# Patient Record
Sex: MALE | Race: WHITE | Employment: FULL TIME | URBAN - METROPOLITAN AREA
[De-identification: names, ages, dates, MRNs, and addresses within clinical notes are randomized per-mention and may not be internally consistent; named-entity substitution may affect disease eponyms.]

---

## 2017-01-01 ENCOUNTER — OFFICE VISIT (OUTPATIENT)
Dept: HEMATOLOGY/ONCOLOGY | Facility: HOSPITAL | Age: 67
End: 2017-01-01
Attending: NURSE PRACTITIONER
Payer: MEDICARE

## 2017-01-01 ENCOUNTER — TELEPHONE (OUTPATIENT)
Dept: HEMATOLOGY/ONCOLOGY | Facility: HOSPITAL | Age: 67
End: 2017-01-01

## 2017-01-01 ENCOUNTER — APPOINTMENT (OUTPATIENT)
Dept: HEMATOLOGY/ONCOLOGY | Facility: HOSPITAL | Age: 67
End: 2017-01-01
Attending: INTERNAL MEDICINE
Payer: COMMERCIAL

## 2017-01-01 ENCOUNTER — HOSPITAL ENCOUNTER (OUTPATIENT)
Dept: RADIATION ONCOLOGY | Facility: HOSPITAL | Age: 67
Discharge: HOME | End: 2017-01-01
Attending: RADIOLOGY
Payer: MEDICARE

## 2017-01-01 ENCOUNTER — HOSPITAL ENCOUNTER (EMERGENCY)
Facility: HOSPITAL | Age: 67
Discharge: HOME | End: 2017-01-01
Attending: EMERGENCY MEDICINE
Payer: MEDICARE

## 2017-01-01 ENCOUNTER — APPOINTMENT (OUTPATIENT)
Dept: GENERAL RADIOLOGY | Facility: HOSPITAL | Age: 67
DRG: 028 | End: 2017-01-01
Attending: Other
Payer: MEDICARE

## 2017-01-01 ENCOUNTER — HOSPITAL ENCOUNTER (OUTPATIENT)
Dept: NUCLEAR MEDICINE | Facility: HOSPITAL | Age: 67
Discharge: HOME | End: 2017-01-01
Attending: INTERNAL MEDICINE
Payer: COMMERCIAL

## 2017-01-01 ENCOUNTER — HOSPITAL ENCOUNTER (INPATIENT)
Facility: HOSPITAL | Age: 67
LOS: 2 days | Discharge: HOME | DRG: 542 | End: 2017-01-01
Attending: EMERGENCY MEDICINE | Admitting: HOSPITALIST
Payer: MEDICARE

## 2017-01-01 ENCOUNTER — ANESTHESIA (OUTPATIENT)
Dept: ENDOSCOPY | Facility: HOSPITAL | Age: 67
End: 2017-01-01

## 2017-01-01 ENCOUNTER — APPOINTMENT (OUTPATIENT)
Dept: CT IMAGING | Facility: HOSPITAL | Age: 67
DRG: 871 | End: 2017-01-01
Attending: Other
Payer: MEDICARE

## 2017-01-01 ENCOUNTER — HOSPITAL ENCOUNTER (OUTPATIENT)
Facility: HOSPITAL | Age: 67
Setting detail: OBSERVATION
Discharge: HOME | End: 2017-01-01
Attending: EMERGENCY MEDICINE | Admitting: INTERNAL MEDICINE
Payer: MEDICARE

## 2017-01-01 ENCOUNTER — APPOINTMENT (OUTPATIENT)
Dept: GENERAL RADIOLOGY | Facility: HOSPITAL | Age: 67
DRG: 028 | End: 2017-01-01
Attending: NEUROLOGICAL SURGERY
Payer: MEDICARE

## 2017-01-01 ENCOUNTER — TELEPHONE (OUTPATIENT)
Dept: FAMILY MEDICINE CLINIC | Facility: CLINIC | Age: 67
End: 2017-01-01

## 2017-01-01 ENCOUNTER — APPOINTMENT (OUTPATIENT)
Dept: CT IMAGING | Facility: HOSPITAL | Age: 67
End: 2017-01-01
Attending: EMERGENCY MEDICINE
Payer: MEDICARE

## 2017-01-01 ENCOUNTER — ANESTHESIA EVENT (OUTPATIENT)
Dept: SURGERY | Facility: HOSPITAL | Age: 67
End: 2017-01-01

## 2017-01-01 ENCOUNTER — HOSPITAL ENCOUNTER (INPATIENT)
Dept: GENERAL RADIOLOGY | Facility: HOSPITAL | Age: 67
LOS: 8 days | Discharge: HOME HEALTH | DRG: 871 | End: 2017-01-01
Attending: HOSPITALIST | Admitting: HOSPITALIST
Payer: MEDICARE

## 2017-01-01 ENCOUNTER — OFFICE VISIT (OUTPATIENT)
Dept: HEMATOLOGY/ONCOLOGY | Facility: HOSPITAL | Age: 67
End: 2017-01-01
Attending: INTERNAL MEDICINE
Payer: MEDICARE

## 2017-01-01 ENCOUNTER — HOSPITAL ENCOUNTER (OUTPATIENT)
Dept: RADIATION ONCOLOGY | Facility: HOSPITAL | Age: 67
End: 2017-01-01
Attending: RADIOLOGY
Payer: MEDICARE

## 2017-01-01 ENCOUNTER — DOCUMENTATION ONLY (OUTPATIENT)
Dept: RADIATION ONCOLOGY | Facility: HOSPITAL | Age: 67
End: 2017-01-01

## 2017-01-01 ENCOUNTER — APPOINTMENT (OUTPATIENT)
Dept: GENERAL RADIOLOGY | Facility: HOSPITAL | Age: 67
DRG: 028 | End: 2017-01-01
Attending: SPECIALIST
Payer: MEDICARE

## 2017-01-01 ENCOUNTER — APPOINTMENT (OUTPATIENT)
Dept: ULTRASOUND IMAGING | Facility: HOSPITAL | Age: 67
DRG: 871 | End: 2017-01-01
Attending: INTERNAL MEDICINE
Payer: MEDICARE

## 2017-01-01 ENCOUNTER — APPOINTMENT (OUTPATIENT)
Dept: MRI IMAGING | Facility: HOSPITAL | Age: 67
DRG: 028 | End: 2017-01-01
Attending: Other
Payer: MEDICARE

## 2017-01-01 ENCOUNTER — APPOINTMENT (OUTPATIENT)
Dept: CT IMAGING | Facility: HOSPITAL | Age: 67
DRG: 028 | End: 2017-01-01
Attending: NURSE PRACTITIONER
Payer: MEDICARE

## 2017-01-01 ENCOUNTER — SOCIAL WORK SERVICES (OUTPATIENT)
Dept: HEMATOLOGY/ONCOLOGY | Facility: HOSPITAL | Age: 67
End: 2017-01-01

## 2017-01-01 ENCOUNTER — HOSPITAL ENCOUNTER (INPATIENT)
Dept: RADIATION ONCOLOGY | Facility: HOSPITAL | Age: 67
Discharge: HOME | DRG: 542 | End: 2017-01-01
Attending: RADIOLOGY
Payer: MEDICARE

## 2017-01-01 ENCOUNTER — SURGERY (OUTPATIENT)
Age: 67
End: 2017-01-01

## 2017-01-01 ENCOUNTER — HOSPITAL ENCOUNTER (OUTPATIENT)
Dept: INTERVENTIONAL RADIOLOGY/VASCULAR | Facility: HOSPITAL | Age: 67
Discharge: HOME | End: 2017-01-01
Attending: INTERNAL MEDICINE | Admitting: INTERNAL MEDICINE
Payer: COMMERCIAL

## 2017-01-01 ENCOUNTER — APPOINTMENT (OUTPATIENT)
Dept: ULTRASOUND IMAGING | Facility: HOSPITAL | Age: 67
End: 2017-01-01
Attending: EMERGENCY MEDICINE
Payer: MEDICARE

## 2017-01-01 ENCOUNTER — HOSPITAL ENCOUNTER (OUTPATIENT)
Dept: MRI IMAGING | Facility: HOSPITAL | Age: 67
Discharge: HOME | End: 2017-01-01
Attending: INTERNAL MEDICINE
Payer: COMMERCIAL

## 2017-01-01 ENCOUNTER — HOSPITAL ENCOUNTER (INPATIENT)
Facility: HOSPITAL | Age: 67
LOS: 8 days | Discharge: HOSPICE | DRG: 028 | End: 2017-01-01
Attending: EMERGENCY MEDICINE | Admitting: HOSPITALIST
Payer: MEDICARE

## 2017-01-01 ENCOUNTER — APPOINTMENT (OUTPATIENT)
Dept: LAB | Facility: HOSPITAL | Age: 67
End: 2017-01-01
Attending: INTERNAL MEDICINE
Payer: COMMERCIAL

## 2017-01-01 ENCOUNTER — APPOINTMENT (OUTPATIENT)
Dept: GENERAL RADIOLOGY | Facility: HOSPITAL | Age: 67
DRG: 871 | End: 2017-01-01
Attending: HOSPITALIST
Payer: MEDICARE

## 2017-01-01 ENCOUNTER — TELEPHONE (OUTPATIENT)
Dept: SURGERY | Facility: CLINIC | Age: 67
End: 2017-01-01

## 2017-01-01 ENCOUNTER — LAB REQUISITION (OUTPATIENT)
Dept: LAB | Facility: HOSPITAL | Age: 67
DRG: 682 | End: 2017-01-01
Attending: FAMILY MEDICINE
Payer: MEDICARE

## 2017-01-01 ENCOUNTER — APPOINTMENT (OUTPATIENT)
Dept: CT IMAGING | Facility: HOSPITAL | Age: 67
DRG: 392 | End: 2017-01-01
Attending: EMERGENCY MEDICINE
Payer: MEDICARE

## 2017-01-01 ENCOUNTER — HOSPITAL ENCOUNTER (INPATIENT)
Facility: HOSPITAL | Age: 67
LOS: 1 days | Discharge: HOME | DRG: 683 | End: 2017-01-01
Attending: EMERGENCY MEDICINE | Admitting: INTERNAL MEDICINE
Payer: MEDICARE

## 2017-01-01 ENCOUNTER — APPOINTMENT (OUTPATIENT)
Dept: GENERAL RADIOLOGY | Facility: HOSPITAL | Age: 67
DRG: 542 | End: 2017-01-01
Attending: EMERGENCY MEDICINE
Payer: MEDICARE

## 2017-01-01 ENCOUNTER — OFFICE VISIT (OUTPATIENT)
Dept: FAMILY MEDICINE CLINIC | Facility: CLINIC | Age: 67
End: 2017-01-01

## 2017-01-01 ENCOUNTER — ANESTHESIA EVENT (OUTPATIENT)
Dept: MRI IMAGING | Facility: HOSPITAL | Age: 67
End: 2017-01-01

## 2017-01-01 ENCOUNTER — HOSPITAL ENCOUNTER (OUTPATIENT)
Dept: NUCLEAR MEDICINE | Facility: HOSPITAL | Age: 67
Discharge: HOME | End: 2017-01-01
Attending: INTERNAL MEDICINE
Payer: MEDICARE

## 2017-01-01 ENCOUNTER — HOSPITAL ENCOUNTER (OUTPATIENT)
Dept: NUCLEAR MEDICINE | Facility: HOSPITAL | Age: 67
Discharge: HOME | DRG: 543 | End: 2017-01-01
Attending: INTERNAL MEDICINE
Payer: MEDICARE

## 2017-01-01 ENCOUNTER — APPOINTMENT (OUTPATIENT)
Dept: GENERAL RADIOLOGY | Facility: HOSPITAL | Age: 67
DRG: 543 | End: 2017-01-01
Attending: EMERGENCY MEDICINE
Payer: MEDICARE

## 2017-01-01 ENCOUNTER — APPOINTMENT (OUTPATIENT)
Dept: CT IMAGING | Facility: HOSPITAL | Age: 67
DRG: 543 | End: 2017-01-01
Attending: EMERGENCY MEDICINE
Payer: MEDICARE

## 2017-01-01 ENCOUNTER — APPOINTMENT (OUTPATIENT)
Dept: GENERAL RADIOLOGY | Facility: HOSPITAL | Age: 67
DRG: 028 | End: 2017-01-01
Attending: EMERGENCY MEDICINE
Payer: MEDICARE

## 2017-01-01 ENCOUNTER — APPOINTMENT (OUTPATIENT)
Dept: GENERAL RADIOLOGY | Facility: HOSPITAL | Age: 67
End: 2017-01-01
Attending: EMERGENCY MEDICINE
Payer: MEDICARE

## 2017-01-01 ENCOUNTER — MEDICAL CORRESPONDENCE (OUTPATIENT)
Dept: RADIATION ONCOLOGY | Facility: HOSPITAL | Age: 67
End: 2017-01-01

## 2017-01-01 ENCOUNTER — HOSPITAL ENCOUNTER (OUTPATIENT)
Dept: CV DIAGNOSTICS | Facility: HOSPITAL | Age: 67
Discharge: HOME | DRG: 682 | End: 2017-01-01
Attending: INTERNAL MEDICINE
Payer: MEDICARE

## 2017-01-01 ENCOUNTER — APPOINTMENT (OUTPATIENT)
Dept: MRI IMAGING | Facility: HOSPITAL | Age: 67
DRG: 543 | End: 2017-01-01
Attending: PHYSICIAN ASSISTANT
Payer: MEDICARE

## 2017-01-01 ENCOUNTER — APPOINTMENT (OUTPATIENT)
Dept: CV DIAGNOSTICS | Facility: HOSPITAL | Age: 67
DRG: 543 | End: 2017-01-01
Attending: INTERNAL MEDICINE
Payer: MEDICARE

## 2017-01-01 ENCOUNTER — HOSPITAL ENCOUNTER (OUTPATIENT)
Dept: CT IMAGING | Facility: HOSPITAL | Age: 67
Discharge: HOME | End: 2017-01-01
Payer: MEDICARE

## 2017-01-01 ENCOUNTER — TELEPHONE (OUTPATIENT)
Dept: RADIATION ONCOLOGY | Facility: HOSPITAL | Age: 67
End: 2017-01-01

## 2017-01-01 ENCOUNTER — EKG ENCOUNTER (OUTPATIENT)
Dept: LAB | Facility: HOSPITAL | Age: 67
End: 2017-01-01
Attending: INTERNAL MEDICINE
Payer: MEDICARE

## 2017-01-01 ENCOUNTER — HOSPITAL ENCOUNTER (OUTPATIENT)
Dept: GENERAL RADIOLOGY | Facility: HOSPITAL | Age: 67
Discharge: HOME | End: 2017-01-01
Attending: INTERNAL MEDICINE
Payer: MEDICARE

## 2017-01-01 ENCOUNTER — HOSPITAL ENCOUNTER (OUTPATIENT)
Facility: HOSPITAL | Age: 67
Setting detail: OBSERVATION
Discharge: HOME | End: 2017-01-01
Attending: EMERGENCY MEDICINE | Admitting: HOSPITALIST
Payer: MEDICARE

## 2017-01-01 ENCOUNTER — ANESTHESIA EVENT (OUTPATIENT)
Dept: ENDOSCOPY | Facility: HOSPITAL | Age: 67
End: 2017-01-01

## 2017-01-01 ENCOUNTER — APPOINTMENT (OUTPATIENT)
Dept: GENERAL RADIOLOGY | Facility: HOSPITAL | Age: 67
DRG: 028 | End: 2017-01-01
Attending: INTERNAL MEDICINE
Payer: MEDICARE

## 2017-01-01 ENCOUNTER — HOSPITAL ENCOUNTER (INPATIENT)
Facility: HOSPITAL | Age: 67
LOS: 3 days | Discharge: HOME | DRG: 439 | End: 2017-01-01
Attending: EMERGENCY MEDICINE | Admitting: INTERNAL MEDICINE
Payer: COMMERCIAL

## 2017-01-01 ENCOUNTER — APPOINTMENT (OUTPATIENT)
Dept: GENERAL RADIOLOGY | Facility: HOSPITAL | Age: 67
DRG: 028 | End: 2017-01-01
Attending: NURSE PRACTITIONER
Payer: MEDICARE

## 2017-01-01 ENCOUNTER — ANESTHESIA (OUTPATIENT)
Dept: MRI IMAGING | Facility: HOSPITAL | Age: 67
End: 2017-01-01

## 2017-01-01 ENCOUNTER — HOSPITAL ENCOUNTER (INPATIENT)
Facility: HOSPITAL | Age: 67
LOS: 1 days | Discharge: HOME | DRG: 543 | End: 2017-01-01
Attending: EMERGENCY MEDICINE | Admitting: HOSPITALIST
Payer: MEDICARE

## 2017-01-01 ENCOUNTER — ANESTHESIA (OUTPATIENT)
Dept: SURGERY | Facility: HOSPITAL | Age: 67
End: 2017-01-01

## 2017-01-01 ENCOUNTER — HOSPITAL ENCOUNTER (OUTPATIENT)
Dept: NUCLEAR MEDICINE | Facility: HOSPITAL | Age: 67
End: 2017-01-01
Attending: INTERNAL MEDICINE
Payer: COMMERCIAL

## 2017-01-01 ENCOUNTER — HOSPITAL ENCOUNTER (INPATIENT)
Facility: HOSPITAL | Age: 67
LOS: 1 days | Discharge: HOME | DRG: 392 | End: 2017-01-01
Attending: EMERGENCY MEDICINE | Admitting: HOSPITALIST
Payer: MEDICARE

## 2017-01-01 ENCOUNTER — HOSPITAL ENCOUNTER (OUTPATIENT)
Dept: GENERAL RADIOLOGY | Facility: HOSPITAL | Age: 67
Discharge: HOME | End: 2017-01-01
Attending: RADIOLOGY
Payer: MEDICARE

## 2017-01-01 ENCOUNTER — APPOINTMENT (OUTPATIENT)
Dept: CT IMAGING | Facility: HOSPITAL | Age: 67
DRG: 028 | End: 2017-01-01
Attending: Other
Payer: MEDICARE

## 2017-01-01 ENCOUNTER — APPOINTMENT (OUTPATIENT)
Dept: CT IMAGING | Facility: HOSPITAL | Age: 67
DRG: 028 | End: 2017-01-01
Attending: EMERGENCY MEDICINE
Payer: MEDICARE

## 2017-01-01 VITALS
RESPIRATION RATE: 20 BRPM | DIASTOLIC BLOOD PRESSURE: 78 MMHG | HEIGHT: 67.48 IN | BODY MASS INDEX: 23.7 KG/M2 | HEART RATE: 71 BPM | WEIGHT: 152.81 LBS | TEMPERATURE: 98 F | SYSTOLIC BLOOD PRESSURE: 139 MMHG | OXYGEN SATURATION: 98 %

## 2017-01-01 VITALS
HEART RATE: 63 BPM | SYSTOLIC BLOOD PRESSURE: 116 MMHG | RESPIRATION RATE: 20 BRPM | TEMPERATURE: 98 F | OXYGEN SATURATION: 99 % | BODY MASS INDEX: 24 KG/M2 | DIASTOLIC BLOOD PRESSURE: 57 MMHG | WEIGHT: 156.5 LBS

## 2017-01-01 VITALS
BODY MASS INDEX: 22.5 KG/M2 | OXYGEN SATURATION: 98 % | SYSTOLIC BLOOD PRESSURE: 132 MMHG | DIASTOLIC BLOOD PRESSURE: 67 MMHG | HEART RATE: 70 BPM | HEIGHT: 66.14 IN | WEIGHT: 140 LBS | RESPIRATION RATE: 18 BRPM | TEMPERATURE: 98 F

## 2017-01-01 VITALS
RESPIRATION RATE: 18 BRPM | HEART RATE: 90 BPM | DIASTOLIC BLOOD PRESSURE: 61 MMHG | TEMPERATURE: 97 F | SYSTOLIC BLOOD PRESSURE: 106 MMHG

## 2017-01-01 VITALS
DIASTOLIC BLOOD PRESSURE: 81 MMHG | HEIGHT: 66.14 IN | HEIGHT: 66.14 IN | OXYGEN SATURATION: 93 % | BODY MASS INDEX: 22.02 KG/M2 | WEIGHT: 137 LBS | WEIGHT: 151.81 LBS | HEART RATE: 76 BPM | SYSTOLIC BLOOD PRESSURE: 116 MMHG | TEMPERATURE: 97 F | HEART RATE: 92 BPM | DIASTOLIC BLOOD PRESSURE: 52 MMHG | RESPIRATION RATE: 20 BRPM | BODY MASS INDEX: 24.4 KG/M2 | SYSTOLIC BLOOD PRESSURE: 111 MMHG | TEMPERATURE: 97 F

## 2017-01-01 VITALS
RESPIRATION RATE: 18 BRPM | DIASTOLIC BLOOD PRESSURE: 61 MMHG | HEIGHT: 66.14 IN | SYSTOLIC BLOOD PRESSURE: 120 MMHG | BODY MASS INDEX: 22.31 KG/M2 | HEART RATE: 84 BPM | TEMPERATURE: 97 F | WEIGHT: 138.81 LBS

## 2017-01-01 VITALS
WEIGHT: 146 LBS | BODY MASS INDEX: 24 KG/M2 | RESPIRATION RATE: 18 BRPM | SYSTOLIC BLOOD PRESSURE: 140 MMHG | TEMPERATURE: 97 F | HEART RATE: 71 BPM | DIASTOLIC BLOOD PRESSURE: 80 MMHG

## 2017-01-01 VITALS
SYSTOLIC BLOOD PRESSURE: 138 MMHG | HEIGHT: 66.14 IN | RESPIRATION RATE: 18 BRPM | BODY MASS INDEX: 24.46 KG/M2 | TEMPERATURE: 97 F | HEART RATE: 80 BPM | DIASTOLIC BLOOD PRESSURE: 72 MMHG | OXYGEN SATURATION: 99 % | WEIGHT: 152.19 LBS

## 2017-01-01 VITALS
WEIGHT: 121.25 LBS | DIASTOLIC BLOOD PRESSURE: 74 MMHG | RESPIRATION RATE: 17 BRPM | TEMPERATURE: 99 F | HEIGHT: 66 IN | SYSTOLIC BLOOD PRESSURE: 146 MMHG | OXYGEN SATURATION: 98 % | HEART RATE: 97 BPM | BODY MASS INDEX: 19.49 KG/M2

## 2017-01-01 VITALS
DIASTOLIC BLOOD PRESSURE: 62 MMHG | TEMPERATURE: 98 F | HEIGHT: 66 IN | BODY MASS INDEX: 20.25 KG/M2 | WEIGHT: 126 LBS | SYSTOLIC BLOOD PRESSURE: 110 MMHG | RESPIRATION RATE: 16 BRPM | HEART RATE: 72 BPM

## 2017-01-01 VITALS
SYSTOLIC BLOOD PRESSURE: 151 MMHG | BODY MASS INDEX: 23.48 KG/M2 | HEIGHT: 67.48 IN | WEIGHT: 151.38 LBS | TEMPERATURE: 97 F | OXYGEN SATURATION: 98 % | HEART RATE: 83 BPM | DIASTOLIC BLOOD PRESSURE: 83 MMHG | RESPIRATION RATE: 18 BRPM

## 2017-01-01 VITALS
HEIGHT: 66 IN | SYSTOLIC BLOOD PRESSURE: 130 MMHG | BODY MASS INDEX: 19.93 KG/M2 | RESPIRATION RATE: 16 BRPM | WEIGHT: 124 LBS | HEART RATE: 74 BPM | OXYGEN SATURATION: 98 % | DIASTOLIC BLOOD PRESSURE: 78 MMHG | TEMPERATURE: 98 F

## 2017-01-01 VITALS
RESPIRATION RATE: 20 BRPM | TEMPERATURE: 98 F | TEMPERATURE: 96 F | HEIGHT: 66.14 IN | WEIGHT: 152 LBS | BODY MASS INDEX: 24.56 KG/M2 | WEIGHT: 152.81 LBS | RESPIRATION RATE: 18 BRPM | DIASTOLIC BLOOD PRESSURE: 78 MMHG | SYSTOLIC BLOOD PRESSURE: 147 MMHG | DIASTOLIC BLOOD PRESSURE: 77 MMHG | BODY MASS INDEX: 24 KG/M2 | OXYGEN SATURATION: 98 % | HEART RATE: 72 BPM | HEART RATE: 80 BPM | SYSTOLIC BLOOD PRESSURE: 143 MMHG

## 2017-01-01 VITALS
HEIGHT: 66 IN | BODY MASS INDEX: 22.76 KG/M2 | RESPIRATION RATE: 18 BRPM | HEART RATE: 68 BPM | TEMPERATURE: 98 F | OXYGEN SATURATION: 96 % | DIASTOLIC BLOOD PRESSURE: 57 MMHG | SYSTOLIC BLOOD PRESSURE: 124 MMHG | WEIGHT: 141.63 LBS

## 2017-01-01 VITALS
BODY MASS INDEX: 24.28 KG/M2 | DIASTOLIC BLOOD PRESSURE: 61 MMHG | OXYGEN SATURATION: 99 % | SYSTOLIC BLOOD PRESSURE: 169 MMHG | TEMPERATURE: 97 F | RESPIRATION RATE: 20 BRPM | WEIGHT: 156.5 LBS | HEIGHT: 67.48 IN

## 2017-01-01 VITALS
HEIGHT: 66.14 IN | BODY MASS INDEX: 22.5 KG/M2 | WEIGHT: 140 LBS | OXYGEN SATURATION: 99 % | TEMPERATURE: 99 F | RESPIRATION RATE: 18 BRPM | DIASTOLIC BLOOD PRESSURE: 74 MMHG | SYSTOLIC BLOOD PRESSURE: 127 MMHG | HEART RATE: 88 BPM

## 2017-01-01 VITALS
RESPIRATION RATE: 18 BRPM | OXYGEN SATURATION: 97 % | TEMPERATURE: 98 F | SYSTOLIC BLOOD PRESSURE: 130 MMHG | DIASTOLIC BLOOD PRESSURE: 66 MMHG | HEIGHT: 67.48 IN | HEART RATE: 88 BPM | BODY MASS INDEX: 23.14 KG/M2 | WEIGHT: 149.19 LBS

## 2017-01-01 VITALS
OXYGEN SATURATION: 95 % | HEART RATE: 80 BPM | TEMPERATURE: 97 F | RESPIRATION RATE: 18 BRPM | DIASTOLIC BLOOD PRESSURE: 77 MMHG | HEIGHT: 66.14 IN | SYSTOLIC BLOOD PRESSURE: 151 MMHG | WEIGHT: 137.38 LBS | BODY MASS INDEX: 22.08 KG/M2

## 2017-01-01 VITALS
DIASTOLIC BLOOD PRESSURE: 80 MMHG | TEMPERATURE: 98 F | WEIGHT: 137.19 LBS | SYSTOLIC BLOOD PRESSURE: 125 MMHG | BODY MASS INDEX: 22.05 KG/M2 | HEART RATE: 107 BPM | RESPIRATION RATE: 18 BRPM | HEIGHT: 66.14 IN | OXYGEN SATURATION: 96 %

## 2017-01-01 VITALS
DIASTOLIC BLOOD PRESSURE: 78 MMHG | SYSTOLIC BLOOD PRESSURE: 131 MMHG | RESPIRATION RATE: 16 BRPM | HEART RATE: 79 BPM | TEMPERATURE: 98 F

## 2017-01-01 VITALS
WEIGHT: 150.81 LBS | SYSTOLIC BLOOD PRESSURE: 137 MMHG | TEMPERATURE: 97 F | RESPIRATION RATE: 20 BRPM | DIASTOLIC BLOOD PRESSURE: 69 MMHG | OXYGEN SATURATION: 99 % | HEART RATE: 80 BPM | BODY MASS INDEX: 24.24 KG/M2 | HEIGHT: 66.14 IN

## 2017-01-01 VITALS
BODY MASS INDEX: 23.61 KG/M2 | HEIGHT: 67.48 IN | RESPIRATION RATE: 16 BRPM | DIASTOLIC BLOOD PRESSURE: 60 MMHG | HEART RATE: 80 BPM | WEIGHT: 152.19 LBS | OXYGEN SATURATION: 96 % | TEMPERATURE: 98 F | SYSTOLIC BLOOD PRESSURE: 115 MMHG

## 2017-01-01 VITALS
DIASTOLIC BLOOD PRESSURE: 73 MMHG | SYSTOLIC BLOOD PRESSURE: 131 MMHG | OXYGEN SATURATION: 94 % | WEIGHT: 155.63 LBS | HEIGHT: 66.14 IN | RESPIRATION RATE: 18 BRPM | HEART RATE: 79 BPM | BODY MASS INDEX: 25.01 KG/M2 | TEMPERATURE: 98 F

## 2017-01-01 VITALS
SYSTOLIC BLOOD PRESSURE: 156 MMHG | HEART RATE: 69 BPM | WEIGHT: 153.81 LBS | TEMPERATURE: 97 F | BODY MASS INDEX: 23.86 KG/M2 | DIASTOLIC BLOOD PRESSURE: 89 MMHG | OXYGEN SATURATION: 98 % | HEIGHT: 67.48 IN | RESPIRATION RATE: 18 BRPM

## 2017-01-01 VITALS
BODY MASS INDEX: 23.86 KG/M2 | HEIGHT: 67 IN | RESPIRATION RATE: 14 BRPM | DIASTOLIC BLOOD PRESSURE: 57 MMHG | SYSTOLIC BLOOD PRESSURE: 114 MMHG | WEIGHT: 152 LBS | HEART RATE: 71 BPM | OXYGEN SATURATION: 94 % | TEMPERATURE: 97 F

## 2017-01-01 VITALS
RESPIRATION RATE: 15 BRPM | HEIGHT: 66 IN | BODY MASS INDEX: 24.43 KG/M2 | HEART RATE: 61 BPM | TEMPERATURE: 97 F | OXYGEN SATURATION: 98 % | WEIGHT: 152 LBS | DIASTOLIC BLOOD PRESSURE: 81 MMHG | SYSTOLIC BLOOD PRESSURE: 139 MMHG

## 2017-01-01 VITALS
OXYGEN SATURATION: 95 % | RESPIRATION RATE: 18 BRPM | HEART RATE: 75 BPM | DIASTOLIC BLOOD PRESSURE: 68 MMHG | WEIGHT: 147.69 LBS | TEMPERATURE: 98 F | BODY MASS INDEX: 24 KG/M2 | SYSTOLIC BLOOD PRESSURE: 148 MMHG

## 2017-01-01 VITALS
WEIGHT: 138.19 LBS | TEMPERATURE: 98 F | HEART RATE: 90 BPM | SYSTOLIC BLOOD PRESSURE: 113 MMHG | RESPIRATION RATE: 18 BRPM | OXYGEN SATURATION: 100 % | DIASTOLIC BLOOD PRESSURE: 71 MMHG | BODY MASS INDEX: 22.21 KG/M2 | HEIGHT: 66.14 IN

## 2017-01-01 VITALS
WEIGHT: 151.63 LBS | OXYGEN SATURATION: 96 % | SYSTOLIC BLOOD PRESSURE: 123 MMHG | RESPIRATION RATE: 18 BRPM | TEMPERATURE: 98 F | DIASTOLIC BLOOD PRESSURE: 70 MMHG | BODY MASS INDEX: 24 KG/M2 | HEART RATE: 93 BPM

## 2017-01-01 VITALS
TEMPERATURE: 98 F | OXYGEN SATURATION: 96 % | WEIGHT: 141.5 LBS | HEART RATE: 73 BPM | RESPIRATION RATE: 18 BRPM | HEIGHT: 66.14 IN | DIASTOLIC BLOOD PRESSURE: 78 MMHG | SYSTOLIC BLOOD PRESSURE: 148 MMHG | BODY MASS INDEX: 22.74 KG/M2

## 2017-01-01 VITALS
SYSTOLIC BLOOD PRESSURE: 141 MMHG | HEART RATE: 90 BPM | BODY MASS INDEX: 22 KG/M2 | WEIGHT: 137 LBS | DIASTOLIC BLOOD PRESSURE: 69 MMHG

## 2017-01-01 VITALS
OXYGEN SATURATION: 98 % | HEART RATE: 81 BPM | TEMPERATURE: 98 F | WEIGHT: 138.81 LBS | SYSTOLIC BLOOD PRESSURE: 149 MMHG | DIASTOLIC BLOOD PRESSURE: 83 MMHG | HEIGHT: 66.14 IN | RESPIRATION RATE: 18 BRPM | BODY MASS INDEX: 22.31 KG/M2

## 2017-01-01 VITALS
HEART RATE: 93 BPM | TEMPERATURE: 97 F | OXYGEN SATURATION: 96 % | RESPIRATION RATE: 18 BRPM | DIASTOLIC BLOOD PRESSURE: 90 MMHG | SYSTOLIC BLOOD PRESSURE: 162 MMHG

## 2017-01-01 VITALS
TEMPERATURE: 98 F | WEIGHT: 142 LBS | DIASTOLIC BLOOD PRESSURE: 68 MMHG | BODY MASS INDEX: 22.82 KG/M2 | HEART RATE: 73 BPM | OXYGEN SATURATION: 99 % | HEIGHT: 66.14 IN | RESPIRATION RATE: 18 BRPM | SYSTOLIC BLOOD PRESSURE: 126 MMHG

## 2017-01-01 VITALS
WEIGHT: 150.38 LBS | DIASTOLIC BLOOD PRESSURE: 68 MMHG | BODY MASS INDEX: 24.17 KG/M2 | RESPIRATION RATE: 18 BRPM | HEART RATE: 79 BPM | OXYGEN SATURATION: 95 % | TEMPERATURE: 97 F | SYSTOLIC BLOOD PRESSURE: 139 MMHG | HEIGHT: 66.14 IN

## 2017-01-01 VITALS
HEIGHT: 66.14 IN | WEIGHT: 152.19 LBS | OXYGEN SATURATION: 99 % | BODY MASS INDEX: 24.46 KG/M2 | SYSTOLIC BLOOD PRESSURE: 115 MMHG | HEART RATE: 101 BPM | TEMPERATURE: 98 F | RESPIRATION RATE: 18 BRPM | DIASTOLIC BLOOD PRESSURE: 72 MMHG

## 2017-01-01 VITALS
BODY MASS INDEX: 22.5 KG/M2 | DIASTOLIC BLOOD PRESSURE: 78 MMHG | TEMPERATURE: 98 F | HEART RATE: 80 BPM | SYSTOLIC BLOOD PRESSURE: 154 MMHG | OXYGEN SATURATION: 98 % | WEIGHT: 140 LBS | HEIGHT: 66 IN | RESPIRATION RATE: 18 BRPM

## 2017-01-01 VITALS
BODY MASS INDEX: 22.5 KG/M2 | SYSTOLIC BLOOD PRESSURE: 125 MMHG | DIASTOLIC BLOOD PRESSURE: 72 MMHG | HEIGHT: 66.14 IN | WEIGHT: 140 LBS | HEART RATE: 89 BPM | RESPIRATION RATE: 18 BRPM | TEMPERATURE: 97 F | OXYGEN SATURATION: 98 %

## 2017-01-01 VITALS
RESPIRATION RATE: 18 BRPM | OXYGEN SATURATION: 95 % | SYSTOLIC BLOOD PRESSURE: 146 MMHG | BODY MASS INDEX: 24.11 KG/M2 | TEMPERATURE: 98 F | HEIGHT: 66 IN | WEIGHT: 150 LBS | DIASTOLIC BLOOD PRESSURE: 74 MMHG | HEART RATE: 65 BPM

## 2017-01-01 VITALS
WEIGHT: 144 LBS | BODY MASS INDEX: 23.14 KG/M2 | HEART RATE: 73 BPM | DIASTOLIC BLOOD PRESSURE: 83 MMHG | HEIGHT: 66.14 IN | TEMPERATURE: 97 F | RESPIRATION RATE: 18 BRPM | SYSTOLIC BLOOD PRESSURE: 162 MMHG | OXYGEN SATURATION: 98 %

## 2017-01-01 VITALS
RESPIRATION RATE: 18 BRPM | HEART RATE: 94 BPM | SYSTOLIC BLOOD PRESSURE: 114 MMHG | BODY MASS INDEX: 22.44 KG/M2 | DIASTOLIC BLOOD PRESSURE: 46 MMHG | HEIGHT: 66.14 IN | WEIGHT: 139.63 LBS | OXYGEN SATURATION: 95 % | TEMPERATURE: 97 F

## 2017-01-01 VITALS
HEART RATE: 96 BPM | TEMPERATURE: 99 F | SYSTOLIC BLOOD PRESSURE: 156 MMHG | WEIGHT: 142 LBS | OXYGEN SATURATION: 100 % | BODY MASS INDEX: 23 KG/M2 | DIASTOLIC BLOOD PRESSURE: 84 MMHG | RESPIRATION RATE: 22 BRPM

## 2017-01-01 VITALS
HEART RATE: 79 BPM | BODY MASS INDEX: 20 KG/M2 | WEIGHT: 127.38 LBS | RESPIRATION RATE: 17 BRPM | DIASTOLIC BLOOD PRESSURE: 81 MMHG | OXYGEN SATURATION: 98 % | SYSTOLIC BLOOD PRESSURE: 145 MMHG

## 2017-01-01 VITALS
RESPIRATION RATE: 20 BRPM | OXYGEN SATURATION: 98 % | HEART RATE: 87 BPM | WEIGHT: 152.19 LBS | SYSTOLIC BLOOD PRESSURE: 169 MMHG | DIASTOLIC BLOOD PRESSURE: 87 MMHG | BODY MASS INDEX: 24.46 KG/M2 | HEIGHT: 66.14 IN | TEMPERATURE: 97 F

## 2017-01-01 VITALS
RESPIRATION RATE: 18 BRPM | HEART RATE: 52 BPM | DIASTOLIC BLOOD PRESSURE: 63 MMHG | SYSTOLIC BLOOD PRESSURE: 118 MMHG | BODY MASS INDEX: 22.42 KG/M2 | WEIGHT: 139.5 LBS | OXYGEN SATURATION: 95 % | HEIGHT: 66 IN | TEMPERATURE: 98 F

## 2017-01-01 VITALS
HEART RATE: 75 BPM | RESPIRATION RATE: 18 BRPM | HEIGHT: 67.48 IN | OXYGEN SATURATION: 98 % | WEIGHT: 153 LBS | TEMPERATURE: 97 F | DIASTOLIC BLOOD PRESSURE: 76 MMHG | SYSTOLIC BLOOD PRESSURE: 122 MMHG | BODY MASS INDEX: 23.73 KG/M2

## 2017-01-01 VITALS
OXYGEN SATURATION: 92 % | WEIGHT: 124.38 LBS | TEMPERATURE: 98 F | DIASTOLIC BLOOD PRESSURE: 73 MMHG | WEIGHT: 123 LBS | SYSTOLIC BLOOD PRESSURE: 123 MMHG | OXYGEN SATURATION: 99 % | HEART RATE: 85 BPM | DIASTOLIC BLOOD PRESSURE: 80 MMHG | HEIGHT: 66.14 IN | SYSTOLIC BLOOD PRESSURE: 121 MMHG | BODY MASS INDEX: 20 KG/M2 | BODY MASS INDEX: 19.99 KG/M2 | RESPIRATION RATE: 16 BRPM | HEART RATE: 88 BPM

## 2017-01-01 VITALS
SYSTOLIC BLOOD PRESSURE: 154 MMHG | RESPIRATION RATE: 18 BRPM | HEIGHT: 67.48 IN | TEMPERATURE: 98 F | DIASTOLIC BLOOD PRESSURE: 77 MMHG | HEART RATE: 95 BPM | BODY MASS INDEX: 24.23 KG/M2 | WEIGHT: 156.19 LBS | OXYGEN SATURATION: 96 %

## 2017-01-01 VITALS
SYSTOLIC BLOOD PRESSURE: 109 MMHG | DIASTOLIC BLOOD PRESSURE: 77 MMHG | BODY MASS INDEX: 24.21 KG/M2 | RESPIRATION RATE: 18 BRPM | WEIGHT: 150.63 LBS | HEIGHT: 66.14 IN | TEMPERATURE: 96 F | HEART RATE: 88 BPM

## 2017-01-01 VITALS
RESPIRATION RATE: 18 BRPM | WEIGHT: 141.5 LBS | TEMPERATURE: 99 F | SYSTOLIC BLOOD PRESSURE: 159 MMHG | HEART RATE: 78 BPM | DIASTOLIC BLOOD PRESSURE: 74 MMHG | OXYGEN SATURATION: 92 % | BODY MASS INDEX: 23 KG/M2

## 2017-01-01 VITALS
OXYGEN SATURATION: 98 % | TEMPERATURE: 98 F | DIASTOLIC BLOOD PRESSURE: 68 MMHG | SYSTOLIC BLOOD PRESSURE: 141 MMHG | HEART RATE: 68 BPM | HEIGHT: 66 IN | WEIGHT: 138.69 LBS | BODY MASS INDEX: 22.29 KG/M2 | RESPIRATION RATE: 20 BRPM

## 2017-01-01 VITALS
DIASTOLIC BLOOD PRESSURE: 63 MMHG | HEART RATE: 86 BPM | OXYGEN SATURATION: 93 % | TEMPERATURE: 98 F | SYSTOLIC BLOOD PRESSURE: 135 MMHG | RESPIRATION RATE: 20 BRPM

## 2017-01-01 VITALS
RESPIRATION RATE: 20 BRPM | DIASTOLIC BLOOD PRESSURE: 83 MMHG | TEMPERATURE: 98 F | SYSTOLIC BLOOD PRESSURE: 149 MMHG | HEART RATE: 92 BPM

## 2017-01-01 VITALS
WEIGHT: 141.5 LBS | HEART RATE: 78 BPM | OXYGEN SATURATION: 97 % | SYSTOLIC BLOOD PRESSURE: 129 MMHG | BODY MASS INDEX: 23 KG/M2 | DIASTOLIC BLOOD PRESSURE: 73 MMHG | RESPIRATION RATE: 20 BRPM | TEMPERATURE: 96 F

## 2017-01-01 VITALS
OXYGEN SATURATION: 95 % | TEMPERATURE: 97 F | DIASTOLIC BLOOD PRESSURE: 77 MMHG | SYSTOLIC BLOOD PRESSURE: 165 MMHG | BODY MASS INDEX: 23 KG/M2 | WEIGHT: 142 LBS | RESPIRATION RATE: 18 BRPM | HEART RATE: 80 BPM

## 2017-01-01 VITALS
RESPIRATION RATE: 18 BRPM | DIASTOLIC BLOOD PRESSURE: 76 MMHG | SYSTOLIC BLOOD PRESSURE: 143 MMHG | BODY MASS INDEX: 25 KG/M2 | WEIGHT: 152.63 LBS | TEMPERATURE: 97 F | HEART RATE: 79 BPM

## 2017-01-01 VITALS
WEIGHT: 143 LBS | OXYGEN SATURATION: 97 % | DIASTOLIC BLOOD PRESSURE: 70 MMHG | HEART RATE: 76 BPM | HEIGHT: 66.14 IN | TEMPERATURE: 98 F | SYSTOLIC BLOOD PRESSURE: 145 MMHG | RESPIRATION RATE: 18 BRPM | BODY MASS INDEX: 22.98 KG/M2

## 2017-01-01 VITALS
TEMPERATURE: 97 F | HEART RATE: 87 BPM | HEIGHT: 66.14 IN | OXYGEN SATURATION: 98 % | WEIGHT: 122 LBS | RESPIRATION RATE: 16 BRPM | BODY MASS INDEX: 19.61 KG/M2 | DIASTOLIC BLOOD PRESSURE: 83 MMHG | SYSTOLIC BLOOD PRESSURE: 142 MMHG

## 2017-01-01 VITALS
HEIGHT: 66.14 IN | HEART RATE: 83 BPM | WEIGHT: 134.63 LBS | OXYGEN SATURATION: 97 % | BODY MASS INDEX: 21.64 KG/M2 | TEMPERATURE: 98 F | DIASTOLIC BLOOD PRESSURE: 78 MMHG | SYSTOLIC BLOOD PRESSURE: 153 MMHG | RESPIRATION RATE: 18 BRPM

## 2017-01-01 VITALS
HEART RATE: 92 BPM | OXYGEN SATURATION: 95 % | SYSTOLIC BLOOD PRESSURE: 118 MMHG | TEMPERATURE: 97 F | RESPIRATION RATE: 16 BRPM | DIASTOLIC BLOOD PRESSURE: 62 MMHG

## 2017-01-01 VITALS
SYSTOLIC BLOOD PRESSURE: 142 MMHG | DIASTOLIC BLOOD PRESSURE: 83 MMHG | HEART RATE: 48 BPM | RESPIRATION RATE: 20 BRPM | TEMPERATURE: 96 F

## 2017-01-01 VITALS
TEMPERATURE: 98 F | OXYGEN SATURATION: 98 % | RESPIRATION RATE: 19 BRPM | DIASTOLIC BLOOD PRESSURE: 66 MMHG | BODY MASS INDEX: 23.2 KG/M2 | HEART RATE: 74 BPM | HEIGHT: 66.14 IN | SYSTOLIC BLOOD PRESSURE: 121 MMHG | WEIGHT: 144.38 LBS

## 2017-01-01 VITALS
SYSTOLIC BLOOD PRESSURE: 137 MMHG | TEMPERATURE: 98 F | HEIGHT: 67 IN | RESPIRATION RATE: 16 BRPM | HEART RATE: 76 BPM | OXYGEN SATURATION: 97 % | DIASTOLIC BLOOD PRESSURE: 79 MMHG | WEIGHT: 125 LBS | BODY MASS INDEX: 19.62 KG/M2

## 2017-01-01 VITALS
HEART RATE: 102 BPM | OXYGEN SATURATION: 95 % | DIASTOLIC BLOOD PRESSURE: 77 MMHG | SYSTOLIC BLOOD PRESSURE: 123 MMHG | TEMPERATURE: 97 F | RESPIRATION RATE: 16 BRPM

## 2017-01-01 VITALS
SYSTOLIC BLOOD PRESSURE: 148 MMHG | TEMPERATURE: 98 F | HEART RATE: 78 BPM | RESPIRATION RATE: 18 BRPM | BODY MASS INDEX: 24.59 KG/M2 | WEIGHT: 153 LBS | DIASTOLIC BLOOD PRESSURE: 81 MMHG | HEIGHT: 66.14 IN | OXYGEN SATURATION: 95 %

## 2017-01-01 VITALS — SYSTOLIC BLOOD PRESSURE: 145 MMHG | DIASTOLIC BLOOD PRESSURE: 70 MMHG | HEART RATE: 76 BPM

## 2017-01-01 DIAGNOSIS — K59.03 DRUG-INDUCED CONSTIPATION: ICD-10-CM

## 2017-01-01 DIAGNOSIS — C90.00 MULTIPLE MYELOMA, REMISSION STATUS UNSPECIFIED (HCC): ICD-10-CM

## 2017-01-01 DIAGNOSIS — D63.0 ANEMIA IN NEOPLASTIC DISEASE: ICD-10-CM

## 2017-01-01 DIAGNOSIS — R07.81 RIB PAIN ON RIGHT SIDE: ICD-10-CM

## 2017-01-01 DIAGNOSIS — G89.3 NEOPLASM RELATED PAIN: Primary | ICD-10-CM

## 2017-01-01 DIAGNOSIS — R63.0 DECREASED APPETITE: ICD-10-CM

## 2017-01-01 DIAGNOSIS — E86.0 DEHYDRATION: Primary | ICD-10-CM

## 2017-01-01 DIAGNOSIS — C90.02 MULTIPLE MYELOMA IN RELAPSE (HCC): ICD-10-CM

## 2017-01-01 DIAGNOSIS — R10.9 ABDOMINAL PAIN OF UNKNOWN ETIOLOGY: Primary | ICD-10-CM

## 2017-01-01 DIAGNOSIS — G47.9 SLEEPING DIFFICULTY: ICD-10-CM

## 2017-01-01 DIAGNOSIS — C90.00 MULTIPLE MYELOMA NOT HAVING ACHIEVED REMISSION (HCC): ICD-10-CM

## 2017-01-01 DIAGNOSIS — G47.9 DIFFICULTY SLEEPING: ICD-10-CM

## 2017-01-01 DIAGNOSIS — C90.00 MULTIPLE MYELOMA, REMISSION STATUS UNSPECIFIED (HCC): Primary | ICD-10-CM

## 2017-01-01 DIAGNOSIS — K59.00 CONSTIPATION, UNSPECIFIED: ICD-10-CM

## 2017-01-01 DIAGNOSIS — G89.3 CANCER ASSOCIATED PAIN: ICD-10-CM

## 2017-01-01 DIAGNOSIS — R07.81 RIB PAIN ON RIGHT SIDE: Primary | ICD-10-CM

## 2017-01-01 DIAGNOSIS — G92.8 TOXIC METABOLIC ENCEPHALOPATHY: ICD-10-CM

## 2017-01-01 DIAGNOSIS — G47.01 INSOMNIA DUE TO MEDICAL CONDITION: ICD-10-CM

## 2017-01-01 DIAGNOSIS — R53.83 FATIGUE DUE TO TREATMENT: ICD-10-CM

## 2017-01-01 DIAGNOSIS — F32.89 OTHER DEPRESSION: ICD-10-CM

## 2017-01-01 DIAGNOSIS — C90.02 MULTIPLE MYELOMA IN RELAPSE (HCC): Primary | ICD-10-CM

## 2017-01-01 DIAGNOSIS — T40.2X5A CONSTIPATION DUE TO OPIOID THERAPY: ICD-10-CM

## 2017-01-01 DIAGNOSIS — K59.03 CONSTIPATION DUE TO PAIN MEDICATION: ICD-10-CM

## 2017-01-01 DIAGNOSIS — C90.00 MULTIPLE MYELOMA NOT HAVING ACHIEVED REMISSION (HCC): Primary | ICD-10-CM

## 2017-01-01 DIAGNOSIS — T45.1X5A ANEMIA ASSOCIATED WITH CHEMOTHERAPY: ICD-10-CM

## 2017-01-01 DIAGNOSIS — K85.30 DRUG-INDUCED ACUTE PANCREATITIS WITHOUT INFECTION OR NECROSIS: ICD-10-CM

## 2017-01-01 DIAGNOSIS — A41.9 SEPSIS, DUE TO UNSPECIFIED ORGANISM: ICD-10-CM

## 2017-01-01 DIAGNOSIS — F48.9 DEFERRED DIAGNOSIS ON AXIS I: ICD-10-CM

## 2017-01-01 DIAGNOSIS — R00.1 BRADYCARDIA: Primary | ICD-10-CM

## 2017-01-01 DIAGNOSIS — G89.3 NEOPLASM RELATED PAIN: ICD-10-CM

## 2017-01-01 DIAGNOSIS — R29.898 RIGHT LEG WEAKNESS: ICD-10-CM

## 2017-01-01 DIAGNOSIS — T45.1X5A ANEMIA DUE TO CHEMOTHERAPY: ICD-10-CM

## 2017-01-01 DIAGNOSIS — F32.A DEPRESSION, UNSPECIFIED DEPRESSION TYPE: ICD-10-CM

## 2017-01-01 DIAGNOSIS — M89.8X9 BONE PAIN: ICD-10-CM

## 2017-01-01 DIAGNOSIS — D64.81 ANEMIA DUE TO CHEMOTHERAPY: ICD-10-CM

## 2017-01-01 DIAGNOSIS — R63.0 LACK OF APPETITE: ICD-10-CM

## 2017-01-01 DIAGNOSIS — K59.03 CONSTIPATION DUE TO OPIOID THERAPY: ICD-10-CM

## 2017-01-01 DIAGNOSIS — D69.6 THROMBOCYTOPENIA (HCC): ICD-10-CM

## 2017-01-01 DIAGNOSIS — Z71.89 OTHER SPECIFIED COUNSELING: Primary | ICD-10-CM

## 2017-01-01 DIAGNOSIS — M54.59 INTRACTABLE LOW BACK PAIN: ICD-10-CM

## 2017-01-01 DIAGNOSIS — R53.0 NEOPLASTIC MALIGNANT RELATED FATIGUE: Primary | ICD-10-CM

## 2017-01-01 DIAGNOSIS — E86.0 DEHYDRATION: ICD-10-CM

## 2017-01-01 DIAGNOSIS — R10.9 ABDOMINAL PAIN OF UNKNOWN ETIOLOGY: ICD-10-CM

## 2017-01-01 DIAGNOSIS — K59.03 DRUG-INDUCED CONSTIPATION: Primary | ICD-10-CM

## 2017-01-01 DIAGNOSIS — N28.9 RENAL INSUFFICIENCY: ICD-10-CM

## 2017-01-01 DIAGNOSIS — R06.02 SOB (SHORTNESS OF BREATH): ICD-10-CM
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
## 2017-01-01 DIAGNOSIS — D64.81 ANEMIA ASSOCIATED WITH CHEMOTHERAPY: ICD-10-CM

## 2017-01-01 DIAGNOSIS — R53.83 FATIGUE DUE TO TREATMENT: Primary | ICD-10-CM

## 2017-01-01 DIAGNOSIS — K59.09 OTHER CONSTIPATION: ICD-10-CM

## 2017-01-01 DIAGNOSIS — G47.00 INSOMNIA, UNSPECIFIED TYPE: ICD-10-CM

## 2017-01-01 DIAGNOSIS — N17.9 ACUTE RENAL FAILURE, UNSPECIFIED ACUTE RENAL FAILURE TYPE (HCC): ICD-10-CM

## 2017-01-01 DIAGNOSIS — N17.9 AKI (ACUTE KIDNEY INJURY) (HCC): ICD-10-CM

## 2017-01-01 DIAGNOSIS — M25.512 ACUTE PAIN OF BOTH SHOULDERS: ICD-10-CM

## 2017-01-01 DIAGNOSIS — Z63.6 CAREGIVER BURDEN: ICD-10-CM

## 2017-01-01 DIAGNOSIS — R05.8 PRODUCTIVE COUGH: ICD-10-CM

## 2017-01-01 DIAGNOSIS — R11.0 NAUSEA: ICD-10-CM

## 2017-01-01 DIAGNOSIS — N28.1 RENAL CYST: ICD-10-CM

## 2017-01-01 DIAGNOSIS — R63.0 POOR APPETITE: ICD-10-CM

## 2017-01-01 DIAGNOSIS — G89.3 PAIN DUE TO MALIGNANT NEOPLASM METASTATIC TO BONE (HCC): Primary | ICD-10-CM

## 2017-01-01 DIAGNOSIS — IMO0001 SPINAL COMPRESSION FRACTURE, WITH ROUTINE HEALING, SUBSEQUENT ENCOUNTER: ICD-10-CM

## 2017-01-01 DIAGNOSIS — D72.819 LEUKOPENIA, UNSPECIFIED TYPE: ICD-10-CM

## 2017-01-01 DIAGNOSIS — R53.0 NEOPLASTIC MALIGNANT RELATED FATIGUE: ICD-10-CM

## 2017-01-01 DIAGNOSIS — J00 OTHER ACUTE RHINITIS: ICD-10-CM

## 2017-01-01 DIAGNOSIS — M25.511 ACUTE PAIN OF BOTH SHOULDERS: ICD-10-CM

## 2017-01-01 DIAGNOSIS — R10.10 PAIN OF UPPER ABDOMEN: Primary | ICD-10-CM

## 2017-01-01 DIAGNOSIS — G89.3 MALIGNANT BONE PAIN: Primary | ICD-10-CM

## 2017-01-01 DIAGNOSIS — R53.1 WEAKNESS: ICD-10-CM

## 2017-01-01 DIAGNOSIS — G89.3 CANCER ASSOCIATED PAIN: Primary | ICD-10-CM

## 2017-01-01 DIAGNOSIS — J69.0 ACUTE ASPIRATION PNEUMONIA (HCC): ICD-10-CM

## 2017-01-01 DIAGNOSIS — M25.512 BILATERAL SHOULDER PAIN, UNSPECIFIED CHRONICITY: ICD-10-CM

## 2017-01-01 DIAGNOSIS — M89.8X9 MALIGNANT BONE PAIN: Primary | ICD-10-CM

## 2017-01-01 DIAGNOSIS — C90.00 MULTIPLE MYELOMA (HCC): ICD-10-CM

## 2017-01-01 DIAGNOSIS — R52 PAIN: Primary | ICD-10-CM

## 2017-01-01 DIAGNOSIS — R39.198 DIFFICULTY URINATING: ICD-10-CM

## 2017-01-01 DIAGNOSIS — R00.1 BRADYCARDIA: ICD-10-CM

## 2017-01-01 DIAGNOSIS — R19.7 DIARRHEA, UNSPECIFIED TYPE: ICD-10-CM

## 2017-01-01 DIAGNOSIS — Z71.89 ADVANCED DIRECTIVES, COUNSELING/DISCUSSION: Primary | ICD-10-CM

## 2017-01-01 DIAGNOSIS — C90.00 MULTIPLE MYELOMA (HCC): Primary | ICD-10-CM

## 2017-01-01 DIAGNOSIS — K64.9 HEMORRHOIDS, UNSPECIFIED HEMORRHOID TYPE: ICD-10-CM

## 2017-01-01 DIAGNOSIS — M84.48XA PATHOLOGIC LUMBAR VERTEBRAL FRACTURE, INITIAL ENCOUNTER: Primary | ICD-10-CM

## 2017-01-01 DIAGNOSIS — R53.1 WEAKNESS: Primary | ICD-10-CM

## 2017-01-01 DIAGNOSIS — G89.29 CHRONIC RUQ PAIN: ICD-10-CM

## 2017-01-01 DIAGNOSIS — M54.50 CHRONIC RIGHT-SIDED LOW BACK PAIN WITHOUT SCIATICA: ICD-10-CM

## 2017-01-01 DIAGNOSIS — D50.9 IRON DEFICIENCY ANEMIA, UNSPECIFIED IRON DEFICIENCY ANEMIA TYPE: ICD-10-CM

## 2017-01-01 DIAGNOSIS — D50.0 IRON DEFICIENCY ANEMIA DUE TO CHRONIC BLOOD LOSS: ICD-10-CM

## 2017-01-01 DIAGNOSIS — R10.9 INTRACTABLE ABDOMINAL PAIN: Primary | ICD-10-CM

## 2017-01-01 DIAGNOSIS — K59.00 CONSTIPATION, UNSPECIFIED CONSTIPATION TYPE: ICD-10-CM

## 2017-01-01 DIAGNOSIS — F41.8 DEPRESSION WITH ANXIETY: ICD-10-CM

## 2017-01-01 DIAGNOSIS — R53.1 WEAKNESS GENERALIZED: ICD-10-CM

## 2017-01-01 DIAGNOSIS — C79.51 PAIN DUE TO MALIGNANT NEOPLASM METASTATIC TO BONE (HCC): Primary | ICD-10-CM

## 2017-01-01 DIAGNOSIS — R05.9 COUGH: ICD-10-CM

## 2017-01-01 DIAGNOSIS — R10.10 PAIN OF UPPER ABDOMEN: ICD-10-CM

## 2017-01-01 DIAGNOSIS — Z72.0 SMOKING TRYING TO QUIT: ICD-10-CM

## 2017-01-01 DIAGNOSIS — S14.105A SPINAL CORD INJURY AT C5-C7 LEVEL WITHOUT INJURY OF SPINAL BONE, INITIAL ENCOUNTER (HCC): ICD-10-CM

## 2017-01-01 DIAGNOSIS — R53.1 WEAKNESS GENERALIZED: Primary | ICD-10-CM

## 2017-01-01 DIAGNOSIS — K85.00 IDIOPATHIC ACUTE PANCREATITIS, UNSPECIFIED COMPLICATION STATUS: Primary | ICD-10-CM

## 2017-01-01 DIAGNOSIS — S22.41XA CLOSED FRACTURE OF MULTIPLE RIBS OF RIGHT SIDE, INITIAL ENCOUNTER: Primary | ICD-10-CM

## 2017-01-01 DIAGNOSIS — R10.13 ABDOMINAL PAIN, ACUTE, EPIGASTRIC: ICD-10-CM

## 2017-01-01 DIAGNOSIS — M84.48XA PATHOLOGIC RIB FRACTURE, INITIAL ENCOUNTER: ICD-10-CM

## 2017-01-01 DIAGNOSIS — K29.70 GASTRITIS WITHOUT BLEEDING, UNSPECIFIED CHRONICITY, UNSPECIFIED GASTRITIS TYPE: ICD-10-CM

## 2017-01-01 DIAGNOSIS — R19.7 DIARRHEA OF PRESUMED INFECTIOUS ORIGIN: ICD-10-CM

## 2017-01-01 DIAGNOSIS — E87.6 HYPOKALEMIA: ICD-10-CM

## 2017-01-01 DIAGNOSIS — R07.89 CHEST WALL PAIN: Primary | ICD-10-CM

## 2017-01-01 DIAGNOSIS — K64.8 OTHER HEMORRHOIDS: ICD-10-CM
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
## 2017-01-01 DIAGNOSIS — J06.9 UPPER RESPIRATORY TRACT INFECTION, UNSPECIFIED TYPE: ICD-10-CM

## 2017-01-01 DIAGNOSIS — Z71.9 ENCOUNTER FOR EDUCATION: ICD-10-CM

## 2017-01-01 DIAGNOSIS — G89.29 CHRONIC RIGHT-SIDED LOW BACK PAIN WITHOUT SCIATICA: ICD-10-CM

## 2017-01-01 DIAGNOSIS — R10.11 CHRONIC RUQ PAIN: ICD-10-CM

## 2017-01-01 DIAGNOSIS — N28.9 RENAL INSUFFICIENCY: Primary | ICD-10-CM

## 2017-01-01 DIAGNOSIS — G89.29 OTHER CHRONIC PAIN: ICD-10-CM

## 2017-01-01 DIAGNOSIS — S22.41XG CLOSED FRACTURE OF MULTIPLE RIBS OF RIGHT SIDE WITH DELAYED HEALING, SUBSEQUENT ENCOUNTER: ICD-10-CM

## 2017-01-01 DIAGNOSIS — M54.59 INTRACTABLE LOW BACK PAIN: Primary | ICD-10-CM

## 2017-01-01 DIAGNOSIS — M25.511 BILATERAL SHOULDER PAIN, UNSPECIFIED CHRONICITY: ICD-10-CM

## 2017-01-01 LAB
A/G RATIO: 1.33
A/G RATIO: 1.97
A/G RATIO: 2.07
A/G RATIO: 2.12
A/G RATIO: 2.13
A/G RATIO: 2.16
A/G RATIO: 2.22
ALBUMIN SERPL-MCNC: 2.2 G/DL (ref 3.5–4.8)
ALBUMIN SERPL-MCNC: 2.2 G/DL (ref 3.5–4.8)
ALBUMIN SERPL-MCNC: 2.3 G/DL (ref 3.5–4.8)
ALBUMIN SERPL-MCNC: 2.5 G/DL (ref 3.5–4.8)
ALBUMIN SERPL-MCNC: 2.5 G/DL (ref 3.5–4.8)
ALBUMIN SERPL-MCNC: 2.8 G/DL (ref 3.5–4.8)
ALBUMIN SERPL-MCNC: 2.8 G/DL (ref 3.5–4.8)
ALBUMIN SERPL-MCNC: 2.9 G/DL (ref 3.5–4.8)
ALBUMIN SERPL-MCNC: 2.9 G/DL (ref 3.5–4.8)
ALBUMIN SERPL-MCNC: 3.1 G/DL (ref 3.5–4.8)
ALBUMIN SERPL-MCNC: 3.2 G/DL (ref 3.5–4.8)
ALBUMIN SERPL-MCNC: 3.3 G/DL (ref 3.5–4.8)
ALBUMIN SERPL-MCNC: 3.4 G/DL (ref 3.5–4.8)
ALBUMIN SERPL-MCNC: 3.5 G/DL (ref 3.5–4.8)
ALBUMIN SERPL-MCNC: 3.6 G/DL (ref 3.5–4.8)
ALBUMIN SERPL-MCNC: 3.6 G/DL (ref 3.5–4.8)
ALBUMIN SERPL-MCNC: 3.7 G/DL (ref 3.5–4.8)
ALBUMIN SERPL-MCNC: 3.7 G/DL (ref 3.5–4.8)
ALBUMIN SERPL-MCNC: 3.9 G/DL (ref 3.5–4.8)
ALBUMIN, SERUM: 2.63 G/DL (ref 3.5–4.8)
ALBUMIN, SERUM: 3.75 G/DL (ref 3.5–4.8)
ALBUMIN, SERUM: 3.8 G/DL (ref 3.5–4.8)
ALBUMIN, SERUM: 3.86 G/DL (ref 3.5–4.8)
ALBUMIN, SERUM: 4.04 G/DL (ref 3.5–4.8)
ALBUMIN, SERUM: 4.11 G/DL (ref 3.5–4.8)
ALBUMIN, SERUM: 4.38 G/DL (ref 3.5–4.8)
ALBUMIN, URINE: DETECTED
ALP LIVER SERPL-CCNC: 55 U/L (ref 45–117)
ALP LIVER SERPL-CCNC: 65 U/L (ref 45–117)
ALP LIVER SERPL-CCNC: 65 U/L (ref 45–117)
ALP LIVER SERPL-CCNC: 68 U/L (ref 45–117)
ALP LIVER SERPL-CCNC: 69 U/L (ref 45–117)
ALP LIVER SERPL-CCNC: 70 U/L (ref 45–117)
ALP LIVER SERPL-CCNC: 71 U/L (ref 45–117)
ALP LIVER SERPL-CCNC: 74 U/L (ref 45–117)
ALP LIVER SERPL-CCNC: 74 U/L (ref 45–117)
ALP LIVER SERPL-CCNC: 75 U/L (ref 45–117)
ALP LIVER SERPL-CCNC: 76 U/L (ref 45–117)
ALP LIVER SERPL-CCNC: 77 U/L (ref 45–117)
ALP LIVER SERPL-CCNC: 78 U/L (ref 45–117)
ALP LIVER SERPL-CCNC: 79 U/L (ref 45–117)
ALP LIVER SERPL-CCNC: 81 U/L (ref 45–117)
ALP LIVER SERPL-CCNC: 82 U/L (ref 45–117)
ALP LIVER SERPL-CCNC: 83 U/L (ref 45–117)
ALP LIVER SERPL-CCNC: 85 U/L (ref 45–117)
ALPHA-1 GLOBULIN: 0.22 G/DL (ref 0.1–0.3)
ALPHA-1 GLOBULIN: 0.23 G/DL (ref 0.1–0.3)
ALPHA-1 GLOBULIN: 0.24 G/DL (ref 0.1–0.3)
ALPHA-1 GLOBULIN: 0.24 G/DL (ref 0.1–0.3)
ALPHA-1 GLOBULIN: 0.25 G/DL (ref 0.1–0.3)
ALPHA-1 GLOBULIN: 0.26 G/DL (ref 0.1–0.3)
ALPHA-1 GLOBULIN: 0.36 G/DL (ref 0.1–0.3)
ALPHA-2 GLOBULIN: 0.71 G/DL (ref 0.6–1)
ALPHA-2 GLOBULIN: 0.72 G/DL (ref 0.6–1)
ALPHA-2 GLOBULIN: 0.72 G/DL (ref 0.6–1)
ALPHA-2 GLOBULIN: 0.75 G/DL (ref 0.6–1)
ALPHA-2 GLOBULIN: 0.79 G/DL (ref 0.6–1)
ALPHA-2 GLOBULIN: 0.81 G/DL (ref 0.6–1)
ALPHA-2 GLOBULIN: 0.83 G/DL (ref 0.6–1)
ALT SERPL-CCNC: 11 U/L (ref 17–63)
ALT SERPL-CCNC: 11 U/L (ref 17–63)
ALT SERPL-CCNC: 12 U/L (ref 17–63)
ALT SERPL-CCNC: 13 U/L (ref 17–63)
ALT SERPL-CCNC: 14 U/L (ref 17–63)
ALT SERPL-CCNC: 15 U/L (ref 17–63)
ALT SERPL-CCNC: 17 U/L (ref 17–63)
ALT SERPL-CCNC: 21 U/L (ref 17–63)
ALT SERPL-CCNC: 21 U/L (ref 17–63)
ALT SERPL-CCNC: 22 U/L (ref 17–63)
ALT SERPL-CCNC: 23 U/L (ref 17–63)
ALT SERPL-CCNC: 26 U/L (ref 17–63)
ALT SERPL-CCNC: 27 U/L (ref 17–63)
ALT SERPL-CCNC: 27 U/L (ref 17–63)
AMMONIA: 17 UMOL/L (ref 11–32)
AMYLASE: 2131 U/L (ref 25–115)
AMYLASE: 33 U/L (ref 25–115)
ANTIBODY SCREEN: NEGATIVE
ANTIBODY SCREEN: POSITIVE
APTT PPP: 31.5 SECONDS (ref 25–34)
AST SERPL-CCNC: 11 U/L (ref 15–41)
AST SERPL-CCNC: 11 U/L (ref 15–41)
AST SERPL-CCNC: 12 U/L (ref 15–41)
AST SERPL-CCNC: 13 U/L (ref 15–41)
AST SERPL-CCNC: 14 U/L (ref 15–41)
AST SERPL-CCNC: 14 U/L (ref 15–41)
AST SERPL-CCNC: 15 U/L (ref 15–41)
AST SERPL-CCNC: 15 U/L (ref 15–41)
AST SERPL-CCNC: 16 U/L (ref 15–41)
AST SERPL-CCNC: 17 U/L (ref 15–41)
AST SERPL-CCNC: 17 U/L (ref 15–41)
AST SERPL-CCNC: 18 U/L (ref 15–41)
AST SERPL-CCNC: 20 U/L (ref 15–41)
AST SERPL-CCNC: 21 U/L (ref 15–41)
AST SERPL-CCNC: 21 U/L (ref 15–41)
AST SERPL-CCNC: 24 U/L (ref 15–41)
AST SERPL-CCNC: 34 U/L (ref 15–41)
AST SERPL-CCNC: 42 U/L (ref 15–41)
AST SERPL-CCNC: 7 U/L (ref 15–41)
AST SERPL-CCNC: 9 U/L (ref 15–41)
AST SERPL-CCNC: 9 U/L (ref 15–41)
ATRIAL RATE: 64 BPM
ATRIAL RATE: 76 BPM
ATRIAL RATE: 85 BPM
ATRIAL RATE: 87 BPM
BASOPHILS # BLD AUTO: 0 X10(3) UL (ref 0–0.1)
BASOPHILS # BLD AUTO: 0.01 X10(3) UL (ref 0–0.1)
BASOPHILS # BLD AUTO: 0.02 X10(3) UL (ref 0–0.1)
BASOPHILS # BLD AUTO: 0.03 X10(3) UL (ref 0–0.1)
BASOPHILS # BLD AUTO: 0.04 X10(3) UL (ref 0–0.1)
BASOPHILS # BLD AUTO: 0.05 X10(3) UL (ref 0–0.1)
BASOPHILS # BLD AUTO: 0.06 X10(3) UL (ref 0–0.1)
BASOPHILS # BLD AUTO: 0.09 X10(3) UL (ref 0–0.1)
BASOPHILS # BLD AUTO: 0.12 X10(3) UL (ref 0–0.1)
BASOPHILS # BLD AUTO: 0.14 X10(3) UL (ref 0–0.1)
BASOPHILS NFR BLD AUTO: 0 %
BASOPHILS NFR BLD AUTO: 0.1 %
BASOPHILS NFR BLD AUTO: 0.2 %
BASOPHILS NFR BLD AUTO: 0.3 %
BASOPHILS NFR BLD AUTO: 0.4 %
BASOPHILS NFR BLD AUTO: 0.5 %
BASOPHILS NFR BLD AUTO: 0.6 %
BASOPHILS NFR BLD AUTO: 0.7 %
BASOPHILS NFR BLD AUTO: 0.8 %
BASOPHILS NFR BLD AUTO: 0.8 %
BASOPHILS NFR BLD AUTO: 0.9 %
BASOPHILS NFR BLD AUTO: 1 %
BASOPHILS NFR BLD AUTO: 1 %
BASOPHILS NFR BLD AUTO: 1.3 %
BASOPHILS NFR BLD AUTO: 1.4 %
BASOPHILS NFR BLD AUTO: 2.8 %
BASOPHILS NFR BLD AUTO: 2.8 %
BETA 2 MICROGLOBULIN: 0.24 MG/DL (ref 0.11–0.25)
BETA GLOBULIN: 0.59 G/DL (ref 0.7–1.2)
BETA GLOBULIN: 0.64 G/DL (ref 0.7–1.2)
BETA GLOBULIN: 0.64 G/DL (ref 0.7–1.2)
BETA GLOBULIN: 0.67 G/DL (ref 0.7–1.2)
BETA GLOBULIN: 0.7 G/DL (ref 0.7–1.2)
BETA GLOBULIN: 0.7 G/DL (ref 0.7–1.2)
BETA GLOBULIN: 0.72 G/DL (ref 0.7–1.2)
BILIRUB DIRECT SERPL-MCNC: <0.1 MG/DL (ref 0.1–0.5)
BILIRUB SERPL-MCNC: 0.2 MG/DL (ref 0.1–2)
BILIRUB SERPL-MCNC: 0.3 MG/DL (ref 0.1–2)
BILIRUB SERPL-MCNC: 0.4 MG/DL (ref 0.1–2)
BILIRUB SERPL-MCNC: 0.5 MG/DL (ref 0.1–2)
BILIRUB SERPL-MCNC: 0.7 MG/DL (ref 0.1–2)
BILIRUB SERPL-MCNC: 0.7 MG/DL (ref 0.1–2)
BILIRUB UR QL STRIP.AUTO: NEGATIVE
BILIRUB UR QL STRIP.AUTO: NEGATIVE
BLOOD TYPE BARCODE: 5100
BLOOD TYPE BARCODE: 5100
BUN BLD-MCNC: 10 MG/DL (ref 8–20)
BUN BLD-MCNC: 11 MG/DL (ref 8–20)
BUN BLD-MCNC: 12 MG/DL (ref 8–20)
BUN BLD-MCNC: 12 MG/DL (ref 8–20)
BUN BLD-MCNC: 13 MG/DL (ref 8–20)
BUN BLD-MCNC: 14 MG/DL (ref 8–20)
BUN BLD-MCNC: 15 MG/DL (ref 8–20)
BUN BLD-MCNC: 15 MG/DL (ref 8–20)
BUN BLD-MCNC: 16 MG/DL (ref 8–20)
BUN BLD-MCNC: 17 MG/DL (ref 8–20)
BUN BLD-MCNC: 18 MG/DL (ref 8–20)
BUN BLD-MCNC: 21 MG/DL (ref 8–20)
BUN BLD-MCNC: 22 MG/DL (ref 8–20)
BUN BLD-MCNC: 25 MG/DL (ref 8–20)
BUN BLD-MCNC: 35 MG/DL (ref 8–20)
BUN BLD-MCNC: 41 MG/DL (ref 8–20)
BUN BLD-MCNC: 47 MG/DL (ref 8–20)
BUN BLD-MCNC: 49 MG/DL (ref 8–20)
BUN BLD-MCNC: 8 MG/DL (ref 8–20)
BUN BLD-MCNC: 9 MG/DL (ref 8–20)
BUN BLD-MCNC: 9 MG/DL (ref 8–20)
CALCIUM BLD-MCNC: 6.8 MG/DL (ref 8.3–10.3)
CALCIUM BLD-MCNC: 6.9 MG/DL (ref 8.3–10.3)
CALCIUM BLD-MCNC: 7 MG/DL (ref 8.3–10.3)
CALCIUM BLD-MCNC: 7.3 MG/DL (ref 8.3–10.3)
CALCIUM BLD-MCNC: 7.5 MG/DL (ref 8.3–10.3)
CALCIUM BLD-MCNC: 7.6 MG/DL (ref 8.3–10.3)
CALCIUM BLD-MCNC: 7.6 MG/DL (ref 8.3–10.3)
CALCIUM BLD-MCNC: 7.7 MG/DL (ref 8.3–10.3)
CALCIUM BLD-MCNC: 7.7 MG/DL (ref 8.3–10.3)
CALCIUM BLD-MCNC: 8 MG/DL (ref 8.3–10.3)
CALCIUM BLD-MCNC: 8.1 MG/DL (ref 8.3–10.3)
CALCIUM BLD-MCNC: 8.3 MG/DL (ref 8.3–10.3)
CALCIUM BLD-MCNC: 8.4 MG/DL (ref 8.3–10.3)
CALCIUM BLD-MCNC: 8.5 MG/DL (ref 8.3–10.3)
CALCIUM BLD-MCNC: 8.6 MG/DL (ref 8.3–10.3)
CALCIUM BLD-MCNC: 8.7 MG/DL (ref 8.3–10.3)
CALCIUM BLD-MCNC: 8.9 MG/DL (ref 8.3–10.3)
CALCIUM BLD-MCNC: 9 MG/DL (ref 8.3–10.3)
CALCIUM BLD-MCNC: 9.1 MG/DL (ref 8.3–10.3)
CALCIUM BLD-MCNC: 9.3 MG/DL (ref 8.3–10.3)
CALCIUM BLD-MCNC: 9.3 MG/DL (ref 8.3–10.3)
CALCIUM BLD-MCNC: 9.4 MG/DL (ref 8.3–10.3)
CALCIUM BLD-MCNC: 9.5 MG/DL (ref 8.3–10.3)
CALCIUM BLD-MCNC: 9.7 MG/DL (ref 8.3–10.3)
CALCIUM BLD-MCNC: 9.8 MG/DL (ref 8.3–10.3)
CHLORIDE: 104 MMOL/L (ref 101–111)
CHLORIDE: 105 MMOL/L (ref 101–111)
CHLORIDE: 106 MMOL/L (ref 101–111)
CHLORIDE: 106 MMOL/L (ref 101–111)
CHLORIDE: 107 MMOL/L (ref 101–111)
CHLORIDE: 108 MMOL/L (ref 101–111)
CHLORIDE: 109 MMOL/L (ref 101–111)
CHLORIDE: 110 MMOL/L (ref 101–111)
CHLORIDE: 111 MMOL/L (ref 101–111)
CHLORIDE: 112 MMOL/L (ref 101–111)
CHLORIDE: 112 MMOL/L (ref 101–111)
CHLORIDE: 113 MMOL/L (ref 101–111)
CHLORIDE: 114 MMOL/L (ref 101–111)
CHLORIDE: 115 MMOL/L (ref 101–111)
CHLORIDE: 116 MMOL/L (ref 101–111)
CHLORIDE: 117 MMOL/L (ref 101–111)
CHLORIDE: 118 MMOL/L (ref 101–111)
CLARITY UR REFRACT.AUTO: CLEAR
CLARITY UR REFRACT.AUTO: CLEAR
CO2: 18 MMOL/L (ref 22–32)
CO2: 20 MMOL/L (ref 22–32)
CO2: 20 MMOL/L (ref 22–32)
CO2: 21 MMOL/L (ref 22–32)
CO2: 21 MMOL/L (ref 22–32)
CO2: 22 MMOL/L (ref 22–32)
CO2: 22 MMOL/L (ref 22–32)
CO2: 23 MMOL/L (ref 22–32)
CO2: 24 MMOL/L (ref 22–32)
CO2: 25 MMOL/L (ref 22–32)
CO2: 26 MMOL/L (ref 22–32)
CO2: 26 MMOL/L (ref 22–32)
CO2: 27 MMOL/L (ref 22–32)
CO2: 27 MMOL/L (ref 22–32)
COLOR UR AUTO: YELLOW
CREAT BLD-MCNC: 0.5 MG/DL (ref 0.7–1.3)
CREAT BLD-MCNC: 0.5 MG/DL (ref 0.7–1.3)
CREAT BLD-MCNC: 0.58 MG/DL (ref 0.7–1.3)
CREAT BLD-MCNC: 0.58 MG/DL (ref 0.7–1.3)
CREAT BLD-MCNC: 0.59 MG/DL (ref 0.7–1.3)
CREAT BLD-MCNC: 0.59 MG/DL (ref 0.7–1.3)
CREAT BLD-MCNC: 0.62 MG/DL (ref 0.7–1.3)
CREAT BLD-MCNC: 0.63 MG/DL (ref 0.7–1.3)
CREAT BLD-MCNC: 0.64 MG/DL (ref 0.7–1.3)
CREAT BLD-MCNC: 0.64 MG/DL (ref 0.7–1.3)
CREAT BLD-MCNC: 0.65 MG/DL (ref 0.7–1.3)
CREAT BLD-MCNC: 0.66 MG/DL (ref 0.7–1.3)
CREAT BLD-MCNC: 0.67 MG/DL (ref 0.7–1.3)
CREAT BLD-MCNC: 0.69 MG/DL (ref 0.7–1.3)
CREAT BLD-MCNC: 0.7 MG/DL (ref 0.7–1.3)
CREAT BLD-MCNC: 0.74 MG/DL (ref 0.7–1.3)
CREAT BLD-MCNC: 0.77 MG/DL (ref 0.7–1.3)
CREAT BLD-MCNC: 0.78 MG/DL (ref 0.7–1.3)
CREAT BLD-MCNC: 0.8 MG/DL (ref 0.7–1.3)
CREAT BLD-MCNC: 0.84 MG/DL (ref 0.7–1.3)
CREAT BLD-MCNC: 0.85 MG/DL (ref 0.7–1.3)
CREAT BLD-MCNC: 0.99 MG/DL (ref 0.7–1.3)
CREAT BLD-MCNC: 1.86 MG/DL (ref 0.7–1.3)
CREAT BLD-MCNC: 1.98 MG/DL (ref 0.7–1.3)
CREAT BLD-MCNC: 2.29 MG/DL (ref 0.7–1.3)
CREAT BLD-MCNC: 2.39 MG/DL (ref 0.7–1.3)
CREAT BLD-MCNC: 2.71 MG/DL (ref 0.7–1.3)
CREAT BLD-MCNC: 3.05 MG/DL (ref 0.7–1.3)
CREAT BLD-MCNC: 3.6 MG/DL (ref 0.7–1.3)
CREAT BLD-MCNC: 4.39 MG/DL (ref 0.7–1.3)
CREAT BLD-MCNC: 4.96 MG/DL (ref 0.7–1.3)
CREAT BLD-MCNC: 5.33 MG/DL (ref 0.7–1.3)
DEPRECATED HBV CORE AB SER IA-ACNC: 84.1 NG/ML (ref 22–322)
EOSINOPHIL # BLD AUTO: 0.02 X10(3) UL (ref 0–0.3)
EOSINOPHIL # BLD AUTO: 0.02 X10(3) UL (ref 0–0.3)
EOSINOPHIL # BLD AUTO: 0.04 X10(3) UL (ref 0–0.3)
EOSINOPHIL # BLD AUTO: 0.04 X10(3) UL (ref 0–0.3)
EOSINOPHIL # BLD AUTO: 0.05 X10(3) UL (ref 0–0.3)
EOSINOPHIL # BLD AUTO: 0.07 X10(3) UL (ref 0–0.3)
EOSINOPHIL # BLD AUTO: 0.07 X10(3) UL (ref 0–0.3)
EOSINOPHIL # BLD AUTO: 0.08 X10(3) UL (ref 0–0.3)
EOSINOPHIL # BLD AUTO: 0.09 X10(3) UL (ref 0–0.3)
EOSINOPHIL # BLD AUTO: 0.09 X10(3) UL (ref 0–0.3)
EOSINOPHIL # BLD AUTO: 0.1 X10(3) UL (ref 0–0.3)
EOSINOPHIL # BLD AUTO: 0.11 X10(3) UL (ref 0–0.3)
EOSINOPHIL # BLD AUTO: 0.16 X10(3) UL (ref 0–0.3)
EOSINOPHIL # BLD AUTO: 0.16 X10(3) UL (ref 0–0.3)
EOSINOPHIL # BLD AUTO: 0.17 X10(3) UL (ref 0–0.3)
EOSINOPHIL # BLD AUTO: 0.17 X10(3) UL (ref 0–0.3)
EOSINOPHIL # BLD AUTO: 0.18 X10(3) UL (ref 0–0.3)
EOSINOPHIL # BLD AUTO: 0.19 X10(3) UL (ref 0–0.3)
EOSINOPHIL # BLD AUTO: 0.2 X10(3) UL (ref 0–0.3)
EOSINOPHIL # BLD AUTO: 0.2 X10(3) UL (ref 0–0.3)
EOSINOPHIL # BLD AUTO: 0.21 X10(3) UL (ref 0–0.3)
EOSINOPHIL # BLD AUTO: 0.24 X10(3) UL (ref 0–0.3)
EOSINOPHIL # BLD AUTO: 0.27 X10(3) UL (ref 0–0.3)
EOSINOPHIL # BLD AUTO: 0.27 X10(3) UL (ref 0–0.3)
EOSINOPHIL # BLD AUTO: 0.28 X10(3) UL (ref 0–0.3)
EOSINOPHIL # BLD AUTO: 0.28 X10(3) UL (ref 0–0.3)
EOSINOPHIL # BLD AUTO: 0.29 X10(3) UL (ref 0–0.3)
EOSINOPHIL # BLD AUTO: 0.29 X10(3) UL (ref 0–0.3)
EOSINOPHIL # BLD AUTO: 0.31 X10(3) UL (ref 0–0.3)
EOSINOPHIL # BLD AUTO: 0.35 X10(3) UL (ref 0–0.3)
EOSINOPHIL # BLD AUTO: 0.57 X10(3) UL (ref 0–0.3)
EOSINOPHIL NFR BLD AUTO: 0.2 %
EOSINOPHIL NFR BLD AUTO: 0.3 %
EOSINOPHIL NFR BLD AUTO: 0.4 %
EOSINOPHIL NFR BLD AUTO: 0.7 %
EOSINOPHIL NFR BLD AUTO: 0.7 %
EOSINOPHIL NFR BLD AUTO: 0.8 %
EOSINOPHIL NFR BLD AUTO: 1 %
EOSINOPHIL NFR BLD AUTO: 1.3 %
EOSINOPHIL NFR BLD AUTO: 1.7 %
EOSINOPHIL NFR BLD AUTO: 1.7 %
EOSINOPHIL NFR BLD AUTO: 1.8 %
EOSINOPHIL NFR BLD AUTO: 1.8 %
EOSINOPHIL NFR BLD AUTO: 1.9 %
EOSINOPHIL NFR BLD AUTO: 2.1 %
EOSINOPHIL NFR BLD AUTO: 2.2 %
EOSINOPHIL NFR BLD AUTO: 2.2 %
EOSINOPHIL NFR BLD AUTO: 2.3 %
EOSINOPHIL NFR BLD AUTO: 2.3 %
EOSINOPHIL NFR BLD AUTO: 2.4 %
EOSINOPHIL NFR BLD AUTO: 2.6 %
EOSINOPHIL NFR BLD AUTO: 2.7 %
EOSINOPHIL NFR BLD AUTO: 2.8 %
EOSINOPHIL NFR BLD AUTO: 2.9 %
EOSINOPHIL NFR BLD AUTO: 3 %
EOSINOPHIL NFR BLD AUTO: 3.3 %
EOSINOPHIL NFR BLD AUTO: 3.7 %
EOSINOPHIL NFR BLD AUTO: 4 %
EOSINOPHIL NFR BLD AUTO: 4.1 %
EOSINOPHIL NFR BLD AUTO: 4.2 %
EOSINOPHIL NFR BLD AUTO: 4.8 %
EOSINOPHIL NFR BLD AUTO: 5.2 %
EOSINOPHIL NFR BLD AUTO: 5.3 %
EOSINOPHIL NFR BLD AUTO: 5.5 %
EOSINOPHIL NFR BLD AUTO: 5.6 %
EOSINOPHIL NFR BLD AUTO: 6 %
EOSINOPHIL NFR BLD AUTO: 6.2 %
EOSINOPHIL NFR BLD AUTO: 7.2 %
EOSINOPHIL NFR BLD AUTO: 9.8 %
ERYTHROCYTE [DISTWIDTH] IN BLOOD BY AUTOMATED COUNT: 14.5 % (ref 11.5–16)
ERYTHROCYTE [DISTWIDTH] IN BLOOD BY AUTOMATED COUNT: 14.6 % (ref 11.5–16)
ERYTHROCYTE [DISTWIDTH] IN BLOOD BY AUTOMATED COUNT: 14.7 % (ref 11.5–16)
ERYTHROCYTE [DISTWIDTH] IN BLOOD BY AUTOMATED COUNT: 14.8 % (ref 11.5–16)
ERYTHROCYTE [DISTWIDTH] IN BLOOD BY AUTOMATED COUNT: 14.9 % (ref 11.5–16)
ERYTHROCYTE [DISTWIDTH] IN BLOOD BY AUTOMATED COUNT: 15.2 % (ref 11.5–16)
ERYTHROCYTE [DISTWIDTH] IN BLOOD BY AUTOMATED COUNT: 15.3 % (ref 11.5–16)
ERYTHROCYTE [DISTWIDTH] IN BLOOD BY AUTOMATED COUNT: 15.4 % (ref 11.5–16)
ERYTHROCYTE [DISTWIDTH] IN BLOOD BY AUTOMATED COUNT: 15.6 % (ref 11.5–16)
ERYTHROCYTE [DISTWIDTH] IN BLOOD BY AUTOMATED COUNT: 15.6 % (ref 11.5–16)
ERYTHROCYTE [DISTWIDTH] IN BLOOD BY AUTOMATED COUNT: 15.8 % (ref 11.5–16)
ERYTHROCYTE [DISTWIDTH] IN BLOOD BY AUTOMATED COUNT: 15.9 % (ref 11.5–16)
ERYTHROCYTE [DISTWIDTH] IN BLOOD BY AUTOMATED COUNT: 15.9 % (ref 11.5–16)
ERYTHROCYTE [DISTWIDTH] IN BLOOD BY AUTOMATED COUNT: 16 % (ref 11.5–16)
ERYTHROCYTE [DISTWIDTH] IN BLOOD BY AUTOMATED COUNT: 16.1 % (ref 11.5–16)
ERYTHROCYTE [DISTWIDTH] IN BLOOD BY AUTOMATED COUNT: 16.3 % (ref 11.5–16)
ERYTHROCYTE [DISTWIDTH] IN BLOOD BY AUTOMATED COUNT: 17.9 % (ref 11.5–16)
ERYTHROCYTE [DISTWIDTH] IN BLOOD BY AUTOMATED COUNT: 18.2 % (ref 11.5–16)
ERYTHROCYTE [DISTWIDTH] IN BLOOD BY AUTOMATED COUNT: 18.3 % (ref 11.5–16)
ERYTHROCYTE [DISTWIDTH] IN BLOOD BY AUTOMATED COUNT: 18.6 % (ref 11.5–16)
ERYTHROCYTE [DISTWIDTH] IN BLOOD BY AUTOMATED COUNT: 18.7 % (ref 11.5–16)
ERYTHROCYTE [DISTWIDTH] IN BLOOD BY AUTOMATED COUNT: 18.7 % (ref 11.5–16)
ERYTHROCYTE [DISTWIDTH] IN BLOOD BY AUTOMATED COUNT: 18.8 % (ref 11.5–16)
ERYTHROCYTE [DISTWIDTH] IN BLOOD BY AUTOMATED COUNT: 19.1 % (ref 11.5–16)
ERYTHROCYTE [DISTWIDTH] IN BLOOD BY AUTOMATED COUNT: 19.2 % (ref 11.5–16)
ERYTHROCYTE [DISTWIDTH] IN BLOOD BY AUTOMATED COUNT: 19.2 % (ref 11.5–16)
ERYTHROCYTE [DISTWIDTH] IN BLOOD BY AUTOMATED COUNT: 19.3 % (ref 11.5–16)
ERYTHROCYTE [DISTWIDTH] IN BLOOD BY AUTOMATED COUNT: 19.4 % (ref 11.5–16)
ERYTHROCYTE [DISTWIDTH] IN BLOOD BY AUTOMATED COUNT: 19.5 % (ref 11.5–16)
ERYTHROCYTE [DISTWIDTH] IN BLOOD BY AUTOMATED COUNT: 19.6 % (ref 11.5–16)
ERYTHROCYTE [DISTWIDTH] IN BLOOD BY AUTOMATED COUNT: 19.8 % (ref 11.5–16)
ERYTHROCYTE [DISTWIDTH] IN BLOOD BY AUTOMATED COUNT: 19.9 % (ref 11.5–16)
ERYTHROCYTE [DISTWIDTH] IN BLOOD BY AUTOMATED COUNT: 19.9 % (ref 11.5–16)
FOLATE (FOLIC ACID), SERUM: 15.2 NG/ML (ref 8.7–24)
FREE UR KAPPA/LAMBDA RATIO: 804 RATIO
FREE UR LAMBDA EXCRETION/DAY: 1.5 MG/D
FREE UR LAMBDA LIGHT CHAIN: 0.25 MG/DL
FREE URINARY KAPPA EXCRETION/D: 1206 MG/D
FREE URINARY KAPPA LIGHT CHAIN: 201 MG/DL
GAMMA GLOBULIN: 0.16 G/DL (ref 0.6–1.6)
GAMMA GLOBULIN: 0.19 G/DL (ref 0.6–1.6)
GAMMA GLOBULIN: 0.2 G/DL (ref 0.6–1.6)
GAMMA GLOBULIN: 0.21 G/DL (ref 0.6–1.6)
GAMMA GLOBULIN: 0.21 G/DL (ref 0.6–1.6)
GAMMA GLOBULIN: 0.27 G/DL (ref 0.6–1.6)
GAMMA GLOBULIN: 0.28 G/DL (ref 0.6–1.6)
GAMMA, URINE: DETECTED
GLUCOSE BLD-MCNC: 102 MG/DL (ref 70–99)
GLUCOSE BLD-MCNC: 103 MG/DL (ref 65–99)
GLUCOSE BLD-MCNC: 103 MG/DL (ref 70–99)
GLUCOSE BLD-MCNC: 104 MG/DL (ref 70–99)
GLUCOSE BLD-MCNC: 104 MG/DL (ref 70–99)
GLUCOSE BLD-MCNC: 110 MG/DL (ref 70–99)
GLUCOSE BLD-MCNC: 115 MG/DL (ref 65–99)
GLUCOSE BLD-MCNC: 120 MG/DL (ref 65–99)
GLUCOSE BLD-MCNC: 123 MG/DL (ref 70–99)
GLUCOSE BLD-MCNC: 124 MG/DL (ref 70–99)
GLUCOSE BLD-MCNC: 124 MG/DL (ref 70–99)
GLUCOSE BLD-MCNC: 129 MG/DL (ref 70–99)
GLUCOSE BLD-MCNC: 134 MG/DL (ref 70–99)
GLUCOSE BLD-MCNC: 134 MG/DL (ref 70–99)
GLUCOSE BLD-MCNC: 140 MG/DL (ref 65–99)
GLUCOSE BLD-MCNC: 67 MG/DL (ref 70–99)
GLUCOSE BLD-MCNC: 67 MG/DL (ref 70–99)
GLUCOSE BLD-MCNC: 73 MG/DL (ref 70–99)
GLUCOSE BLD-MCNC: 76 MG/DL (ref 70–99)
GLUCOSE BLD-MCNC: 76 MG/DL (ref 70–99)
GLUCOSE BLD-MCNC: 77 MG/DL (ref 70–99)
GLUCOSE BLD-MCNC: 79 MG/DL (ref 65–99)
GLUCOSE BLD-MCNC: 80 MG/DL (ref 70–99)
GLUCOSE BLD-MCNC: 81 MG/DL (ref 70–99)
GLUCOSE BLD-MCNC: 85 MG/DL (ref 70–99)
GLUCOSE BLD-MCNC: 85 MG/DL (ref 70–99)
GLUCOSE BLD-MCNC: 89 MG/DL (ref 65–99)
GLUCOSE BLD-MCNC: 89 MG/DL (ref 70–99)
GLUCOSE BLD-MCNC: 90 MG/DL (ref 70–99)
GLUCOSE BLD-MCNC: 91 MG/DL (ref 70–99)
GLUCOSE BLD-MCNC: 92 MG/DL (ref 65–99)
GLUCOSE BLD-MCNC: 94 MG/DL (ref 70–99)
GLUCOSE BLD-MCNC: 95 MG/DL (ref 70–99)
GLUCOSE BLD-MCNC: 96 MG/DL (ref 65–99)
GLUCOSE BLD-MCNC: 97 MG/DL (ref 65–99)
GLUCOSE BLD-MCNC: 97 MG/DL (ref 70–99)
GLUCOSE BLD-MCNC: 99 MG/DL (ref 70–99)
GLUCOSE UR STRIP.AUTO-MCNC: NEGATIVE MG/DL
GLUCOSE UR STRIP.AUTO-MCNC: NEGATIVE MG/DL
HAPTOGLOBIN: 303 MG/DL (ref 30–200)
HAV IGM SER QL: 1.4 MG/DL (ref 1.7–3)
HAV IGM SER QL: 1.5 MG/DL (ref 1.7–3)
HAV IGM SER QL: 2.4 MG/DL (ref 1.7–3)
HCT VFR BLD AUTO: 21.5 % (ref 37–53)
HCT VFR BLD AUTO: 24.2 % (ref 37–53)
HCT VFR BLD AUTO: 24.2 % (ref 37–53)
HCT VFR BLD AUTO: 24.5 % (ref 37–53)
HCT VFR BLD AUTO: 25 % (ref 37–53)
HCT VFR BLD AUTO: 25 % (ref 37–53)
HCT VFR BLD AUTO: 25.4 % (ref 37–53)
HCT VFR BLD AUTO: 25.8 % (ref 37–53)
HCT VFR BLD AUTO: 26.3 % (ref 37–53)
HCT VFR BLD AUTO: 27.5 % (ref 37–53)
HCT VFR BLD AUTO: 27.8 % (ref 37–53)
HCT VFR BLD AUTO: 28.2 % (ref 37–53)
HCT VFR BLD AUTO: 28.3 % (ref 37–53)
HCT VFR BLD AUTO: 28.6 % (ref 37–53)
HCT VFR BLD AUTO: 28.7 % (ref 37–53)
HCT VFR BLD AUTO: 28.7 % (ref 37–53)
HCT VFR BLD AUTO: 29 % (ref 37–53)
HCT VFR BLD AUTO: 29.3 % (ref 37–53)
HCT VFR BLD AUTO: 29.5 % (ref 37–53)
HCT VFR BLD AUTO: 29.7 % (ref 37–53)
HCT VFR BLD AUTO: 29.8 % (ref 37–53)
HCT VFR BLD AUTO: 30.2 % (ref 37–53)
HCT VFR BLD AUTO: 30.3 % (ref 37–53)
HCT VFR BLD AUTO: 30.3 % (ref 37–53)
HCT VFR BLD AUTO: 30.5 % (ref 37–53)
HCT VFR BLD AUTO: 30.6 % (ref 37–53)
HCT VFR BLD AUTO: 30.8 % (ref 37–53)
HCT VFR BLD AUTO: 31.3 % (ref 37–53)
HCT VFR BLD AUTO: 31.4 % (ref 37–53)
HCT VFR BLD AUTO: 31.5 % (ref 37–53)
HCT VFR BLD AUTO: 31.9 % (ref 37–53)
HCT VFR BLD AUTO: 32.3 % (ref 37–53)
HCT VFR BLD AUTO: 32.5 % (ref 37–53)
HCT VFR BLD AUTO: 32.9 % (ref 37–53)
HCT VFR BLD AUTO: 33.4 % (ref 37–53)
HCT VFR BLD AUTO: 33.6 % (ref 37–53)
HCT VFR BLD AUTO: 35 % (ref 37–53)
HCT VFR BLD AUTO: 35 % (ref 37–53)
HCT VFR BLD AUTO: 36.9 % (ref 37–53)
HCT VFR BLD AUTO: 37.1 % (ref 37–53)
HGB BLD-MCNC: 10 G/DL (ref 13–17)
HGB BLD-MCNC: 10.1 G/DL (ref 13–17)
HGB BLD-MCNC: 10.2 G/DL (ref 13–17)
HGB BLD-MCNC: 10.2 G/DL (ref 13–17)
HGB BLD-MCNC: 10.3 G/DL (ref 13–17)
HGB BLD-MCNC: 10.3 G/DL (ref 13–17)
HGB BLD-MCNC: 10.4 G/DL (ref 13–17)
HGB BLD-MCNC: 10.5 G/DL (ref 13–17)
HGB BLD-MCNC: 10.6 G/DL (ref 13–17)
HGB BLD-MCNC: 10.9 G/DL (ref 13–17)
HGB BLD-MCNC: 11 G/DL (ref 13–17)
HGB BLD-MCNC: 11.1 G/DL (ref 13–17)
HGB BLD-MCNC: 11.3 G/DL (ref 13–17)
HGB BLD-MCNC: 11.8 G/DL (ref 13–17)
HGB BLD-MCNC: 12 G/DL (ref 13–17)
HGB BLD-MCNC: 6.9 G/DL (ref 13–17)
HGB BLD-MCNC: 7.7 G/DL (ref 13–17)
HGB BLD-MCNC: 7.8 G/DL (ref 13–17)
HGB BLD-MCNC: 7.9 G/DL (ref 13–17)
HGB BLD-MCNC: 8.2 G/DL (ref 13–17)
HGB BLD-MCNC: 8.3 G/DL (ref 13–17)
HGB BLD-MCNC: 8.5 G/DL (ref 13–17)
HGB BLD-MCNC: 8.5 G/DL (ref 13–17)
HGB BLD-MCNC: 8.7 G/DL (ref 13–17)
HGB BLD-MCNC: 8.9 G/DL (ref 13–17)
HGB BLD-MCNC: 8.9 G/DL (ref 13–17)
HGB BLD-MCNC: 9 G/DL (ref 13–17)
HGB BLD-MCNC: 9.1 G/DL (ref 13–17)
HGB BLD-MCNC: 9.1 G/DL (ref 13–17)
HGB BLD-MCNC: 9.2 G/DL (ref 13–17)
HGB BLD-MCNC: 9.3 G/DL (ref 13–17)
HGB BLD-MCNC: 9.3 G/DL (ref 13–17)
HGB BLD-MCNC: 9.4 G/DL (ref 13–17)
HGB BLD-MCNC: 9.4 G/DL (ref 13–17)
HGB BLD-MCNC: 9.5 G/DL (ref 13–17)
HGB BLD-MCNC: 9.6 G/DL (ref 13–17)
HGB BLD-MCNC: 9.7 G/DL (ref 13–17)
HGB BLD-MCNC: 9.8 G/DL (ref 13–17)
HGB BLD-MCNC: 9.8 G/DL (ref 13–17)
HGB BLD-MCNC: 9.9 G/DL (ref 13–17)
HOURS COLLECTED: 24 HR
IMMATURE GRANULOCYTE COUNT: 0.01 X10(3) UL (ref 0–1)
IMMATURE GRANULOCYTE COUNT: 0.02 X10(3) UL (ref 0–1)
IMMATURE GRANULOCYTE COUNT: 0.03 X10(3) UL (ref 0–1)
IMMATURE GRANULOCYTE COUNT: 0.04 X10(3) UL (ref 0–1)
IMMATURE GRANULOCYTE COUNT: 0.05 X10(3) UL (ref 0–1)
IMMATURE GRANULOCYTE COUNT: 0.06 X10(3) UL (ref 0–1)
IMMATURE GRANULOCYTE COUNT: 0.07 X10(3) UL (ref 0–1)
IMMATURE GRANULOCYTE COUNT: 0.07 X10(3) UL (ref 0–1)
IMMATURE GRANULOCYTE COUNT: 0.08 X10(3) UL (ref 0–1)
IMMATURE GRANULOCYTE COUNT: 0.09 X10(3) UL (ref 0–1)
IMMATURE GRANULOCYTE COUNT: 0.09 X10(3) UL (ref 0–1)
IMMATURE GRANULOCYTE COUNT: 0.1 X10(3) UL (ref 0–1)
IMMATURE GRANULOCYTE COUNT: 0.22 X10(3) UL (ref 0–1)
IMMATURE GRANULOCYTE COUNT: 0.32 X10(3) UL (ref 0–1)
IMMATURE GRANULOCYTE RATIO %: 0.1 %
IMMATURE GRANULOCYTE RATIO %: 0.2 %
IMMATURE GRANULOCYTE RATIO %: 0.3 %
IMMATURE GRANULOCYTE RATIO %: 0.4 %
IMMATURE GRANULOCYTE RATIO %: 0.5 %
IMMATURE GRANULOCYTE RATIO %: 0.6 %
IMMATURE GRANULOCYTE RATIO %: 0.7 %
IMMATURE GRANULOCYTE RATIO %: 0.8 %
IMMATURE GRANULOCYTE RATIO %: 0.9 %
IMMATURE GRANULOCYTE RATIO %: 1 %
IMMATURE GRANULOCYTE RATIO %: 1.1 %
IMMATURE GRANULOCYTE RATIO %: 1.2 %
IMMATURE GRANULOCYTE RATIO %: 1.3 %
IMMATURE GRANULOCYTE RATIO %: 1.5 %
IMMATURE GRANULOCYTE RATIO %: 1.7 %
IMMATURE GRANULOCYTE RATIO %: 1.9 %
IMMATURE GRANULOCYTE RATIO %: 2 %
IMMATURE GRANULOCYTE RATIO %: 2.3 %
IMMATURE GRANULOCYTE RATIO %: 2.4 %
IMMUNOGLOBULIN A: <7.83 MG/DL (ref 70–312)
IMMUNOGLOBULIN G: 137 MG/DL (ref 791–1643)
IMMUNOGLOBULIN G: 171 MG/DL (ref 791–1643)
IMMUNOGLOBULIN G: 177 MG/DL (ref 791–1643)
IMMUNOGLOBULIN G: 216 MG/DL (ref 791–1643)
IMMUNOGLOBULIN G: 250 MG/DL (ref 791–1643)
IMMUNOGLOBULIN G: 304 MG/DL (ref 791–1643)
IMMUNOGLOBULIN M: 5.7 MG/DL (ref 43–279)
IMMUNOGLOBULIN M: 7.3 MG/DL (ref 43–279)
IMMUNOGLOBULIN M: 7.9 MG/DL (ref 43–279)
IMMUNOGLOBULIN M: <5.3 MG/DL (ref 43–279)
INR BLD: 1.03 (ref 0.89–1.11)
INR BLD: 1.29 (ref 0.89–1.11)
INR: 1 (ref 0.8–1.3)
IRON SATURATION: 7 % (ref 13–45)
IRON: 16 UG/DL (ref 45–182)
KAPPA FREE LIGHT CHAIN: 110.55 MG/DL (ref 0.33–1.94)
KAPPA FREE LIGHT CHAIN: 153.5 MG/DL (ref 0.33–1.94)
KAPPA FREE LIGHT CHAIN: 19.46 MG/DL (ref 0.33–1.94)
KAPPA FREE LIGHT CHAIN: 20.01 MG/DL (ref 0.33–1.94)
KAPPA FREE LIGHT CHAIN: 34.12 MG/DL (ref 0.33–1.94)
KAPPA FREE LIGHT CHAIN: 41.78 MG/DL (ref 0.33–1.94)
KAPPA FREE LIGHT CHAIN: 59.33 MG/DL (ref 0.33–1.94)
KAPPA/LAMBDA FLC RATIO: 274.12 (ref 0.26–1.65)
KAPPA/LAMBDA FLC RATIO: 42.96 (ref 0.26–1.65)
KAPPA/LAMBDA FLC RATIO: 487.43 (ref 0.26–1.65)
KAPPA/LAMBDA FLC RATIO: >1842.43 (ref 0.26–1.65)
KAPPA/LAMBDA FLC RATIO: >988.9 (ref 0.26–1.65)
KETONES UR STRIP.AUTO-MCNC: NEGATIVE MG/DL
KETONES UR STRIP.AUTO-MCNC: NEGATIVE MG/DL
LAMBDA FREE LIGHT CHAIN: 0.07 MG/DL (ref 0.57–2.63)
LAMBDA FREE LIGHT CHAIN: 0.07 MG/DL (ref 0.57–2.63)
LAMBDA FREE LIGHT CHAIN: 0.45 MG/DL (ref 0.57–2.63)
LAMBDA FREE LIGHT CHAIN: <0.06 MG/DL (ref 0.57–2.63)
LDH: 156 U/L (ref 84–249)
LDH: 178 U/L (ref 84–249)
LDH: 190 U/L (ref 84–249)
LDH: 229 U/L (ref 84–249)
LDH: 308 U/L (ref 84–249)
LDH: 349 U/L (ref 84–249)
LEUKOCYTE ESTERASE UR QL STRIP.AUTO: NEGATIVE
LEUKOCYTE ESTERASE UR QL STRIP.AUTO: NEGATIVE
LIPASE: 109 U/L (ref 73–393)
LIPASE: 3822 U/L (ref 73–393)
LIPASE: 71 U/L (ref 73–393)
LIPASE: 76 U/L (ref 73–393)
LIPASE: 82 U/L (ref 73–393)
LYMPHOCYTES # BLD AUTO: 0.11 X10(3) UL (ref 0.9–4)
LYMPHOCYTES # BLD AUTO: 0.11 X10(3) UL (ref 0.9–4)
LYMPHOCYTES # BLD AUTO: 0.16 X10(3) UL (ref 0.9–4)
LYMPHOCYTES # BLD AUTO: 0.17 X10(3) UL (ref 0.9–4)
LYMPHOCYTES # BLD AUTO: 0.2 X10(3) UL (ref 0.9–4)
LYMPHOCYTES # BLD AUTO: 0.23 X10(3) UL (ref 0.9–4)
LYMPHOCYTES # BLD AUTO: 0.23 X10(3) UL (ref 0.9–4)
LYMPHOCYTES # BLD AUTO: 0.24 X10(3) UL (ref 0.9–4)
LYMPHOCYTES # BLD AUTO: 0.24 X10(3) UL (ref 0.9–4)
LYMPHOCYTES # BLD AUTO: 0.25 X10(3) UL (ref 0.9–4)
LYMPHOCYTES # BLD AUTO: 0.25 X10(3) UL (ref 0.9–4)
LYMPHOCYTES # BLD AUTO: 0.26 X10(3) UL (ref 0.9–4)
LYMPHOCYTES # BLD AUTO: 0.27 X10(3) UL (ref 0.9–4)
LYMPHOCYTES # BLD AUTO: 0.28 X10(3) UL (ref 0.9–4)
LYMPHOCYTES # BLD AUTO: 0.28 X10(3) UL (ref 0.9–4)
LYMPHOCYTES # BLD AUTO: 0.33 X10(3) UL (ref 0.9–4)
LYMPHOCYTES # BLD AUTO: 0.33 X10(3) UL (ref 0.9–4)
LYMPHOCYTES # BLD AUTO: 0.35 X10(3) UL (ref 0.9–4)
LYMPHOCYTES # BLD AUTO: 0.36 X10(3) UL (ref 0.9–4)
LYMPHOCYTES # BLD AUTO: 0.37 X10(3) UL (ref 0.9–4)
LYMPHOCYTES # BLD AUTO: 0.38 X10(3) UL (ref 0.9–4)
LYMPHOCYTES # BLD AUTO: 0.42 X10(3) UL (ref 0.9–4)
LYMPHOCYTES # BLD AUTO: 0.47 X10(3) UL (ref 0.9–4)
LYMPHOCYTES # BLD AUTO: 0.47 X10(3) UL (ref 0.9–4)
LYMPHOCYTES # BLD AUTO: 0.52 X10(3) UL (ref 0.9–4)
LYMPHOCYTES # BLD AUTO: 0.58 X10(3) UL (ref 0.9–4)
LYMPHOCYTES # BLD AUTO: 0.65 X10(3) UL (ref 0.9–4)
LYMPHOCYTES # BLD AUTO: 0.74 X10(3) UL (ref 0.9–4)
LYMPHOCYTES # BLD AUTO: 0.77 X10(3) UL (ref 0.9–4)
LYMPHOCYTES # BLD AUTO: 0.78 X10(3) UL (ref 0.9–4)
LYMPHOCYTES # BLD AUTO: 0.87 X10(3) UL (ref 0.9–4)
LYMPHOCYTES # BLD AUTO: 0.87 X10(3) UL (ref 0.9–4)
LYMPHOCYTES # BLD AUTO: 0.88 X10(3) UL (ref 0.9–4)
LYMPHOCYTES # BLD AUTO: 0.89 X10(3) UL (ref 0.9–4)
LYMPHOCYTES # BLD AUTO: 0.91 X10(3) UL (ref 0.9–4)
LYMPHOCYTES # BLD AUTO: 0.92 X10(3) UL (ref 0.9–4)
LYMPHOCYTES # BLD AUTO: 0.94 X10(3) UL (ref 0.9–4)
LYMPHOCYTES # BLD AUTO: 1.01 X10(3) UL (ref 0.9–4)
LYMPHOCYTES # BLD AUTO: 1.07 X10(3) UL (ref 0.9–4)
LYMPHOCYTES # BLD AUTO: 1.33 X10(3) UL (ref 0.9–4)
LYMPHOCYTES NFR BLD AUTO: 1.9 %
LYMPHOCYTES NFR BLD AUTO: 10 %
LYMPHOCYTES NFR BLD AUTO: 10.1 %
LYMPHOCYTES NFR BLD AUTO: 11.3 %
LYMPHOCYTES NFR BLD AUTO: 11.4 %
LYMPHOCYTES NFR BLD AUTO: 11.8 %
LYMPHOCYTES NFR BLD AUTO: 12.5 %
LYMPHOCYTES NFR BLD AUTO: 13.2 %
LYMPHOCYTES NFR BLD AUTO: 13.6 %
LYMPHOCYTES NFR BLD AUTO: 13.8 %
LYMPHOCYTES NFR BLD AUTO: 13.8 %
LYMPHOCYTES NFR BLD AUTO: 14.7 %
LYMPHOCYTES NFR BLD AUTO: 15 %
LYMPHOCYTES NFR BLD AUTO: 15.6 %
LYMPHOCYTES NFR BLD AUTO: 17.2 %
LYMPHOCYTES NFR BLD AUTO: 18.3 %
LYMPHOCYTES NFR BLD AUTO: 2.4 %
LYMPHOCYTES NFR BLD AUTO: 2.6 %
LYMPHOCYTES NFR BLD AUTO: 2.9 %
LYMPHOCYTES NFR BLD AUTO: 3.4 %
LYMPHOCYTES NFR BLD AUTO: 4 %
LYMPHOCYTES NFR BLD AUTO: 4.6 %
LYMPHOCYTES NFR BLD AUTO: 4.8 %
LYMPHOCYTES NFR BLD AUTO: 4.9 %
LYMPHOCYTES NFR BLD AUTO: 5 %
LYMPHOCYTES NFR BLD AUTO: 5.3 %
LYMPHOCYTES NFR BLD AUTO: 5.4 %
LYMPHOCYTES NFR BLD AUTO: 5.7 %
LYMPHOCYTES NFR BLD AUTO: 6.1 %
LYMPHOCYTES NFR BLD AUTO: 6.9 %
LYMPHOCYTES NFR BLD AUTO: 7 %
LYMPHOCYTES NFR BLD AUTO: 7.3 %
LYMPHOCYTES NFR BLD AUTO: 7.6 %
LYMPHOCYTES NFR BLD AUTO: 8 %
LYMPHOCYTES NFR BLD AUTO: 8.3 %
LYMPHOCYTES NFR BLD AUTO: 8.5 %
LYMPHOCYTES NFR BLD AUTO: 9 %
LYMPHOCYTES NFR BLD AUTO: 9.1 %
M PROTEIN MFR SERPL ELPH: 4.9 G/DL (ref 6.1–8.3)
M PROTEIN MFR SERPL ELPH: 5.2 G/DL (ref 6.1–8.3)
M PROTEIN MFR SERPL ELPH: 5.3 G/DL (ref 6.1–8.3)
M PROTEIN MFR SERPL ELPH: 5.5 G/DL (ref 6.1–8.3)
M PROTEIN MFR SERPL ELPH: 5.6 G/DL (ref 6.1–8.3)
M PROTEIN MFR SERPL ELPH: 5.7 G/DL (ref 6.1–8.3)
M PROTEIN MFR SERPL ELPH: 5.8 G/DL (ref 6.1–8.3)
M PROTEIN MFR SERPL ELPH: 5.9 G/DL (ref 6.1–8.3)
M PROTEIN MFR SERPL ELPH: 6 G/DL (ref 6.1–8.3)
M PROTEIN MFR SERPL ELPH: 6 G/DL (ref 6.1–8.3)
M PROTEIN MFR SERPL ELPH: 6.1 G/DL (ref 6.1–8.3)
M PROTEIN MFR SERPL ELPH: 6.3 G/DL (ref 6.1–8.3)
M PROTEIN MFR SERPL ELPH: 6.3 G/DL (ref 6.1–8.3)
M PROTEIN MFR SERPL ELPH: 6.4 G/DL (ref 6.1–8.3)
M PROTEIN MFR SERPL ELPH: 6.5 G/DL (ref 6.1–8.3)
M PROTEIN MFR SERPL ELPH: 6.7 G/DL (ref 6.1–8.3)
M-SPIKE 1: 0.03 G/DL (ref ?–0)
M-SPIKE 1: 0.08 G/DL (ref ?–0)
M-SPIKE 1: 0.1 G/DL (ref ?–0)
MCH RBC QN AUTO: 25.9 PG (ref 27–33.2)
MCH RBC QN AUTO: 25.9 PG (ref 27–33.2)
MCH RBC QN AUTO: 26 PG (ref 27–33.2)
MCH RBC QN AUTO: 26.1 PG (ref 27–33.2)
MCH RBC QN AUTO: 26.2 PG (ref 27–33.2)
MCH RBC QN AUTO: 26.7 PG (ref 27–33.2)
MCH RBC QN AUTO: 27.1 PG (ref 27–33.2)
MCH RBC QN AUTO: 27.3 PG (ref 27–33.2)
MCH RBC QN AUTO: 27.4 PG (ref 27–33.2)
MCH RBC QN AUTO: 27.5 PG (ref 27–33.2)
MCH RBC QN AUTO: 27.5 PG (ref 27–33.2)
MCH RBC QN AUTO: 27.7 PG (ref 27–33.2)
MCH RBC QN AUTO: 28 PG (ref 27–33.2)
MCH RBC QN AUTO: 28.1 PG (ref 27–33.2)
MCH RBC QN AUTO: 28.1 PG (ref 27–33.2)
MCH RBC QN AUTO: 28.2 PG (ref 27–33.2)
MCH RBC QN AUTO: 28.3 PG (ref 27–33.2)
MCH RBC QN AUTO: 28.3 PG (ref 27–33.2)
MCH RBC QN AUTO: 28.4 PG (ref 27–33.2)
MCH RBC QN AUTO: 28.5 PG (ref 27–33.2)
MCH RBC QN AUTO: 28.5 PG (ref 27–33.2)
MCH RBC QN AUTO: 28.6 PG (ref 27–33.2)
MCH RBC QN AUTO: 28.8 PG (ref 27–33.2)
MCH RBC QN AUTO: 28.8 PG (ref 27–33.2)
MCH RBC QN AUTO: 29.1 PG (ref 27–33.2)
MCH RBC QN AUTO: 29.5 PG (ref 27–33.2)
MCH RBC QN AUTO: 29.5 PG (ref 27–33.2)
MCH RBC QN AUTO: 29.6 PG (ref 27–33.2)
MCH RBC QN AUTO: 29.7 PG (ref 27–33.2)
MCH RBC QN AUTO: 30.1 PG (ref 27–33.2)
MCH RBC QN AUTO: 30.4 PG (ref 27–33.2)
MCH RBC QN AUTO: 30.4 PG (ref 27–33.2)
MCH RBC QN AUTO: 30.6 PG (ref 27–33.2)
MCH RBC QN AUTO: 31.3 PG (ref 27–33.2)
MCH RBC QN AUTO: 31.3 PG (ref 27–33.2)
MCHC RBC AUTO-ENTMCNC: 30.7 G/DL (ref 31–37)
MCHC RBC AUTO-ENTMCNC: 30.8 G/DL (ref 31–37)
MCHC RBC AUTO-ENTMCNC: 31 G/DL (ref 31–37)
MCHC RBC AUTO-ENTMCNC: 31.5 G/DL (ref 31–37)
MCHC RBC AUTO-ENTMCNC: 31.6 G/DL (ref 31–37)
MCHC RBC AUTO-ENTMCNC: 31.7 G/DL (ref 31–37)
MCHC RBC AUTO-ENTMCNC: 31.7 G/DL (ref 31–37)
MCHC RBC AUTO-ENTMCNC: 31.8 G/DL (ref 31–37)
MCHC RBC AUTO-ENTMCNC: 31.9 G/DL (ref 31–37)
MCHC RBC AUTO-ENTMCNC: 32 G/DL (ref 31–37)
MCHC RBC AUTO-ENTMCNC: 32.1 G/DL (ref 31–37)
MCHC RBC AUTO-ENTMCNC: 32.1 G/DL (ref 31–37)
MCHC RBC AUTO-ENTMCNC: 32.2 G/DL (ref 31–37)
MCHC RBC AUTO-ENTMCNC: 32.3 G/DL (ref 31–37)
MCHC RBC AUTO-ENTMCNC: 32.3 G/DL (ref 31–37)
MCHC RBC AUTO-ENTMCNC: 32.4 G/DL (ref 31–37)
MCHC RBC AUTO-ENTMCNC: 32.5 G/DL (ref 31–37)
MCHC RBC AUTO-ENTMCNC: 32.6 G/DL (ref 31–37)
MCHC RBC AUTO-ENTMCNC: 32.7 G/DL (ref 31–37)
MCHC RBC AUTO-ENTMCNC: 32.7 G/DL (ref 31–37)
MCHC RBC AUTO-ENTMCNC: 32.8 G/DL (ref 31–37)
MCHC RBC AUTO-ENTMCNC: 32.9 G/DL (ref 31–37)
MCHC RBC AUTO-ENTMCNC: 32.9 G/DL (ref 31–37)
MCHC RBC AUTO-ENTMCNC: 33 G/DL (ref 31–37)
MCHC RBC AUTO-ENTMCNC: 33.1 G/DL (ref 31–37)
MCHC RBC AUTO-ENTMCNC: 33.3 G/DL (ref 31–37)
MCHC RBC AUTO-ENTMCNC: 33.5 G/DL (ref 31–37)
MCHC RBC AUTO-ENTMCNC: 33.6 G/DL (ref 31–37)
MCHC RBC AUTO-ENTMCNC: 33.8 G/DL (ref 31–37)
MCHC RBC AUTO-ENTMCNC: 34.3 G/DL (ref 31–37)
MCV RBC AUTO: 81.8 FL (ref 80–99)
MCV RBC AUTO: 82.4 FL (ref 80–99)
MCV RBC AUTO: 83.4 FL (ref 80–99)
MCV RBC AUTO: 83.9 FL (ref 80–99)
MCV RBC AUTO: 83.9 FL (ref 80–99)
MCV RBC AUTO: 84 FL (ref 80–99)
MCV RBC AUTO: 84.7 FL (ref 80–99)
MCV RBC AUTO: 84.8 FL (ref 80–99)
MCV RBC AUTO: 84.9 FL (ref 80–99)
MCV RBC AUTO: 84.9 FL (ref 80–99)
MCV RBC AUTO: 85 FL (ref 80–99)
MCV RBC AUTO: 85.1 FL (ref 80–99)
MCV RBC AUTO: 85.3 FL (ref 80–99)
MCV RBC AUTO: 86.3 FL (ref 80–99)
MCV RBC AUTO: 86.4 FL (ref 80–99)
MCV RBC AUTO: 86.8 FL (ref 80–99)
MCV RBC AUTO: 87.1 FL (ref 80–99)
MCV RBC AUTO: 87.1 FL (ref 80–99)
MCV RBC AUTO: 87.3 FL (ref 80–99)
MCV RBC AUTO: 87.3 FL (ref 80–99)
MCV RBC AUTO: 87.8 FL (ref 80–99)
MCV RBC AUTO: 88 FL (ref 80–99)
MCV RBC AUTO: 88.7 FL (ref 80–99)
MCV RBC AUTO: 88.8 FL (ref 80–99)
MCV RBC AUTO: 89.7 FL (ref 80–99)
MCV RBC AUTO: 89.8 FL (ref 80–99)
MCV RBC AUTO: 90.5 FL (ref 80–99)
MCV RBC AUTO: 90.9 FL (ref 80–99)
MCV RBC AUTO: 91.4 FL (ref 80–99)
MCV RBC AUTO: 91.8 FL (ref 80–99)
MCV RBC AUTO: 92 FL (ref 80–99)
MCV RBC AUTO: 92.7 FL (ref 80–99)
MCV RBC AUTO: 93.3 FL (ref 80–99)
MCV RBC AUTO: 93.4 FL (ref 80–99)
MCV RBC AUTO: 93.4 FL (ref 80–99)
MCV RBC AUTO: 93.8 FL (ref 80–99)
MCV RBC AUTO: 95 FL (ref 80–99)
MCV RBC AUTO: 96.2 FL (ref 80–99)
MMA: 0.2 UMOL/L
MONOCYTES # BLD AUTO: 0.24 X10(3) UL (ref 0.1–0.6)
MONOCYTES # BLD AUTO: 0.28 X10(3) UL (ref 0.1–0.6)
MONOCYTES # BLD AUTO: 0.31 X10(3) UL (ref 0.1–0.6)
MONOCYTES # BLD AUTO: 0.31 X10(3) UL (ref 0.1–0.6)
MONOCYTES # BLD AUTO: 0.32 X10(3) UL (ref 0.1–0.6)
MONOCYTES # BLD AUTO: 0.32 X10(3) UL (ref 0.1–0.6)
MONOCYTES # BLD AUTO: 0.35 X10(3) UL (ref 0.1–0.6)
MONOCYTES # BLD AUTO: 0.38 X10(3) UL (ref 0.1–0.6)
MONOCYTES # BLD AUTO: 0.41 X10(3) UL (ref 0.1–0.6)
MONOCYTES # BLD AUTO: 0.46 X10(3) UL (ref 0.1–0.6)
MONOCYTES # BLD AUTO: 0.46 X10(3) UL (ref 0.1–0.6)
MONOCYTES # BLD AUTO: 0.49 X10(3) UL (ref 0.1–0.6)
MONOCYTES # BLD AUTO: 0.54 X10(3) UL (ref 0.1–0.6)
MONOCYTES # BLD AUTO: 0.55 X10(3) UL (ref 0.1–0.6)
MONOCYTES # BLD AUTO: 0.57 X10(3) UL (ref 0.1–0.6)
MONOCYTES # BLD AUTO: 0.57 X10(3) UL (ref 0.1–0.6)
MONOCYTES # BLD AUTO: 0.58 X10(3) UL (ref 0.1–0.6)
MONOCYTES # BLD AUTO: 0.6 X10(3) UL (ref 0.1–0.6)
MONOCYTES # BLD AUTO: 0.61 X10(3) UL (ref 0.1–0.6)
MONOCYTES # BLD AUTO: 0.61 X10(3) UL (ref 0.1–0.6)
MONOCYTES # BLD AUTO: 0.65 X10(3) UL (ref 0.1–0.6)
MONOCYTES # BLD AUTO: 0.66 X10(3) UL (ref 0.1–0.6)
MONOCYTES # BLD AUTO: 0.67 X10(3) UL (ref 0.1–0.6)
MONOCYTES # BLD AUTO: 0.69 X10(3) UL (ref 0.1–0.6)
MONOCYTES # BLD AUTO: 0.72 X10(3) UL (ref 0.1–0.6)
MONOCYTES # BLD AUTO: 0.74 X10(3) UL (ref 0.1–0.6)
MONOCYTES # BLD AUTO: 0.8 X10(3) UL (ref 0.1–0.6)
MONOCYTES # BLD AUTO: 0.8 X10(3) UL (ref 0.1–0.6)
MONOCYTES # BLD AUTO: 0.85 X10(3) UL (ref 0.1–0.6)
MONOCYTES # BLD AUTO: 0.9 X10(3) UL (ref 0.1–0.6)
MONOCYTES # BLD AUTO: 0.93 X10(3) UL (ref 0.1–0.6)
MONOCYTES # BLD AUTO: 0.94 X10(3) UL (ref 0.1–0.6)
MONOCYTES # BLD AUTO: 0.95 X10(3) UL (ref 0.1–0.6)
MONOCYTES # BLD AUTO: 1.07 X10(3) UL (ref 0.1–0.6)
MONOCYTES # BLD AUTO: 1.16 X10(3) UL (ref 0.1–0.6)
MONOCYTES # BLD AUTO: 1.87 X10(3) UL (ref 0.1–0.6)
MONOCYTES NFR BLD AUTO: 10.2 %
MONOCYTES NFR BLD AUTO: 10.3 %
MONOCYTES NFR BLD AUTO: 10.4 %
MONOCYTES NFR BLD AUTO: 10.4 %
MONOCYTES NFR BLD AUTO: 10.5 %
MONOCYTES NFR BLD AUTO: 10.7 %
MONOCYTES NFR BLD AUTO: 11.2 %
MONOCYTES NFR BLD AUTO: 11.3 %
MONOCYTES NFR BLD AUTO: 11.3 %
MONOCYTES NFR BLD AUTO: 12.3 %
MONOCYTES NFR BLD AUTO: 12.4 %
MONOCYTES NFR BLD AUTO: 13.1 %
MONOCYTES NFR BLD AUTO: 13.3 %
MONOCYTES NFR BLD AUTO: 13.4 %
MONOCYTES NFR BLD AUTO: 13.7 %
MONOCYTES NFR BLD AUTO: 13.7 %
MONOCYTES NFR BLD AUTO: 14 %
MONOCYTES NFR BLD AUTO: 14 %
MONOCYTES NFR BLD AUTO: 16 %
MONOCYTES NFR BLD AUTO: 18.8 %
MONOCYTES NFR BLD AUTO: 5.2 %
MONOCYTES NFR BLD AUTO: 6 %
MONOCYTES NFR BLD AUTO: 6.7 %
MONOCYTES NFR BLD AUTO: 7.2 %
MONOCYTES NFR BLD AUTO: 7.2 %
MONOCYTES NFR BLD AUTO: 7.7 %
MONOCYTES NFR BLD AUTO: 7.9 %
MONOCYTES NFR BLD AUTO: 8.3 %
MONOCYTES NFR BLD AUTO: 8.4 %
MONOCYTES NFR BLD AUTO: 8.5 %
MONOCYTES NFR BLD AUTO: 8.6 %
MONOCYTES NFR BLD AUTO: 8.6 %
MONOCYTES NFR BLD AUTO: 9 %
MONOCYTES NFR BLD AUTO: 9.2 %
MONOCYTES NFR BLD AUTO: 9.4 %
MONOCYTES NFR BLD AUTO: 9.8 %
MONOCYTES NFR BLD AUTO: 9.9 %
MONOCYTES NFR BLD AUTO: 9.9 %
NEUTROPHIL ABS PRELIM: 1.96 X10 (3) UL (ref 1.3–6.7)
NEUTROPHIL ABS PRELIM: 10.37 X10 (3) UL (ref 1.3–6.7)
NEUTROPHIL ABS PRELIM: 2.22 X10 (3) UL (ref 1.3–6.7)
NEUTROPHIL ABS PRELIM: 2.48 X10 (3) UL (ref 1.3–6.7)
NEUTROPHIL ABS PRELIM: 2.59 X10 (3) UL (ref 1.3–6.7)
NEUTROPHIL ABS PRELIM: 2.66 X10 (3) UL (ref 1.3–6.7)
NEUTROPHIL ABS PRELIM: 2.68 X10 (3) UL (ref 1.3–6.7)
NEUTROPHIL ABS PRELIM: 2.79 X10 (3) UL (ref 1.3–6.7)
NEUTROPHIL ABS PRELIM: 2.89 X10 (3) UL (ref 1.3–6.7)
NEUTROPHIL ABS PRELIM: 3.17 X10 (3) UL (ref 1.3–6.7)
NEUTROPHIL ABS PRELIM: 3.27 X10 (3) UL (ref 1.3–6.7)
NEUTROPHIL ABS PRELIM: 3.42 X10 (3) UL (ref 1.3–6.7)
NEUTROPHIL ABS PRELIM: 3.54 X10 (3) UL (ref 1.3–6.7)
NEUTROPHIL ABS PRELIM: 3.64 X10 (3) UL (ref 1.3–6.7)
NEUTROPHIL ABS PRELIM: 3.75 X10 (3) UL (ref 1.3–6.7)
NEUTROPHIL ABS PRELIM: 3.94 X10 (3) UL (ref 1.3–6.7)
NEUTROPHIL ABS PRELIM: 4.01 X10 (3) UL (ref 1.3–6.7)
NEUTROPHIL ABS PRELIM: 4.08 X10 (3) UL (ref 1.3–6.7)
NEUTROPHIL ABS PRELIM: 4.27 X10 (3) UL (ref 1.3–6.7)
NEUTROPHIL ABS PRELIM: 4.59 X10 (3) UL (ref 1.3–6.7)
NEUTROPHIL ABS PRELIM: 4.64 X10 (3) UL (ref 1.3–6.7)
NEUTROPHIL ABS PRELIM: 4.64 X10 (3) UL (ref 1.3–6.7)
NEUTROPHIL ABS PRELIM: 4.74 X10 (3) UL (ref 1.3–6.7)
NEUTROPHIL ABS PRELIM: 4.78 X10 (3) UL (ref 1.3–6.7)
NEUTROPHIL ABS PRELIM: 4.83 X10 (3) UL (ref 1.3–6.7)
NEUTROPHIL ABS PRELIM: 4.94 X10 (3) UL (ref 1.3–6.7)
NEUTROPHIL ABS PRELIM: 4.97 X10 (3) UL (ref 1.3–6.7)
NEUTROPHIL ABS PRELIM: 5.31 X10 (3) UL (ref 1.3–6.7)
NEUTROPHIL ABS PRELIM: 5.38 X10 (3) UL (ref 1.3–6.7)
NEUTROPHIL ABS PRELIM: 5.45 X10 (3) UL (ref 1.3–6.7)
NEUTROPHIL ABS PRELIM: 5.57 X10 (3) UL (ref 1.3–6.7)
NEUTROPHIL ABS PRELIM: 5.6 X10 (3) UL (ref 1.3–6.7)
NEUTROPHIL ABS PRELIM: 5.72 X10 (3) UL (ref 1.3–6.7)
NEUTROPHIL ABS PRELIM: 6.4 X10 (3) UL (ref 1.3–6.7)
NEUTROPHIL ABS PRELIM: 6.65 X10 (3) UL (ref 1.3–6.7)
NEUTROPHIL ABS PRELIM: 7.41 X10 (3) UL (ref 1.3–6.7)
NEUTROPHIL ABS PRELIM: 7.44 X10 (3) UL (ref 1.3–6.7)
NEUTROPHIL ABS PRELIM: 7.55 X10 (3) UL (ref 1.3–6.7)
NEUTROPHIL ABS PRELIM: 8.14 X10 (3) UL (ref 1.3–6.7)
NEUTROPHIL ABS PRELIM: 9.38 X10 (3) UL (ref 1.3–6.7)
NEUTROPHILS # BLD AUTO: 1.96 X10(3) UL (ref 1.3–6.7)
NEUTROPHILS # BLD AUTO: 10.37 X10(3) UL (ref 1.3–6.7)
NEUTROPHILS # BLD AUTO: 2.22 X10(3) UL (ref 1.3–6.7)
NEUTROPHILS # BLD AUTO: 2.48 X10(3) UL (ref 1.3–6.7)
NEUTROPHILS # BLD AUTO: 2.59 X10(3) UL (ref 1.3–6.7)
NEUTROPHILS # BLD AUTO: 2.66 X10(3) UL (ref 1.3–6.7)
NEUTROPHILS # BLD AUTO: 2.68 X10(3) UL (ref 1.3–6.7)
NEUTROPHILS # BLD AUTO: 2.79 X10(3) UL (ref 1.3–6.7)
NEUTROPHILS # BLD AUTO: 2.89 X10(3) UL (ref 1.3–6.7)
NEUTROPHILS # BLD AUTO: 3.17 X10(3) UL (ref 1.3–6.7)
NEUTROPHILS # BLD AUTO: 3.27 X10(3) UL (ref 1.3–6.7)
NEUTROPHILS # BLD AUTO: 3.42 X10(3) UL (ref 1.3–6.7)
NEUTROPHILS # BLD AUTO: 3.54 X10(3) UL (ref 1.3–6.7)
NEUTROPHILS # BLD AUTO: 3.64 X10(3) UL (ref 1.3–6.7)
NEUTROPHILS # BLD AUTO: 3.75 X10(3) UL (ref 1.3–6.7)
NEUTROPHILS # BLD AUTO: 3.94 X10(3) UL (ref 1.3–6.7)
NEUTROPHILS # BLD AUTO: 4.01 X10(3) UL (ref 1.3–6.7)
NEUTROPHILS # BLD AUTO: 4.08 X10(3) UL (ref 1.3–6.7)
NEUTROPHILS # BLD AUTO: 4.27 X10(3) UL (ref 1.3–6.7)
NEUTROPHILS # BLD AUTO: 4.59 X10(3) UL (ref 1.3–6.7)
NEUTROPHILS # BLD AUTO: 4.64 X10(3) UL (ref 1.3–6.7)
NEUTROPHILS # BLD AUTO: 4.64 X10(3) UL (ref 1.3–6.7)
NEUTROPHILS # BLD AUTO: 4.74 X10(3) UL (ref 1.3–6.7)
NEUTROPHILS # BLD AUTO: 4.78 X10(3) UL (ref 1.3–6.7)
NEUTROPHILS # BLD AUTO: 4.83 X10(3) UL (ref 1.3–6.7)
NEUTROPHILS # BLD AUTO: 4.94 X10(3) UL (ref 1.3–6.7)
NEUTROPHILS # BLD AUTO: 4.97 X10(3) UL (ref 1.3–6.7)
NEUTROPHILS # BLD AUTO: 5.31 X10(3) UL (ref 1.3–6.7)
NEUTROPHILS # BLD AUTO: 5.38 X10(3) UL (ref 1.3–6.7)
NEUTROPHILS # BLD AUTO: 5.45 X10(3) UL (ref 1.3–6.7)
NEUTROPHILS # BLD AUTO: 5.57 X10(3) UL (ref 1.3–6.7)
NEUTROPHILS # BLD AUTO: 5.6 X10(3) UL (ref 1.3–6.7)
NEUTROPHILS # BLD AUTO: 5.72 X10(3) UL (ref 1.3–6.7)
NEUTROPHILS # BLD AUTO: 6.4 X10(3) UL (ref 1.3–6.7)
NEUTROPHILS # BLD AUTO: 6.65 X10(3) UL (ref 1.3–6.7)
NEUTROPHILS # BLD AUTO: 7.41 X10(3) UL (ref 1.3–6.7)
NEUTROPHILS # BLD AUTO: 7.44 X10(3) UL (ref 1.3–6.7)
NEUTROPHILS # BLD AUTO: 7.55 X10(3) UL (ref 1.3–6.7)
NEUTROPHILS # BLD AUTO: 8.14 X10(3) UL (ref 1.3–6.7)
NEUTROPHILS # BLD AUTO: 9.38 X10(3) UL (ref 1.3–6.7)
NEUTROPHILS NFR BLD AUTO: 60.2 %
NEUTROPHILS NFR BLD AUTO: 64.2 %
NEUTROPHILS NFR BLD AUTO: 65.1 %
NEUTROPHILS NFR BLD AUTO: 67.1 %
NEUTROPHILS NFR BLD AUTO: 70.4 %
NEUTROPHILS NFR BLD AUTO: 70.4 %
NEUTROPHILS NFR BLD AUTO: 71.2 %
NEUTROPHILS NFR BLD AUTO: 72.3 %
NEUTROPHILS NFR BLD AUTO: 73.1 %
NEUTROPHILS NFR BLD AUTO: 73.6 %
NEUTROPHILS NFR BLD AUTO: 73.6 %
NEUTROPHILS NFR BLD AUTO: 74.1 %
NEUTROPHILS NFR BLD AUTO: 74.3 %
NEUTROPHILS NFR BLD AUTO: 74.8 %
NEUTROPHILS NFR BLD AUTO: 75.7 %
NEUTROPHILS NFR BLD AUTO: 75.8 %
NEUTROPHILS NFR BLD AUTO: 76.1 %
NEUTROPHILS NFR BLD AUTO: 76.2 %
NEUTROPHILS NFR BLD AUTO: 77.7 %
NEUTROPHILS NFR BLD AUTO: 77.7 %
NEUTROPHILS NFR BLD AUTO: 77.8 %
NEUTROPHILS NFR BLD AUTO: 78.2 %
NEUTROPHILS NFR BLD AUTO: 78.6 %
NEUTROPHILS NFR BLD AUTO: 78.6 %
NEUTROPHILS NFR BLD AUTO: 78.8 %
NEUTROPHILS NFR BLD AUTO: 79.3 %
NEUTROPHILS NFR BLD AUTO: 79.4 %
NEUTROPHILS NFR BLD AUTO: 79.4 %
NEUTROPHILS NFR BLD AUTO: 79.5 %
NEUTROPHILS NFR BLD AUTO: 79.9 %
NEUTROPHILS NFR BLD AUTO: 80.2 %
NEUTROPHILS NFR BLD AUTO: 80.5 %
NEUTROPHILS NFR BLD AUTO: 81.1 %
NEUTROPHILS NFR BLD AUTO: 82.1 %
NEUTROPHILS NFR BLD AUTO: 82.9 %
NEUTROPHILS NFR BLD AUTO: 83.2 %
NEUTROPHILS NFR BLD AUTO: 84.7 %
NEUTROPHILS NFR BLD AUTO: 85.5 %
NEUTROPHILS NFR BLD AUTO: 85.6 %
NEUTROPHILS NFR BLD AUTO: 85.8 %
NITRITE UR QL STRIP.AUTO: NEGATIVE
NITRITE UR QL STRIP.AUTO: NEGATIVE
P AXIS: -8 DEGREES
P AXIS: 27 DEGREES
P AXIS: 33 DEGREES
P AXIS: 83 DEGREES
P-R INTERVAL: 120 MS
P-R INTERVAL: 130 MS
P-R INTERVAL: 136 MS
P-R INTERVAL: 152 MS
PH UR STRIP.AUTO: 5 [PH] (ref 4.5–8)
PH UR STRIP.AUTO: 6 [PH] (ref 4.5–8)
PLATELET # BLD AUTO: 114 10(3)UL (ref 150–450)
PLATELET # BLD AUTO: 117 10(3)UL (ref 150–450)
PLATELET # BLD AUTO: 123 10(3)UL (ref 150–450)
PLATELET # BLD AUTO: 125 10(3)UL (ref 150–450)
PLATELET # BLD AUTO: 134 10(3)UL (ref 150–450)
PLATELET # BLD AUTO: 135 10(3)UL (ref 150–450)
PLATELET # BLD AUTO: 135 10(3)UL (ref 150–450)
PLATELET # BLD AUTO: 138 10(3)UL (ref 150–450)
PLATELET # BLD AUTO: 138 10(3)UL (ref 150–450)
PLATELET # BLD AUTO: 141 10(3)UL (ref 150–450)
PLATELET # BLD AUTO: 144 10(3)UL (ref 150–450)
PLATELET # BLD AUTO: 152 10(3)UL (ref 150–450)
PLATELET # BLD AUTO: 159 10(3)UL (ref 150–450)
PLATELET # BLD AUTO: 160 10(3)UL (ref 150–450)
PLATELET # BLD AUTO: 161 10(3)UL (ref 150–450)
PLATELET # BLD AUTO: 163 10(3)UL (ref 150–450)
PLATELET # BLD AUTO: 164 10(3)UL (ref 150–450)
PLATELET # BLD AUTO: 165 10(3)UL (ref 150–450)
PLATELET # BLD AUTO: 167 10(3)UL (ref 150–450)
PLATELET # BLD AUTO: 167 10(3)UL (ref 150–450)
PLATELET # BLD AUTO: 171 10(3)UL (ref 150–450)
PLATELET # BLD AUTO: 178 10(3)UL (ref 150–450)
PLATELET # BLD AUTO: 182 10(3)UL (ref 150–450)
PLATELET # BLD AUTO: 183 10(3)UL (ref 150–450)
PLATELET # BLD AUTO: 186 10(3)UL (ref 150–450)
PLATELET # BLD AUTO: 191 10(3)UL (ref 150–450)
PLATELET # BLD AUTO: 191 10(3)UL (ref 150–450)
PLATELET # BLD AUTO: 194 10(3)UL (ref 150–450)
PLATELET # BLD AUTO: 195 10(3)UL (ref 150–450)
PLATELET # BLD AUTO: 195 10(3)UL (ref 150–450)
PLATELET # BLD AUTO: 200 10(3)UL (ref 150–450)
PLATELET # BLD AUTO: 205 10(3)UL (ref 150–450)
PLATELET # BLD AUTO: 209 10(3)UL (ref 150–450)
PLATELET # BLD AUTO: 216 10(3)UL (ref 150–450)
PLATELET # BLD AUTO: 231 10(3)UL (ref 150–450)
PLATELET # BLD AUTO: 238 10(3)UL (ref 150–450)
PLATELET # BLD AUTO: 238 10(3)UL (ref 150–450)
PLATELET # BLD AUTO: 246 10(3)UL (ref 150–450)
PLATELET # BLD AUTO: 305 10(3)UL (ref 150–450)
PLATELET # BLD AUTO: 342 10(3)UL (ref 150–450)
PLATELET MORPHOLOGY: NORMAL
POTASSIUM SERPL-SCNC: 2.9 MMOL/L (ref 3.6–5.1)
POTASSIUM SERPL-SCNC: 3 MMOL/L (ref 3.6–5.1)
POTASSIUM SERPL-SCNC: 3 MMOL/L (ref 3.6–5.1)
POTASSIUM SERPL-SCNC: 3.1 MMOL/L (ref 3.6–5.1)
POTASSIUM SERPL-SCNC: 3.4 MMOL/L (ref 3.6–5.1)
POTASSIUM SERPL-SCNC: 3.4 MMOL/L (ref 3.6–5.1)
POTASSIUM SERPL-SCNC: 3.5 MMOL/L (ref 3.6–5.1)
POTASSIUM SERPL-SCNC: 3.5 MMOL/L (ref 3.6–5.1)
POTASSIUM SERPL-SCNC: 3.6 MMOL/L (ref 3.6–5.1)
POTASSIUM SERPL-SCNC: 3.7 MMOL/L (ref 3.6–5.1)
POTASSIUM SERPL-SCNC: 3.7 MMOL/L (ref 3.6–5.1)
POTASSIUM SERPL-SCNC: 3.8 MMOL/L (ref 3.6–5.1)
POTASSIUM SERPL-SCNC: 3.9 MMOL/L (ref 3.6–5.1)
POTASSIUM SERPL-SCNC: 4 MMOL/L (ref 3.6–5.1)
POTASSIUM SERPL-SCNC: 4.1 MMOL/L (ref 3.6–5.1)
POTASSIUM SERPL-SCNC: 4.2 MMOL/L (ref 3.6–5.1)
POTASSIUM SERPL-SCNC: 4.5 MMOL/L (ref 3.6–5.1)
POTASSIUM SERPL-SCNC: 4.7 MMOL/L (ref 3.6–5.1)
POTASSIUM SERPL-SCNC: 4.7 MMOL/L (ref 3.6–5.1)
POTASSIUM SERPL-SCNC: 4.8 MMOL/L (ref 3.6–5.1)
POTASSIUM SERPL-SCNC: 5.2 MMOL/L (ref 3.6–5.1)
PROT UR STRIP.AUTO-MCNC: NEGATIVE MG/DL
PROT UR STRIP.AUTO-MCNC: NEGATIVE MG/DL
PSA SERPL DL<=0.01 NG/ML-MCNC: 13.5 SECONDS (ref 12–14.3)
PSA SERPL DL<=0.01 NG/ML-MCNC: 16.2 SECONDS (ref 12–14.3)
Q-T INTERVAL: 380 MS
Q-T INTERVAL: 394 MS
Q-T INTERVAL: 398 MS
Q-T INTERVAL: 422 MS
QRS DURATION: 84 MS
QRS DURATION: 88 MS
QRS DURATION: 92 MS
QRS DURATION: 94 MS
QTC CALCULATION (BEZET): 435 MS
QTC CALCULATION (BEZET): 443 MS
QTC CALCULATION (BEZET): 457 MS
QTC CALCULATION (BEZET): 473 MS
R AXIS: -21 DEGREES
R AXIS: -26 DEGREES
R AXIS: -6 DEGREES
R AXIS: 3 DEGREES
RBC # BLD AUTO: 2.45 X10(6)UL (ref 3.8–5.8)
RBC # BLD AUTO: 2.6 X10(6)UL (ref 3.8–5.8)
RBC # BLD AUTO: 2.68 X10(6)UL (ref 3.8–5.8)
RBC # BLD AUTO: 2.75 X10(6)UL (ref 3.8–5.8)
RBC # BLD AUTO: 2.76 X10(6)UL (ref 3.8–5.8)
RBC # BLD AUTO: 2.86 X10(6)UL (ref 3.8–5.8)
RBC # BLD AUTO: 2.88 X10(6)UL (ref 3.8–5.8)
RBC # BLD AUTO: 2.99 X10(6)UL (ref 3.8–5.8)
RBC # BLD AUTO: 3.01 X10(6)UL (ref 3.8–5.8)
RBC # BLD AUTO: 3.02 X10(6)UL (ref 3.8–5.8)
RBC # BLD AUTO: 3.03 X10(6)UL (ref 3.8–5.8)
RBC # BLD AUTO: 3.18 X10(6)UL (ref 3.8–5.8)
RBC # BLD AUTO: 3.19 X10(6)UL (ref 3.8–5.8)
RBC # BLD AUTO: 3.19 X10(6)UL (ref 3.8–5.8)
RBC # BLD AUTO: 3.22 X10(6)UL (ref 3.8–5.8)
RBC # BLD AUTO: 3.23 X10(6)UL (ref 3.8–5.8)
RBC # BLD AUTO: 3.25 X10(6)UL (ref 3.8–5.8)
RBC # BLD AUTO: 3.32 X10(6)UL (ref 3.8–5.8)
RBC # BLD AUTO: 3.34 X10(6)UL (ref 3.8–5.8)
RBC # BLD AUTO: 3.43 X10(6)UL (ref 3.8–5.8)
RBC # BLD AUTO: 3.47 X10(6)UL (ref 3.8–5.8)
RBC # BLD AUTO: 3.51 X10(6)UL (ref 3.8–5.8)
RBC # BLD AUTO: 3.51 X10(6)UL (ref 3.8–5.8)
RBC # BLD AUTO: 3.54 X10(6)UL (ref 3.8–5.8)
RBC # BLD AUTO: 3.55 X10(6)UL (ref 3.8–5.8)
RBC # BLD AUTO: 3.57 X10(6)UL (ref 3.8–5.8)
RBC # BLD AUTO: 3.6 X10(6)UL (ref 3.8–5.8)
RBC # BLD AUTO: 3.61 X10(6)UL (ref 3.8–5.8)
RBC # BLD AUTO: 3.63 X10(6)UL (ref 3.8–5.8)
RBC # BLD AUTO: 3.67 X10(6)UL (ref 3.8–5.8)
RBC # BLD AUTO: 3.72 X10(6)UL (ref 3.8–5.8)
RBC # BLD AUTO: 3.73 X10(6)UL (ref 3.8–5.8)
RBC # BLD AUTO: 3.76 X10(6)UL (ref 3.8–5.8)
RBC # BLD AUTO: 3.76 X10(6)UL (ref 3.8–5.8)
RBC # BLD AUTO: 3.79 X10(6)UL (ref 3.8–5.8)
RBC # BLD AUTO: 3.9 X10(6)UL (ref 3.8–5.8)
RBC # BLD AUTO: 3.92 X10(6)UL (ref 3.8–5.8)
RBC # BLD AUTO: 4.02 X10(6)UL (ref 3.8–5.8)
RBC # BLD AUTO: 4.06 X10(6)UL (ref 3.8–5.8)
RBC # BLD AUTO: 4.26 X10(6)UL (ref 3.8–5.8)
RBC UR QL AUTO: NEGATIVE
RED CELL DISTRIBUTION WIDTH-SD: 45.7 FL (ref 35.1–46.3)
RED CELL DISTRIBUTION WIDTH-SD: 46.7 FL (ref 35.1–46.3)
RED CELL DISTRIBUTION WIDTH-SD: 47.4 FL (ref 35.1–46.3)
RED CELL DISTRIBUTION WIDTH-SD: 47.9 FL (ref 35.1–46.3)
RED CELL DISTRIBUTION WIDTH-SD: 49 FL (ref 35.1–46.3)
RED CELL DISTRIBUTION WIDTH-SD: 50.4 FL (ref 35.1–46.3)
RED CELL DISTRIBUTION WIDTH-SD: 50.4 FL (ref 35.1–46.3)
RED CELL DISTRIBUTION WIDTH-SD: 51.4 FL (ref 35.1–46.3)
RED CELL DISTRIBUTION WIDTH-SD: 51.8 FL (ref 35.1–46.3)
RED CELL DISTRIBUTION WIDTH-SD: 51.8 FL (ref 35.1–46.3)
RED CELL DISTRIBUTION WIDTH-SD: 52.2 FL (ref 35.1–46.3)
RED CELL DISTRIBUTION WIDTH-SD: 53.1 FL (ref 35.1–46.3)
RED CELL DISTRIBUTION WIDTH-SD: 53.5 FL (ref 35.1–46.3)
RED CELL DISTRIBUTION WIDTH-SD: 54.3 FL (ref 35.1–46.3)
RED CELL DISTRIBUTION WIDTH-SD: 54.4 FL (ref 35.1–46.3)
RED CELL DISTRIBUTION WIDTH-SD: 54.6 FL (ref 35.1–46.3)
RED CELL DISTRIBUTION WIDTH-SD: 54.8 FL (ref 35.1–46.3)
RED CELL DISTRIBUTION WIDTH-SD: 55.4 FL (ref 35.1–46.3)
RED CELL DISTRIBUTION WIDTH-SD: 55.8 FL (ref 35.1–46.3)
RED CELL DISTRIBUTION WIDTH-SD: 55.9 FL (ref 35.1–46.3)
RED CELL DISTRIBUTION WIDTH-SD: 56.2 FL (ref 35.1–46.3)
RED CELL DISTRIBUTION WIDTH-SD: 56.2 FL (ref 35.1–46.3)
RED CELL DISTRIBUTION WIDTH-SD: 56.5 FL (ref 35.1–46.3)
RED CELL DISTRIBUTION WIDTH-SD: 56.6 FL (ref 35.1–46.3)
RED CELL DISTRIBUTION WIDTH-SD: 56.9 FL (ref 35.1–46.3)
RED CELL DISTRIBUTION WIDTH-SD: 56.9 FL (ref 35.1–46.3)
RED CELL DISTRIBUTION WIDTH-SD: 57.1 FL (ref 35.1–46.3)
RED CELL DISTRIBUTION WIDTH-SD: 57.5 FL (ref 35.1–46.3)
RED CELL DISTRIBUTION WIDTH-SD: 57.6 FL (ref 35.1–46.3)
RED CELL DISTRIBUTION WIDTH-SD: 58.4 FL (ref 35.1–46.3)
RED CELL DISTRIBUTION WIDTH-SD: 59.1 FL (ref 35.1–46.3)
RED CELL DISTRIBUTION WIDTH-SD: 60.2 FL (ref 35.1–46.3)
RED CELL DISTRIBUTION WIDTH-SD: 60.3 FL (ref 35.1–46.3)
RED CELL DISTRIBUTION WIDTH-SD: 60.5 FL (ref 35.1–46.3)
RED CELL DISTRIBUTION WIDTH-SD: 60.7 FL (ref 35.1–46.3)
RED CELL DISTRIBUTION WIDTH-SD: 61 FL (ref 35.1–46.3)
RED CELL DISTRIBUTION WIDTH-SD: 61.9 FL (ref 35.1–46.3)
RED CELL DISTRIBUTION WIDTH-SD: 61.9 FL (ref 35.1–46.3)
RED CELL DISTRIBUTION WIDTH-SD: 62 FL (ref 35.1–46.3)
RED CELL DISTRIBUTION WIDTH-SD: 63 FL (ref 35.1–46.3)
RETIC ABS CALC AUTO: 11.5 X10(3) UL (ref 22.5–147.5)
RETIC IRF CALC: 0.15 RATIO (ref 0.09–0.3)
RETIC%: 0.5 % (ref 0.5–2.5)
RETICULOCYTE HEMOGLOBIN EQUIVALENT: 25.5 PG (ref 28.2–36.3)
RGTSCRN: 2
RH BLOOD TYPE: POSITIVE
RH BLOOD TYPE: POSITIVE
SODIUM SERPL-SCNC: 137 MMOL/L (ref 136–144)
SODIUM SERPL-SCNC: 139 MMOL/L (ref 136–144)
SODIUM SERPL-SCNC: 139 MMOL/L (ref 136–144)
SODIUM SERPL-SCNC: 140 MMOL/L (ref 136–144)
SODIUM SERPL-SCNC: 141 MMOL/L (ref 136–144)
SODIUM SERPL-SCNC: 142 MMOL/L (ref 136–144)
SODIUM SERPL-SCNC: 143 MMOL/L (ref 136–144)
SODIUM SERPL-SCNC: 144 MMOL/L (ref 136–144)
SODIUM SERPL-SCNC: 144 MMOL/L (ref 136–144)
SODIUM SERPL-SCNC: 145 MMOL/L (ref 136–144)
SODIUM SERPL-SCNC: 146 MMOL/L (ref 136–144)
SODIUM SERPL-SCNC: 147 MMOL/L (ref 136–144)
SODIUM SERPL-SCNC: 147 MMOL/L (ref 136–144)
SODIUM SERPL-SCNC: 150 MMOL/L (ref 136–144)
SP GR UR STRIP.AUTO: 1.01 (ref 1–1.03)
SP GR UR STRIP.AUTO: 1.05 (ref 1–1.03)
T AXIS: 30 DEGREES
T AXIS: 32 DEGREES
T AXIS: 39 DEGREES
T AXIS: 43 DEGREES
TOTAL IRON BINDING CAPACITY: 229 UG/DL (ref 298–536)
TOTAL PROTEIN, URINE: 1208 MG/D
TOTAL PROTEIN,SERUM: 4.6 G/DL (ref 6.1–8.3)
TOTAL PROTEIN,SERUM: 5.5 G/DL (ref 6.1–8.3)
TOTAL PROTEIN,SERUM: 5.6 G/DL (ref 6.1–8.3)
TOTAL PROTEIN,SERUM: 5.6 G/DL (ref 6.1–8.3)
TOTAL PROTEIN,SERUM: 6.1 G/DL (ref 6.1–8.3)
TOTAL PROTEIN,SERUM: 6.1 G/DL (ref 6.1–8.3)
TOTAL PROTEIN,SERUM: 6.4 G/DL (ref 6.1–8.3)
TOTAL VOLUME: 600 ML
TRANSFERRIN: 154 MG/DL (ref 200–360)
TREATCELL: 1
TROPONIN: <0.046 NG/ML (ref ?–0.05)
URIC ACID: 7.6 MG/DL (ref 2.4–8.7)
URINE BETA GLOBULIN: DETECTED
UROBILINOGEN UR STRIP.AUTO-MCNC: <2 MG/DL
UROBILINOGEN UR STRIP.AUTO-MCNC: <2 MG/DL
VENTRICULAR RATE: 64 BPM
VENTRICULAR RATE: 76 BPM
VENTRICULAR RATE: 85 BPM
VENTRICULAR RATE: 87 BPM
VISCOSITY, SERUM: 1.35 CP
WBC # BLD AUTO: 11.8 X10(3) UL (ref 4–13)
WBC # BLD AUTO: 13.4 X10(3) UL (ref 4–13)
WBC # BLD AUTO: 2.7 X10(3) UL (ref 4–13)
WBC # BLD AUTO: 3 X10(3) UL (ref 4–13)
WBC # BLD AUTO: 3.1 X10(3) UL (ref 4–13)
WBC # BLD AUTO: 3.4 X10(3) UL (ref 4–13)
WBC # BLD AUTO: 3.4 X10(3) UL (ref 4–13)
WBC # BLD AUTO: 3.6 X10(3) UL (ref 4–13)
WBC # BLD AUTO: 3.8 X10(3) UL (ref 4–13)
WBC # BLD AUTO: 4.3 X10(3) UL (ref 4–13)
WBC # BLD AUTO: 4.4 X10(3) UL (ref 4–13)
WBC # BLD AUTO: 4.7 X10(3) UL (ref 4–13)
WBC # BLD AUTO: 4.8 X10(3) UL (ref 4–13)
WBC # BLD AUTO: 4.8 X10(3) UL (ref 4–13)
WBC # BLD AUTO: 4.9 X10(3) UL (ref 4–13)
WBC # BLD AUTO: 5 X10(3) UL (ref 4–13)
WBC # BLD AUTO: 5 X10(3) UL (ref 4–13)
WBC # BLD AUTO: 5.3 X10(3) UL (ref 4–13)
WBC # BLD AUTO: 5.8 X10(3) UL (ref 4–13)
WBC # BLD AUTO: 5.8 X10(3) UL (ref 4–13)
WBC # BLD AUTO: 5.9 X10(3) UL (ref 4–13)
WBC # BLD AUTO: 6 X10(3) UL (ref 4–13)
WBC # BLD AUTO: 6 X10(3) UL (ref 4–13)
WBC # BLD AUTO: 6.2 X10(3) UL (ref 4–13)
WBC # BLD AUTO: 6.3 X10(3) UL (ref 4–13)
WBC # BLD AUTO: 6.4 X10(3) UL (ref 4–13)
WBC # BLD AUTO: 6.6 X10(3) UL (ref 4–13)
WBC # BLD AUTO: 6.7 X10(3) UL (ref 4–13)
WBC # BLD AUTO: 6.8 X10(3) UL (ref 4–13)
WBC # BLD AUTO: 6.9 X10(3) UL (ref 4–13)
WBC # BLD AUTO: 7.3 X10(3) UL (ref 4–13)
WBC # BLD AUTO: 7.7 X10(3) UL (ref 4–13)
WBC # BLD AUTO: 7.7 X10(3) UL (ref 4–13)
WBC # BLD AUTO: 8.2 X10(3) UL (ref 4–13)
WBC # BLD AUTO: 8.7 X10(3) UL (ref 4–13)
WBC # BLD AUTO: 9.4 X10(3) UL (ref 4–13)
WBC # BLD AUTO: 9.5 X10(3) UL (ref 4–13)
WBC # BLD AUTO: 9.5 X10(3) UL (ref 4–13)

## 2017-01-01 PROCEDURE — 96361 HYDRATE IV INFUSION ADD-ON: CPT

## 2017-01-01 PROCEDURE — 99285 EMERGENCY DEPT VISIT HI MDM: CPT

## 2017-01-01 PROCEDURE — BR30Y0Z MAGNETIC RESONANCE IMAGING (MRI) OF CERVICAL SPINE USING OTHER CONTRAST, UNENHANCED AND ENHANCED: ICD-10-PCS | Performed by: RADIOLOGY

## 2017-01-01 PROCEDURE — 96413 CHEMO IV INFUSION 1 HR: CPT

## 2017-01-01 PROCEDURE — 99214 OFFICE O/P EST MOD 30 MIN: CPT | Performed by: INTERNAL MEDICINE

## 2017-01-01 PROCEDURE — 99223 1ST HOSP IP/OBS HIGH 75: CPT | Performed by: OTHER

## 2017-01-01 PROCEDURE — 96374 THER/PROPH/DIAG INJ IV PUSH: CPT

## 2017-01-01 PROCEDURE — 99215 OFFICE O/P EST HI 40 MIN: CPT | Performed by: INTERNAL MEDICINE

## 2017-01-01 PROCEDURE — 80053 COMPREHEN METABOLIC PANEL: CPT

## 2017-01-01 PROCEDURE — 77334 RADIATION TREATMENT AID(S): CPT | Performed by: RADIOLOGY

## 2017-01-01 PROCEDURE — 77387 GUIDANCE FOR RADJ TX DLVR: CPT | Performed by: RADIOLOGY

## 2017-01-01 PROCEDURE — 76937 US GUIDE VASCULAR ACCESS: CPT

## 2017-01-01 PROCEDURE — 99284 EMERGENCY DEPT VISIT MOD MDM: CPT

## 2017-01-01 PROCEDURE — 77417 THER RADIOLOGY PORT IMAGE(S): CPT | Performed by: RADIOLOGY

## 2017-01-01 PROCEDURE — 77399 UNLISTED PX MED RADJ PHYSICS: CPT | Performed by: RADIOLOGY

## 2017-01-01 PROCEDURE — 85025 COMPLETE CBC W/AUTO DIFF WBC: CPT

## 2017-01-01 PROCEDURE — 0DB68ZX EXCISION OF STOMACH, VIA NATURAL OR ARTIFICIAL OPENING ENDOSCOPIC, DIAGNOSTIC: ICD-10-PCS | Performed by: INTERNAL MEDICINE

## 2017-01-01 PROCEDURE — 86334 IMMUNOFIX E-PHORESIS SERUM: CPT | Performed by: INTERNAL MEDICINE

## 2017-01-01 PROCEDURE — 99233 SBSQ HOSP IP/OBS HIGH 50: CPT | Performed by: HOSPITALIST

## 2017-01-01 PROCEDURE — 96409 CHEMO IV PUSH SNGL DRUG: CPT

## 2017-01-01 PROCEDURE — 99232 SBSQ HOSP IP/OBS MODERATE 35: CPT | Performed by: HOSPITALIST

## 2017-01-01 PROCEDURE — 99225 SUBSEQUENT OBSERVATION CARE: CPT | Performed by: HOSPITALIST

## 2017-01-01 PROCEDURE — 93306 TTE W/DOPPLER COMPLETE: CPT | Performed by: INTERNAL MEDICINE

## 2017-01-01 PROCEDURE — S0028 INJECTION, FAMOTIDINE, 20 MG: HCPCS | Performed by: INTERNAL MEDICINE

## 2017-01-01 PROCEDURE — 96401 CHEMO ANTI-NEOPL SQ/IM: CPT

## 2017-01-01 PROCEDURE — 77412 RADIATION TX DELIVERY LVL 3: CPT | Performed by: RADIOLOGY

## 2017-01-01 PROCEDURE — 86900 BLOOD TYPING SEROLOGIC ABO: CPT

## 2017-01-01 PROCEDURE — 99497 ADVNCD CARE PLAN 30 MIN: CPT | Performed by: NURSE PRACTITIONER

## 2017-01-01 PROCEDURE — 84484 ASSAY OF TROPONIN QUANT: CPT | Performed by: EMERGENCY MEDICINE

## 2017-01-01 PROCEDURE — 99215 OFFICE O/P EST HI 40 MIN: CPT | Performed by: NURSE PRACTITIONER

## 2017-01-01 PROCEDURE — 96375 TX/PRO/DX INJ NEW DRUG ADDON: CPT

## 2017-01-01 PROCEDURE — 0DBH8ZX EXCISION OF CECUM, VIA NATURAL OR ARTIFICIAL OPENING ENDOSCOPIC, DIAGNOSTIC: ICD-10-PCS | Performed by: INTERNAL MEDICINE

## 2017-01-01 PROCEDURE — 99232 SBSQ HOSP IP/OBS MODERATE 35: CPT | Performed by: INTERNAL MEDICINE

## 2017-01-01 PROCEDURE — 99232 SBSQ HOSP IP/OBS MODERATE 35: CPT | Performed by: SPECIALIST

## 2017-01-01 PROCEDURE — 83883 ASSAY NEPHELOMETRY NOT SPEC: CPT

## 2017-01-01 PROCEDURE — 74000 XR ABDOMEN (1 VIEW) (CPT=74000): CPT | Performed by: SPECIALIST

## 2017-01-01 PROCEDURE — 80053 COMPREHEN METABOLIC PANEL: CPT | Performed by: EMERGENCY MEDICINE

## 2017-01-01 PROCEDURE — 86334 IMMUNOFIX E-PHORESIS SERUM: CPT

## 2017-01-01 PROCEDURE — 99214 OFFICE O/P EST MOD 30 MIN: CPT | Performed by: SPECIALIST

## 2017-01-01 PROCEDURE — 99239 HOSP IP/OBS DSCHRG MGMT >30: CPT | Performed by: HOSPITALIST

## 2017-01-01 PROCEDURE — 99232 SBSQ HOSP IP/OBS MODERATE 35: CPT | Performed by: OTHER

## 2017-01-01 PROCEDURE — 99214 OFFICE O/P EST MOD 30 MIN: CPT | Performed by: NURSE PRACTITIONER

## 2017-01-01 PROCEDURE — 96415 CHEMO IV INFUSION ADDL HR: CPT

## 2017-01-01 PROCEDURE — 99223 1ST HOSP IP/OBS HIGH 75: CPT | Performed by: INTERNAL MEDICINE

## 2017-01-01 PROCEDURE — 72020 X-RAY EXAM OF SPINE 1 VIEW: CPT | Performed by: NEUROLOGICAL SURGERY

## 2017-01-01 PROCEDURE — 72040 X-RAY EXAM NECK SPINE 2-3 VW: CPT | Performed by: NEUROLOGICAL SURGERY

## 2017-01-01 PROCEDURE — 96417 CHEMO IV INFUS EACH ADDL SEQ: CPT

## 2017-01-01 PROCEDURE — 99217 OBSERVATION CARE DISCHARGE: CPT | Performed by: HOSPITALIST

## 2017-01-01 PROCEDURE — 78816 PET IMAGE W/CT FULL BODY: CPT

## 2017-01-01 PROCEDURE — 71101 X-RAY EXAM UNILAT RIBS/CHEST: CPT

## 2017-01-01 PROCEDURE — 96376 TX/PRO/DX INJ SAME DRUG ADON: CPT

## 2017-01-01 PROCEDURE — 78816 PET IMAGE W/CT FULL BODY: CPT | Performed by: INTERNAL MEDICINE

## 2017-01-01 PROCEDURE — 99213 OFFICE O/P EST LOW 20 MIN: CPT | Performed by: NURSE PRACTITIONER

## 2017-01-01 PROCEDURE — 77300 RADIATION THERAPY DOSE PLAN: CPT | Performed by: RADIOLOGY

## 2017-01-01 PROCEDURE — 84165 PROTEIN E-PHORESIS SERUM: CPT

## 2017-01-01 PROCEDURE — 77280 THER RAD SIMULAJ FIELD SMPL: CPT | Performed by: RADIOLOGY

## 2017-01-01 PROCEDURE — 77336 RADIATION PHYSICS CONSULT: CPT | Performed by: RADIOLOGY

## 2017-01-01 PROCEDURE — 30233R1 TRANSFUSION OF NONAUTOLOGOUS PLATELETS INTO PERIPHERAL VEIN, PERCUTANEOUS APPROACH: ICD-10-PCS | Performed by: HOSPITALIST

## 2017-01-01 PROCEDURE — S0028 INJECTION, FAMOTIDINE, 20 MG: HCPCS | Performed by: NURSE PRACTITIONER

## 2017-01-01 PROCEDURE — 71010 XR CHEST AP PORTABLE  (CPT=71010): CPT | Performed by: HOSPITALIST

## 2017-01-01 PROCEDURE — BR37Y0Z MAGNETIC RESONANCE IMAGING (MRI) OF THORACIC SPINE USING OTHER CONTRAST, UNENHANCED AND ENHANCED: ICD-10-PCS | Performed by: RADIOLOGY

## 2017-01-01 PROCEDURE — 83690 ASSAY OF LIPASE: CPT | Performed by: EMERGENCY MEDICINE

## 2017-01-01 PROCEDURE — 81001 URINALYSIS AUTO W/SCOPE: CPT | Performed by: EMERGENCY MEDICINE

## 2017-01-01 PROCEDURE — 71275 CT ANGIOGRAPHY CHEST: CPT

## 2017-01-01 PROCEDURE — 85025 COMPLETE CBC W/AUTO DIFF WBC: CPT | Performed by: EMERGENCY MEDICINE

## 2017-01-01 PROCEDURE — 96360 HYDRATION IV INFUSION INIT: CPT

## 2017-01-01 PROCEDURE — 82565 ASSAY OF CREATININE: CPT

## 2017-01-01 PROCEDURE — 96402 CHEMO HORMON ANTINEOPL SQ/IM: CPT

## 2017-01-01 PROCEDURE — 93005 ELECTROCARDIOGRAM TRACING: CPT

## 2017-01-01 PROCEDURE — 80048 BASIC METABOLIC PNL TOTAL CA: CPT

## 2017-01-01 PROCEDURE — 02HV33Z INSERTION OF INFUSION DEVICE INTO SUPERIOR VENA CAVA, PERCUTANEOUS APPROACH: ICD-10-PCS | Performed by: RADIOLOGY

## 2017-01-01 PROCEDURE — 74177 CT ABD & PELVIS W/CONTRAST: CPT | Performed by: EMERGENCY MEDICINE

## 2017-01-01 PROCEDURE — 99221 1ST HOSP IP/OBS SF/LOW 40: CPT | Performed by: NURSE PRACTITIONER

## 2017-01-01 PROCEDURE — 83615 LACTATE (LD) (LDH) ENZYME: CPT | Performed by: INTERNAL MEDICINE

## 2017-01-01 PROCEDURE — 99233 SBSQ HOSP IP/OBS HIGH 50: CPT | Performed by: INTERNAL MEDICINE

## 2017-01-01 PROCEDURE — 0DB98ZX EXCISION OF DUODENUM, VIA NATURAL OR ARTIFICIAL OPENING ENDOSCOPIC, DIAGNOSTIC: ICD-10-PCS | Performed by: INTERNAL MEDICINE

## 2017-01-01 PROCEDURE — 82962 GLUCOSE BLOOD TEST: CPT

## 2017-01-01 PROCEDURE — 99239 HOSP IP/OBS DSCHRG MGMT >30: CPT | Performed by: INTERNAL MEDICINE

## 2017-01-01 PROCEDURE — 84156 ASSAY OF PROTEIN URINE: CPT

## 2017-01-01 PROCEDURE — 36591 DRAW BLOOD OFF VENOUS DEVICE: CPT

## 2017-01-01 PROCEDURE — 77470 SPECIAL RADIATION TREATMENT: CPT | Performed by: RADIOLOGY

## 2017-01-01 PROCEDURE — 86901 BLOOD TYPING SEROLOGIC RH(D): CPT

## 2017-01-01 PROCEDURE — 99220 INITIAL OBSERVATION CARE,LEVL III: CPT | Performed by: HOSPITALIST

## 2017-01-01 PROCEDURE — 84165 PROTEIN E-PHORESIS SERUM: CPT | Performed by: INTERNAL MEDICINE

## 2017-01-01 PROCEDURE — 0JH60WZ INSERTION OF TOTALLY IMPLANTABLE VASCULAR ACCESS DEVICE INTO CHEST SUBCUTANEOUS TISSUE AND FASCIA, OPEN APPROACH: ICD-10-PCS | Performed by: RADIOLOGY

## 2017-01-01 PROCEDURE — 83883 ASSAY NEPHELOMETRY NOT SPEC: CPT | Performed by: INTERNAL MEDICINE

## 2017-01-01 PROCEDURE — 72110 X-RAY EXAM L-2 SPINE 4/>VWS: CPT | Performed by: EMERGENCY MEDICINE

## 2017-01-01 PROCEDURE — 72158 MRI LUMBAR SPINE W/O & W/DYE: CPT

## 2017-01-01 PROCEDURE — 77307 TELETHX ISODOSE PLAN CPLX: CPT | Performed by: RADIOLOGY

## 2017-01-01 PROCEDURE — 71101 X-RAY EXAM UNILAT RIBS/CHEST: CPT | Performed by: EMERGENCY MEDICINE

## 2017-01-01 PROCEDURE — 93010 ELECTROCARDIOGRAM REPORT: CPT | Performed by: INTERNAL MEDICINE

## 2017-01-01 PROCEDURE — 82140 ASSAY OF AMMONIA: CPT

## 2017-01-01 PROCEDURE — 77331 SPECIAL RADIATION DOSIMETRY: CPT | Performed by: RADIOLOGY

## 2017-01-01 PROCEDURE — 82784 ASSAY IGA/IGD/IGG/IGM EACH: CPT | Performed by: INTERNAL MEDICINE

## 2017-01-01 PROCEDURE — 80048 BASIC METABOLIC PNL TOTAL CA: CPT | Performed by: FAMILY MEDICINE

## 2017-01-01 PROCEDURE — 71010 XR CHEST AP PORTABLE  (CPT=71010): CPT | Performed by: OTHER

## 2017-01-01 PROCEDURE — 71010 XR CHEST AP PORTABLE  (CPT=71010): CPT | Performed by: NURSE PRACTITIONER

## 2017-01-01 PROCEDURE — 36561 INSERT TUNNELED CV CATH: CPT

## 2017-01-01 PROCEDURE — 99223 1ST HOSP IP/OBS HIGH 75: CPT | Performed by: HOSPITALIST

## 2017-01-01 PROCEDURE — 99495 TRANSJ CARE MGMT MOD F2F 14D: CPT | Performed by: FAMILY MEDICINE

## 2017-01-01 PROCEDURE — 82784 ASSAY IGA/IGD/IGG/IGM EACH: CPT

## 2017-01-01 PROCEDURE — 93010 ELECTROCARDIOGRAM REPORT: CPT

## 2017-01-01 PROCEDURE — 00BW0ZZ EXCISION OF CERVICAL SPINAL CORD, OPEN APPROACH: ICD-10-PCS | Performed by: NEUROLOGICAL SURGERY

## 2017-01-01 PROCEDURE — 85610 PROTHROMBIN TIME: CPT

## 2017-01-01 PROCEDURE — 96365 THER/PROPH/DIAG IV INF INIT: CPT

## 2017-01-01 PROCEDURE — 36415 COLL VENOUS BLD VENIPUNCTURE: CPT

## 2017-01-01 PROCEDURE — 83615 LACTATE (LD) (LDH) ENZYME: CPT

## 2017-01-01 PROCEDURE — 77295 3-D RADIOTHERAPY PLAN: CPT | Performed by: RADIOLOGY

## 2017-01-01 PROCEDURE — 99498 ADVNCD CARE PLAN ADDL 30 MIN: CPT | Performed by: NURSE PRACTITIONER

## 2017-01-01 PROCEDURE — 76770 US EXAM ABDO BACK WALL COMP: CPT | Performed by: INTERNAL MEDICINE

## 2017-01-01 PROCEDURE — 99214 OFFICE O/P EST MOD 30 MIN: CPT

## 2017-01-01 PROCEDURE — 82150 ASSAY OF AMYLASE: CPT

## 2017-01-01 PROCEDURE — 77332 RADIATION TREATMENT AID(S): CPT | Performed by: RADIOLOGY

## 2017-01-01 PROCEDURE — 73030 X-RAY EXAM OF SHOULDER: CPT

## 2017-01-01 PROCEDURE — 82310 ASSAY OF CALCIUM: CPT

## 2017-01-01 PROCEDURE — 30233N1 TRANSFUSION OF NONAUTOLOGOUS RED BLOOD CELLS INTO PERIPHERAL VEIN, PERCUTANEOUS APPROACH: ICD-10-PCS | Performed by: HOSPITALIST

## 2017-01-01 PROCEDURE — 99291 CRITICAL CARE FIRST HOUR: CPT | Performed by: OTHER

## 2017-01-01 PROCEDURE — 86850 RBC ANTIBODY SCREEN: CPT

## 2017-01-01 PROCEDURE — 01N10ZZ RELEASE CERVICAL NERVE, OPEN APPROACH: ICD-10-PCS | Performed by: NEUROLOGICAL SURGERY

## 2017-01-01 PROCEDURE — 76700 US EXAM ABDOM COMPLETE: CPT | Performed by: EMERGENCY MEDICINE

## 2017-01-01 PROCEDURE — 72156 MRI NECK SPINE W/O & W/DYE: CPT | Performed by: OTHER

## 2017-01-01 PROCEDURE — 85025 COMPLETE CBC W/AUTO DIFF WBC: CPT | Performed by: HOSPITALIST

## 2017-01-01 PROCEDURE — 99238 HOSP IP/OBS DSCHRG MGMT 30/<: CPT | Performed by: HOSPITALIST

## 2017-01-01 PROCEDURE — 99215 OFFICE O/P EST HI 40 MIN: CPT | Performed by: CLINICAL NURSE SPECIALIST

## 2017-01-01 PROCEDURE — 99211 OFF/OP EST MAY X REQ PHY/QHP: CPT

## 2017-01-01 PROCEDURE — 77290 THER RAD SIMULAJ FIELD CPLX: CPT | Performed by: RADIOLOGY

## 2017-01-01 PROCEDURE — A9575 INJ GADOTERATE MEGLUMI 0.1ML: HCPCS | Performed by: INTERNAL MEDICINE

## 2017-01-01 PROCEDURE — 83690 ASSAY OF LIPASE: CPT

## 2017-01-01 PROCEDURE — 0RG20AJ FUSION OF 2 OR MORE CERVICAL VERTEBRAL JOINTS WITH INTERBODY FUSION DEVICE, POSTERIOR APPROACH, ANTERIOR COLUMN, OPEN APPROACH: ICD-10-PCS | Performed by: NEUROLOGICAL SURGERY

## 2017-01-01 PROCEDURE — 72125 CT NECK SPINE W/O DYE: CPT | Performed by: NURSE PRACTITIONER

## 2017-01-01 PROCEDURE — 5A09457 ASSISTANCE WITH RESPIRATORY VENTILATION, 24-96 CONSECUTIVE HOURS, CONTINUOUS POSITIVE AIRWAY PRESSURE: ICD-10-PCS | Performed by: HOSPITALIST

## 2017-01-01 PROCEDURE — 71010 XR CHEST AP PORTABLE  (CPT=71010): CPT | Performed by: EMERGENCY MEDICINE

## 2017-01-01 PROCEDURE — 99233 SBSQ HOSP IP/OBS HIGH 50: CPT | Performed by: SPECIALIST

## 2017-01-01 PROCEDURE — 74230 X-RAY XM SWLNG FUNCJ C+: CPT | Performed by: HOSPITALIST

## 2017-01-01 PROCEDURE — 99222 1ST HOSP IP/OBS MODERATE 55: CPT | Performed by: INTERNAL MEDICINE

## 2017-01-01 PROCEDURE — 85730 THROMBOPLASTIN TIME PARTIAL: CPT | Performed by: EMERGENCY MEDICINE

## 2017-01-01 PROCEDURE — 82040 ASSAY OF SERUM ALBUMIN: CPT

## 2017-01-01 PROCEDURE — 77001 FLUOROGUIDE FOR VEIN DEVICE: CPT

## 2017-01-01 PROCEDURE — 72127 CT NECK SPINE W/O & W/DYE: CPT | Performed by: OTHER

## 2017-01-01 PROCEDURE — 99152 MOD SED SAME PHYS/QHP 5/>YRS: CPT

## 2017-01-01 PROCEDURE — 70450 CT HEAD/BRAIN W/O DYE: CPT | Performed by: NURSE PRACTITIONER

## 2017-01-01 PROCEDURE — 71010 XR CHEST AP PORTABLE  (CPT=71010): CPT | Performed by: NEUROLOGICAL SURGERY

## 2017-01-01 PROCEDURE — 72148 MRI LUMBAR SPINE W/O DYE: CPT | Performed by: PHYSICIAN ASSISTANT

## 2017-01-01 PROCEDURE — 80048 BASIC METABOLIC PNL TOTAL CA: CPT | Performed by: INTERNAL MEDICINE

## 2017-01-01 PROCEDURE — 95816 EEG AWAKE AND DROWSY: CPT | Performed by: OTHER

## 2017-01-01 PROCEDURE — 72157 MRI CHEST SPINE W/O & W/DYE: CPT | Performed by: OTHER

## 2017-01-01 PROCEDURE — 70450 CT HEAD/BRAIN W/O DYE: CPT | Performed by: OTHER

## 2017-01-01 PROCEDURE — 74020 XR ABDOMEN, OBSTRUCTIVE SERIES (CPT=74020): CPT | Performed by: EMERGENCY MEDICINE

## 2017-01-01 PROCEDURE — 99231 SBSQ HOSP IP/OBS SF/LOW 25: CPT | Performed by: NURSE PRACTITIONER

## 2017-01-01 PROCEDURE — 82232 ASSAY OF BETA-2 PROTEIN: CPT

## 2017-01-01 PROCEDURE — 70450 CT HEAD/BRAIN W/O DYE: CPT | Performed by: EMERGENCY MEDICINE

## 2017-01-01 PROCEDURE — 86335 IMMUNFIX E-PHORSIS/URINE/CSF: CPT

## 2017-01-01 PROCEDURE — 99219 INITIAL OBSERVATION CARE,LEVL II: CPT | Performed by: INTERNAL MEDICINE

## 2017-01-01 PROCEDURE — 99223 1ST HOSP IP/OBS HIGH 75: CPT | Performed by: SPECIALIST

## 2017-01-01 PROCEDURE — 73552 X-RAY EXAM OF FEMUR 2/>: CPT | Performed by: RADIOLOGY

## 2017-01-01 PROCEDURE — 85610 PROTHROMBIN TIME: CPT | Performed by: EMERGENCY MEDICINE

## 2017-01-01 PROCEDURE — 99153 MOD SED SAME PHYS/QHP EA: CPT

## 2017-01-01 DEVICE — IMPLANTABLE DEVICE: Type: IMPLANTABLE DEVICE | Site: NECK | Status: FUNCTIONAL

## 2017-01-01 DEVICE — VUEPOINT II SCREW 3.5X12 MA: Type: IMPLANTABLE DEVICE | Site: NECK | Status: FUNCTIONAL

## 2017-01-01 DEVICE — VUEPOINT II ROD 80 PRE-CONTOUR: Type: IMPLANTABLE DEVICE | Site: NECK | Status: FUNCTIONAL

## 2017-01-01 DEVICE — VP II SET SCRW TULIP+LMNR HOOK: Type: IMPLANTABLE DEVICE | Site: NECK | Status: FUNCTIONAL

## 2017-01-01 RX ORDER — LACTULOSE 20 G/30ML
20 SOLUTION ORAL 2 TIMES DAILY
Qty: 946 ML | Refills: 0 | Status: SHIPPED | OUTPATIENT
Start: 2017-01-01 | End: 2017-01-01

## 2017-01-01 RX ORDER — ATROPINE SULFATE 0.4 MG/ML
0.4 AMPUL (ML) INJECTION ONCE
Status: COMPLETED | OUTPATIENT
Start: 2017-01-01 | End: 2017-01-01

## 2017-01-01 RX ORDER — HYDROMORPHONE HYDROCHLORIDE 1 MG/ML
0.2 INJECTION, SOLUTION INTRAMUSCULAR; INTRAVENOUS; SUBCUTANEOUS EVERY 2 HOUR PRN
Status: CANCELLED | OUTPATIENT
Start: 2017-01-01

## 2017-01-01 RX ORDER — PHENYLEPHRINE HCL IN 0.9% NACL 50MG/250ML
PLASTIC BAG, INJECTION (ML) INTRAVENOUS
Status: DISPENSED
Start: 2017-01-01 | End: 2017-01-01

## 2017-01-01 RX ORDER — HYDROMORPHONE HYDROCHLORIDE 1 MG/ML
0.5 INJECTION, SOLUTION INTRAMUSCULAR; INTRAVENOUS; SUBCUTANEOUS EVERY 30 MIN PRN
Status: DISPENSED | OUTPATIENT
Start: 2017-01-01 | End: 2017-01-01

## 2017-01-01 RX ORDER — POTASSIUM CHLORIDE 20 MEQ/1
40 TABLET, EXTENDED RELEASE ORAL ONCE
Status: COMPLETED | OUTPATIENT
Start: 2017-01-01 | End: 2017-01-01

## 2017-01-01 RX ORDER — ASPIRIN 81 MG/1
81 TABLET, CHEWABLE ORAL DAILY
Status: DISCONTINUED | OUTPATIENT
Start: 2017-01-01 | End: 2017-01-01

## 2017-01-01 RX ORDER — SENNA AND DOCUSATE SODIUM 50; 8.6 MG/1; MG/1
2 TABLET, FILM COATED ORAL 2 TIMES DAILY
Status: DISCONTINUED | OUTPATIENT
Start: 2017-01-01 | End: 2017-01-01

## 2017-01-01 RX ORDER — HYDROMORPHONE HYDROCHLORIDE 1 MG/ML
1 INJECTION, SOLUTION INTRAMUSCULAR; INTRAVENOUS; SUBCUTANEOUS ONCE
Status: CANCELLED
Start: 2017-01-01 | End: 2017-01-01

## 2017-01-01 RX ORDER — ACETAMINOPHEN 325 MG/1
650 TABLET ORAL ONCE
Status: COMPLETED | OUTPATIENT
Start: 2017-01-01 | End: 2017-01-01

## 2017-01-01 RX ORDER — HYDROMORPHONE HYDROCHLORIDE 1 MG/ML
0.8 INJECTION, SOLUTION INTRAMUSCULAR; INTRAVENOUS; SUBCUTANEOUS EVERY 2 HOUR PRN
Status: DISCONTINUED | OUTPATIENT
Start: 2017-01-01 | End: 2017-01-01

## 2017-01-01 RX ORDER — HYDROMORPHONE HYDROCHLORIDE 1 MG/ML
1 INJECTION, SOLUTION INTRAMUSCULAR; INTRAVENOUS; SUBCUTANEOUS EVERY 2 HOUR PRN
Status: CANCELLED
Start: 2017-01-01

## 2017-01-01 RX ORDER — SODIUM CHLORIDE 0.9 % (FLUSH) 0.9 %
10 SYRINGE (ML) INJECTION ONCE
Status: COMPLETED | OUTPATIENT
Start: 2017-01-01 | End: 2017-01-01

## 2017-01-01 RX ORDER — ALBUTEROL SULFATE 90 UG/1
2 AEROSOL, METERED RESPIRATORY (INHALATION) AS NEEDED
Status: CANCELLED | OUTPATIENT
Start: 2017-01-01

## 2017-01-01 RX ORDER — SODIUM CHLORIDE 0.9 % (FLUSH) 0.9 %
10 SYRINGE (ML) INJECTION ONCE
Status: CANCELLED | OUTPATIENT
Start: 2017-01-01

## 2017-01-01 RX ORDER — BISACODYL 10 MG
10 SUPPOSITORY, RECTAL RECTAL
Status: DISCONTINUED | OUTPATIENT
Start: 2017-01-01 | End: 2017-01-01

## 2017-01-01 RX ORDER — SODIUM CHLORIDE 9 MG/ML
INJECTION, SOLUTION INTRAVENOUS CONTINUOUS
Status: DISCONTINUED | OUTPATIENT
Start: 2017-01-01 | End: 2017-01-01

## 2017-01-01 RX ORDER — DEXAMETHASONE SODIUM PHOSPHATE 4 MG/ML
2 VIAL (ML) INJECTION EVERY 12 HOURS
Status: COMPLETED | OUTPATIENT
Start: 2017-01-01 | End: 2017-01-01

## 2017-01-01 RX ORDER — DEXAMETHASONE SODIUM PHOSPHATE 4 MG/ML
4 VIAL (ML) INJECTION ONCE
Status: DISCONTINUED | OUTPATIENT
Start: 2017-01-01 | End: 2017-01-01

## 2017-01-01 RX ORDER — SODIUM CHLORIDE 9 MG/ML
INJECTION, SOLUTION INTRAVENOUS CONTINUOUS
Status: ACTIVE | OUTPATIENT
Start: 2017-01-01 | End: 2017-01-01

## 2017-01-01 RX ORDER — HYDROMORPHONE HYDROCHLORIDE 1 MG/ML
1 INJECTION, SOLUTION INTRAMUSCULAR; INTRAVENOUS; SUBCUTANEOUS ONCE
Status: DISCONTINUED | OUTPATIENT
Start: 2017-01-01 | End: 2017-01-01

## 2017-01-01 RX ORDER — ONDANSETRON 2 MG/ML
8 INJECTION INTRAMUSCULAR; INTRAVENOUS EVERY 6 HOURS PRN
Status: CANCELLED | OUTPATIENT
Start: 2017-01-01

## 2017-01-01 RX ORDER — MORPHINE SULFATE 30 MG/1
30 TABLET, FILM COATED, EXTENDED RELEASE ORAL EVERY 12 HOURS SCHEDULED
Qty: 60 TABLET | Refills: 0 | Status: SHIPPED | OUTPATIENT
Start: 2017-01-01 | End: 2017-01-01

## 2017-01-01 RX ORDER — ACETAMINOPHEN 325 MG/1
TABLET ORAL EVERY 6 HOURS PRN
Status: CANCELLED | OUTPATIENT
Start: 2017-01-01

## 2017-01-01 RX ORDER — PANTOPRAZOLE SODIUM 40 MG/1
40 TABLET, DELAYED RELEASE ORAL
Status: DISCONTINUED | OUTPATIENT
Start: 2017-01-01 | End: 2017-01-01

## 2017-01-01 RX ORDER — DIPHENOXYLATE HYDROCHLORIDE AND ATROPINE SULFATE 2.5; .025 MG/1; MG/1
1-2 TABLET ORAL 4 TIMES DAILY PRN
Status: DISCONTINUED | OUTPATIENT
Start: 2017-01-01 | End: 2017-01-01

## 2017-01-01 RX ORDER — HYDROMORPHONE HYDROCHLORIDE 1 MG/ML
0.4 INJECTION, SOLUTION INTRAMUSCULAR; INTRAVENOUS; SUBCUTANEOUS EVERY 2 HOUR PRN
Status: DISCONTINUED | OUTPATIENT
Start: 2017-01-01 | End: 2017-01-01

## 2017-01-01 RX ORDER — HYDROMORPHONE HYDROCHLORIDE 1 MG/ML
1 INJECTION, SOLUTION INTRAMUSCULAR; INTRAVENOUS; SUBCUTANEOUS ONCE
Status: COMPLETED | OUTPATIENT
Start: 2017-01-01 | End: 2017-01-01

## 2017-01-01 RX ORDER — MAGNESIUM OXIDE 400 MG (241.3 MG MAGNESIUM) TABLET
400 TABLET ONCE
Status: COMPLETED | OUTPATIENT
Start: 2017-01-01 | End: 2017-01-01

## 2017-01-01 RX ORDER — HYDROMORPHONE HYDROCHLORIDE 4 MG/1
4 TABLET ORAL EVERY 2 HOUR PRN
Status: DISCONTINUED | OUTPATIENT
Start: 2017-01-01 | End: 2017-01-01

## 2017-01-01 RX ORDER — SODIUM CHLORIDE 9 MG/ML
INJECTION, SOLUTION INTRAVENOUS CONTINUOUS
Status: CANCELLED
Start: 2017-01-01

## 2017-01-01 RX ORDER — FAMOTIDINE 10 MG/ML
20 INJECTION, SOLUTION INTRAVENOUS ONCE
Status: COMPLETED | OUTPATIENT
Start: 2017-01-01 | End: 2017-01-01

## 2017-01-01 RX ORDER — SODIUM CHLORIDE, SODIUM LACTATE, POTASSIUM CHLORIDE, CALCIUM CHLORIDE 600; 310; 30; 20 MG/100ML; MG/100ML; MG/100ML; MG/100ML
INJECTION, SOLUTION INTRAVENOUS CONTINUOUS
Status: DISCONTINUED | OUTPATIENT
Start: 2017-01-01 | End: 2017-01-01

## 2017-01-01 RX ORDER — DIPHENOXYLATE HYDROCHLORIDE AND ATROPINE SULFATE 2.5; .025 MG/1; MG/1
1 TABLET ORAL 4 TIMES DAILY PRN
Status: DISCONTINUED | OUTPATIENT
Start: 2017-01-01 | End: 2017-01-01

## 2017-01-01 RX ORDER — LIDOCAINE 50 MG/G
1 PATCH TOPICAL EVERY 24 HOURS
Qty: 10 PATCH | Refills: 0 | Status: SHIPPED | OUTPATIENT
Start: 2017-01-01 | End: 2017-01-01

## 2017-01-01 RX ORDER — ATORVASTATIN CALCIUM 10 MG/1
10 TABLET, FILM COATED ORAL NIGHTLY
Status: DISCONTINUED | OUTPATIENT
Start: 2017-01-01 | End: 2017-01-01

## 2017-01-01 RX ORDER — FENTANYL 25 UG/H
1 PATCH TRANSDERMAL
Qty: 10 PATCH | Refills: 0 | Status: SHIPPED | OUTPATIENT
Start: 2017-01-01 | End: 2017-01-01

## 2017-01-01 RX ORDER — ONDANSETRON 2 MG/ML
4 INJECTION INTRAMUSCULAR; INTRAVENOUS ONCE
Status: COMPLETED | OUTPATIENT
Start: 2017-01-01 | End: 2017-01-01

## 2017-01-01 RX ORDER — MORPHINE SULFATE 15 MG/1
TABLET ORAL
Status: DISCONTINUED | OUTPATIENT
Start: 2017-01-01 | End: 2017-01-01

## 2017-01-01 RX ORDER — ONDANSETRON 2 MG/ML
4 INJECTION INTRAMUSCULAR; INTRAVENOUS EVERY 6 HOURS PRN
Status: DISCONTINUED | OUTPATIENT
Start: 2017-01-01 | End: 2017-01-01

## 2017-01-01 RX ORDER — HYDROMORPHONE HYDROCHLORIDE 1 MG/ML
INJECTION, SOLUTION INTRAMUSCULAR; INTRAVENOUS; SUBCUTANEOUS
Status: DISPENSED
Start: 2017-01-01 | End: 2017-01-01

## 2017-01-01 RX ORDER — ACETAMINOPHEN 325 MG/1
650 TABLET ORAL EVERY 6 HOURS PRN
Status: DISCONTINUED | OUTPATIENT
Start: 2017-01-01 | End: 2017-01-01

## 2017-01-01 RX ORDER — LORAZEPAM 0.5 MG/1
TABLET ORAL EVERY 4 HOURS PRN
Status: CANCELLED | OUTPATIENT
Start: 2017-01-01

## 2017-01-01 RX ORDER — POTASSIUM CHLORIDE 29.8 MG/ML
40 INJECTION INTRAVENOUS ONCE
Status: COMPLETED | OUTPATIENT
Start: 2017-01-01 | End: 2017-01-01

## 2017-01-01 RX ORDER — SENNOSIDES 8.6 MG
17.2 TABLET ORAL 2 TIMES DAILY
Status: DISCONTINUED | OUTPATIENT
Start: 2017-01-01 | End: 2017-01-01

## 2017-01-01 RX ORDER — IPRATROPIUM BROMIDE AND ALBUTEROL SULFATE 2.5; .5 MG/3ML; MG/3ML
3 SOLUTION RESPIRATORY (INHALATION) AS NEEDED
Status: DISCONTINUED | OUTPATIENT
Start: 2017-01-01 | End: 2017-01-01

## 2017-01-01 RX ORDER — HEPARIN SODIUM 5000 [USP'U]/ML
INJECTION, SOLUTION INTRAVENOUS; SUBCUTANEOUS
Status: COMPLETED
Start: 2017-01-01 | End: 2017-01-01

## 2017-01-01 RX ORDER — HYDROMORPHONE HYDROCHLORIDE 1 MG/ML
0.5 INJECTION, SOLUTION INTRAMUSCULAR; INTRAVENOUS; SUBCUTANEOUS EVERY 2 HOUR PRN
Status: DISCONTINUED | OUTPATIENT
Start: 2017-01-01 | End: 2017-01-01

## 2017-01-01 RX ORDER — BACITRACIN 50000 [USP'U]/1
INJECTION, POWDER, LYOPHILIZED, FOR SOLUTION INTRAMUSCULAR AS NEEDED
Status: DISCONTINUED | OUTPATIENT
Start: 2017-01-01 | End: 2017-01-01 | Stop reason: HOSPADM

## 2017-01-01 RX ORDER — DIPHENHYDRAMINE HYDROCHLORIDE 50 MG/ML
25 INJECTION INTRAMUSCULAR; INTRAVENOUS EVERY 4 HOURS PRN
Status: DISCONTINUED | OUTPATIENT
Start: 2017-01-01 | End: 2017-01-01

## 2017-01-01 RX ORDER — HYDROMORPHONE HYDROCHLORIDE 2 MG/1
TABLET ORAL EVERY 2 HOUR PRN
Status: DISCONTINUED | OUTPATIENT
Start: 2017-01-01 | End: 2017-01-01 | Stop reason: SDUPTHER

## 2017-01-01 RX ORDER — HYDROMORPHONE HYDROCHLORIDE 1 MG/ML
0.5 INJECTION, SOLUTION INTRAMUSCULAR; INTRAVENOUS; SUBCUTANEOUS EVERY 30 MIN PRN
Status: DISCONTINUED | OUTPATIENT
Start: 2017-01-01 | End: 2017-01-01

## 2017-01-01 RX ORDER — AMLODIPINE BESYLATE 5 MG/1
5 TABLET ORAL DAILY
Qty: 30 TABLET | Refills: 0 | Status: SHIPPED | OUTPATIENT
Start: 2017-01-01 | End: 2017-01-01

## 2017-01-01 RX ORDER — SIMVASTATIN 20 MG
20 TABLET ORAL NIGHTLY
COMMUNITY
End: 2017-01-01

## 2017-01-01 RX ORDER — HEPARIN SODIUM 5000 [USP'U]/ML
5000 INJECTION, SOLUTION INTRAVENOUS; SUBCUTANEOUS EVERY 8 HOURS SCHEDULED
Status: DISCONTINUED | OUTPATIENT
Start: 2017-01-01 | End: 2017-01-01

## 2017-01-01 RX ORDER — ONDANSETRON 2 MG/ML
4 INJECTION INTRAMUSCULAR; INTRAVENOUS EVERY 4 HOURS PRN
Status: DISCONTINUED | OUTPATIENT
Start: 2017-01-01 | End: 2017-01-01

## 2017-01-01 RX ORDER — LORAZEPAM 2 MG/ML
INJECTION INTRAMUSCULAR EVERY 4 HOURS PRN
Status: CANCELLED | OUTPATIENT
Start: 2017-01-01

## 2017-01-01 RX ORDER — HYDROMORPHONE HYDROCHLORIDE 1 MG/ML
2 INJECTION, SOLUTION INTRAMUSCULAR; INTRAVENOUS; SUBCUTANEOUS ONCE
Status: COMPLETED | OUTPATIENT
Start: 2017-01-01 | End: 2017-01-01

## 2017-01-01 RX ORDER — ZOLPIDEM TARTRATE 10 MG/1
10 TABLET ORAL NIGHTLY PRN
Qty: 30 TABLET | Refills: 5 | Status: SHIPPED | COMMUNITY
Start: 2017-01-01 | End: 2017-01-01

## 2017-01-01 RX ORDER — ACETAMINOPHEN 10 MG/ML
1000 INJECTION, SOLUTION INTRAVENOUS EVERY 6 HOURS
Status: DISCONTINUED | OUTPATIENT
Start: 2017-01-01 | End: 2017-01-01

## 2017-01-01 RX ORDER — LORAZEPAM 2 MG/ML
0.5 INJECTION INTRAMUSCULAR EVERY 4 HOURS PRN
Status: DISCONTINUED | OUTPATIENT
Start: 2017-01-01 | End: 2017-01-01

## 2017-01-01 RX ORDER — SODIUM PHOSPHATE, DIBASIC AND SODIUM PHOSPHATE, MONOBASIC 7; 19 G/133ML; G/133ML
1 ENEMA RECTAL ONCE AS NEEDED
Status: DISCONTINUED | OUTPATIENT
Start: 2017-01-01 | End: 2017-01-01

## 2017-01-01 RX ORDER — PANTOPRAZOLE SODIUM 40 MG/1
40 TABLET, DELAYED RELEASE ORAL
Qty: 60 TABLET | Refills: 0 | Status: SHIPPED | OUTPATIENT
Start: 2017-01-01 | End: 2017-01-01

## 2017-01-01 RX ORDER — POLYETHYLENE GLYCOL 3350 17 G/17G
17 POWDER, FOR SOLUTION ORAL DAILY
Status: DISCONTINUED | OUTPATIENT
Start: 2017-01-01 | End: 2017-01-01

## 2017-01-01 RX ORDER — ONDANSETRON 4 MG/1
4 TABLET, ORALLY DISINTEGRATING ORAL EVERY 8 HOURS PRN
Qty: 10 TABLET | Refills: 0 | Status: SHIPPED | OUTPATIENT
Start: 2017-01-01 | End: 2017-01-01

## 2017-01-01 RX ORDER — IPRATROPIUM BROMIDE AND ALBUTEROL SULFATE 2.5; .5 MG/3ML; MG/3ML
3 SOLUTION RESPIRATORY (INHALATION)
Status: DISCONTINUED | OUTPATIENT
Start: 2017-01-01 | End: 2017-01-01

## 2017-01-01 RX ORDER — HYDROMORPHONE HYDROCHLORIDE 1 MG/ML
0.4 INJECTION, SOLUTION INTRAMUSCULAR; INTRAVENOUS; SUBCUTANEOUS EVERY 2 HOUR PRN
Status: CANCELLED | OUTPATIENT
Start: 2017-01-01

## 2017-01-01 RX ORDER — DEXMEDETOMIDINE HYDROCHLORIDE 4 UG/ML
INJECTION, SOLUTION INTRAVENOUS CONTINUOUS
Status: DISCONTINUED | OUTPATIENT
Start: 2017-01-01 | End: 2017-01-01

## 2017-01-01 RX ORDER — DIPHENHYDRAMINE HCL 25 MG
25 CAPSULE ORAL EVERY 4 HOURS PRN
Status: DISCONTINUED | OUTPATIENT
Start: 2017-01-01 | End: 2017-01-01

## 2017-01-01 RX ORDER — DIPHENHYDRAMINE HYDROCHLORIDE 50 MG/ML
INJECTION INTRAMUSCULAR; INTRAVENOUS EVERY 4 HOURS PRN
Status: CANCELLED | OUTPATIENT
Start: 2017-01-01

## 2017-01-01 RX ORDER — DEXAMETHASONE 4 MG/1
8 TABLET ORAL EVERY 12 HOURS SCHEDULED
Status: COMPLETED | OUTPATIENT
Start: 2017-01-01 | End: 2017-01-01

## 2017-01-01 RX ORDER — HYDROMORPHONE HYDROCHLORIDE 1 MG/ML
2 INJECTION, SOLUTION INTRAMUSCULAR; INTRAVENOUS; SUBCUTANEOUS ONCE
Status: CANCELLED
Start: 2017-01-01 | End: 2017-01-01

## 2017-01-01 RX ORDER — MORPHINE SULFATE 30 MG/1
60 TABLET, FILM COATED, EXTENDED RELEASE ORAL EVERY 12 HOURS SCHEDULED
Status: DISCONTINUED | OUTPATIENT
Start: 2017-01-01 | End: 2017-01-01

## 2017-01-01 RX ORDER — MEPERIDINE HYDROCHLORIDE 25 MG/ML
INJECTION INTRAMUSCULAR; INTRAVENOUS; SUBCUTANEOUS AS NEEDED
Status: CANCELLED | OUTPATIENT
Start: 2017-01-01

## 2017-01-01 RX ORDER — MORPHINE SULFATE 15 MG/1
15 TABLET ORAL
Qty: 90 TABLET | Refills: 0 | Status: ON HOLD | OUTPATIENT
Start: 2017-01-01 | End: 2017-01-01

## 2017-01-01 RX ORDER — LUBIPROSTONE 8 UG/1
8 CAPSULE, GELATIN COATED ORAL 2 TIMES DAILY WITH MEALS
Status: DISCONTINUED | OUTPATIENT
Start: 2017-01-01 | End: 2017-01-01

## 2017-01-01 RX ORDER — POLYETHYLENE GLYCOL 3350 17 G/17G
17 POWDER, FOR SOLUTION ORAL DAILY
COMMUNITY
End: 2017-01-01

## 2017-01-01 RX ORDER — DIPHENOXYLATE HYDROCHLORIDE AND ATROPINE SULFATE 2.5; .025 MG/1; MG/1
1-2 TABLET ORAL 4 TIMES DAILY PRN
Qty: 30 TABLET | Refills: 0 | Status: ON HOLD | COMMUNITY
Start: 2017-01-01 | End: 2017-01-01

## 2017-01-01 RX ORDER — AMOXICILLIN AND CLAVULANATE POTASSIUM 875; 125 MG/1; MG/1
1 TABLET, FILM COATED ORAL 2 TIMES DAILY
Qty: 6 TABLET | Refills: 0 | Status: SHIPPED | OUTPATIENT
Start: 2017-01-01 | End: 2017-01-01

## 2017-01-01 RX ORDER — HYDROMORPHONE HYDROCHLORIDE 1 MG/ML
0.5 INJECTION, SOLUTION INTRAMUSCULAR; INTRAVENOUS; SUBCUTANEOUS ONCE
Status: COMPLETED | OUTPATIENT
Start: 2017-01-01 | End: 2017-01-01

## 2017-01-01 RX ORDER — ZOLPIDEM TARTRATE 10 MG/1
10 TABLET ORAL NIGHTLY PRN
Status: DISCONTINUED | OUTPATIENT
Start: 2017-01-01 | End: 2017-01-01

## 2017-01-01 RX ORDER — HYDROMORPHONE HYDROCHLORIDE 1 MG/ML
1 INJECTION, SOLUTION INTRAMUSCULAR; INTRAVENOUS; SUBCUTANEOUS EVERY 30 MIN PRN
Status: COMPLETED | OUTPATIENT
Start: 2017-01-01 | End: 2017-01-01

## 2017-01-01 RX ORDER — POTASSIUM CHLORIDE 14.9 MG/ML
20 INJECTION INTRAVENOUS ONCE
Status: COMPLETED | OUTPATIENT
Start: 2017-01-01 | End: 2017-01-01

## 2017-01-01 RX ORDER — ACYCLOVIR 400 MG/1
400 TABLET ORAL 2 TIMES DAILY
Status: DISCONTINUED | OUTPATIENT
Start: 2017-01-01 | End: 2017-01-01

## 2017-01-01 RX ORDER — HYDRALAZINE HYDROCHLORIDE 20 MG/ML
10 INJECTION INTRAMUSCULAR; INTRAVENOUS EVERY 6 HOURS PRN
Status: DISCONTINUED | OUTPATIENT
Start: 2017-01-01 | End: 2017-01-01

## 2017-01-01 RX ORDER — METOCLOPRAMIDE HYDROCHLORIDE 5 MG/ML
10 INJECTION INTRAMUSCULAR; INTRAVENOUS EVERY 6 HOURS PRN
Status: CANCELLED | OUTPATIENT
Start: 2017-01-01

## 2017-01-01 RX ORDER — FLUTICASONE PROPIONATE 50 MCG
1 SPRAY, SUSPENSION (ML) NASAL DAILY
Status: DISCONTINUED | OUTPATIENT
Start: 2017-01-01 | End: 2017-01-01

## 2017-01-01 RX ORDER — DEXAMETHASONE SODIUM PHOSPHATE 4 MG/ML
4 VIAL (ML) INJECTION EVERY 6 HOURS
Status: DISCONTINUED | OUTPATIENT
Start: 2017-01-01 | End: 2017-01-01

## 2017-01-01 RX ORDER — RANITIDINE 25 MG/ML
50 INJECTION, SOLUTION INTRAMUSCULAR; INTRAVENOUS AS NEEDED
Status: CANCELLED | OUTPATIENT
Start: 2017-01-01

## 2017-01-01 RX ORDER — SENNOSIDES 8.6 MG
8.6 TABLET ORAL 2 TIMES DAILY
COMMUNITY
End: 2017-01-01

## 2017-01-01 RX ORDER — DEXAMETHASONE SODIUM PHOSPHATE 4 MG/ML
3 VIAL (ML) INJECTION EVERY 8 HOURS
Status: COMPLETED | OUTPATIENT
Start: 2017-01-01 | End: 2017-01-01

## 2017-01-01 RX ORDER — LACTULOSE 20 G/30ML
20 SOLUTION ORAL 2 TIMES DAILY
Qty: 1 BOTTLE | Refills: 5 | Status: SHIPPED | OUTPATIENT
Start: 2017-01-01 | End: 2017-01-01

## 2017-01-01 RX ORDER — POLYETHYLENE GLYCOL 3350 17 G/17G
17 POWDER, FOR SOLUTION ORAL DAILY PRN
Status: DISCONTINUED | OUTPATIENT
Start: 2017-01-01 | End: 2017-01-01

## 2017-01-01 RX ORDER — LUBIPROSTONE 24 UG/1
24 CAPSULE, GELATIN COATED ORAL 2 TIMES DAILY WITH MEALS
Status: DISCONTINUED | OUTPATIENT
Start: 2017-01-01 | End: 2017-01-01

## 2017-01-01 RX ORDER — METRONIDAZOLE 500 MG/1
500 TABLET ORAL EVERY 8 HOURS SCHEDULED
Status: DISCONTINUED | OUTPATIENT
Start: 2017-01-01 | End: 2017-01-01

## 2017-01-01 RX ORDER — ONDANSETRON 2 MG/ML
4 INJECTION INTRAMUSCULAR; INTRAVENOUS AS NEEDED
Status: ACTIVE | OUTPATIENT
Start: 2017-01-01 | End: 2017-01-01

## 2017-01-01 RX ORDER — HYDROMORPHONE HYDROCHLORIDE 1 MG/ML
2 INJECTION, SOLUTION INTRAMUSCULAR; INTRAVENOUS; SUBCUTANEOUS EVERY 2 HOUR PRN
Status: CANCELLED
Start: 2017-01-01

## 2017-01-01 RX ORDER — POTASSIUM CHLORIDE 20 MEQ/1
40 TABLET, EXTENDED RELEASE ORAL EVERY 4 HOURS
Status: DISPENSED | OUTPATIENT
Start: 2017-01-01 | End: 2017-01-01

## 2017-01-01 RX ORDER — OXYCODONE HYDROCHLORIDE 5 MG/1
TABLET ORAL
Status: DISCONTINUED | OUTPATIENT
Start: 2017-01-01 | End: 2017-01-01 | Stop reason: SDUPTHER

## 2017-01-01 RX ORDER — SODIUM CHLORIDE 9 MG/ML
INJECTION, SOLUTION INTRAVENOUS ONCE
Status: COMPLETED | OUTPATIENT
Start: 2017-01-01 | End: 2017-01-01

## 2017-01-01 RX ORDER — MORPHINE SULFATE 60 MG/1
60 TABLET, FILM COATED, EXTENDED RELEASE ORAL EVERY 12 HOURS SCHEDULED
Status: DISCONTINUED | OUTPATIENT
Start: 2017-01-01 | End: 2017-01-01

## 2017-01-01 RX ORDER — HYDROMORPHONE HYDROCHLORIDE 1 MG/ML
0.5 INJECTION, SOLUTION INTRAMUSCULAR; INTRAVENOUS; SUBCUTANEOUS EVERY 30 MIN PRN
Status: ACTIVE | OUTPATIENT
Start: 2017-01-01 | End: 2017-01-01

## 2017-01-01 RX ORDER — HYDROMORPHONE HYDROCHLORIDE 1 MG/ML
1 INJECTION, SOLUTION INTRAMUSCULAR; INTRAVENOUS; SUBCUTANEOUS EVERY 30 MIN PRN
Status: DISCONTINUED | OUTPATIENT
Start: 2017-01-01 | End: 2017-01-01

## 2017-01-01 RX ORDER — SODIUM CHLORIDE 9 MG/ML
1000 INJECTION, SOLUTION INTRAVENOUS ONCE
Status: COMPLETED | OUTPATIENT
Start: 2017-01-01 | End: 2017-01-01

## 2017-01-01 RX ORDER — LACTULOSE 10 G/15ML
20 SOLUTION ORAL 2 TIMES DAILY
Status: DISCONTINUED | OUTPATIENT
Start: 2017-01-01 | End: 2017-01-01

## 2017-01-01 RX ORDER — HYDROMORPHONE HYDROCHLORIDE 1 MG/ML
1 INJECTION, SOLUTION INTRAMUSCULAR; INTRAVENOUS; SUBCUTANEOUS EVERY 2 HOUR PRN
Status: DISCONTINUED | OUTPATIENT
Start: 2017-01-01 | End: 2017-01-01

## 2017-01-01 RX ORDER — SIMVASTATIN 20 MG
TABLET ORAL
Qty: 90 TABLET | Refills: 0 | Status: SHIPPED | OUTPATIENT
Start: 2017-01-01 | End: 2017-01-01

## 2017-01-01 RX ORDER — SENNOSIDES 8.6 MG
17.2 TABLET ORAL 2 TIMES DAILY
Qty: 60 TABLET | Refills: 0 | Status: SHIPPED | OUTPATIENT
Start: 2017-01-01 | End: 2017-01-01

## 2017-01-01 RX ORDER — FENTANYL 75 UG/H
1 PATCH TRANSDERMAL
Qty: 10 PATCH | Refills: 0 | Status: SHIPPED | OUTPATIENT
Start: 2017-01-01 | End: 2017-01-01

## 2017-01-01 RX ORDER — HYDROMORPHONE HYDROCHLORIDE 4 MG/1
8 TABLET ORAL
Status: DISCONTINUED | OUTPATIENT
Start: 2017-01-01 | End: 2017-01-01

## 2017-01-01 RX ORDER — MORPHINE SULFATE 15 MG/1
30 TABLET ORAL
Status: DISCONTINUED | OUTPATIENT
Start: 2017-01-01 | End: 2017-01-01

## 2017-01-01 RX ORDER — LUBIPROSTONE 24 UG/1
24 CAPSULE, GELATIN COATED ORAL 2 TIMES DAILY WITH MEALS
COMMUNITY
End: 2017-01-01

## 2017-01-01 RX ORDER — ENOXAPARIN SODIUM 100 MG/ML
40 INJECTION SUBCUTANEOUS DAILY
Status: DISCONTINUED | OUTPATIENT
Start: 2017-01-01 | End: 2017-01-01

## 2017-01-01 RX ORDER — POTASSIUM CHLORIDE 20 MEQ/1
40 TABLET, EXTENDED RELEASE ORAL EVERY 4 HOURS
Status: COMPLETED | OUTPATIENT
Start: 2017-01-01 | End: 2017-01-01

## 2017-01-01 RX ORDER — DRONABINOL 2.5 MG/1
2.5 CAPSULE ORAL
Qty: 60 CAPSULE | Refills: 0 | Status: SHIPPED | OUTPATIENT
Start: 2017-01-01 | End: 2017-01-01

## 2017-01-01 RX ORDER — KETOROLAC TROMETHAMINE 30 MG/ML
30 INJECTION, SOLUTION INTRAMUSCULAR; INTRAVENOUS EVERY 6 HOURS PRN
Status: DISCONTINUED | OUTPATIENT
Start: 2017-01-01 | End: 2017-01-01

## 2017-01-01 RX ORDER — ZOLPIDEM TARTRATE 10 MG/1
10 TABLET ORAL NIGHTLY PRN
Qty: 30 TABLET | Refills: 3 | OUTPATIENT
Start: 2017-01-01 | End: 2017-01-01

## 2017-01-01 RX ORDER — DOCUSATE SODIUM 100 MG/1
100 CAPSULE, LIQUID FILLED ORAL 2 TIMES DAILY
Qty: 60 CAPSULE | Refills: 5 | Status: SHIPPED | OUTPATIENT
Start: 2017-01-01 | End: 2017-01-01

## 2017-01-01 RX ORDER — OXYCODONE HYDROCHLORIDE 5 MG/1
5 TABLET ORAL
Status: DISCONTINUED | OUTPATIENT
Start: 2017-01-01 | End: 2017-01-01

## 2017-01-01 RX ORDER — HYDROMORPHONE HYDROCHLORIDE 1 MG/ML
2 INJECTION, SOLUTION INTRAMUSCULAR; INTRAVENOUS; SUBCUTANEOUS EVERY 2 HOUR PRN
Status: DISCONTINUED | OUTPATIENT
Start: 2017-01-01 | End: 2017-01-01

## 2017-01-01 RX ORDER — FENTANYL 25 UG/H
1 PATCH TRANSDERMAL
Qty: 5 PATCH | Refills: 0 | Status: SHIPPED | OUTPATIENT
Start: 2017-01-01 | End: 2017-01-01

## 2017-01-01 RX ORDER — HYDROMORPHONE HYDROCHLORIDE 1 MG/ML
0.5 INJECTION, SOLUTION INTRAMUSCULAR; INTRAVENOUS; SUBCUTANEOUS EVERY 30 MIN PRN
Status: DISCONTINUED | OUTPATIENT
Start: 2017-01-01 | End: 2017-01-01 | Stop reason: ALTCHOICE

## 2017-01-01 RX ORDER — ORPHENADRINE CITRATE 30 MG/ML
30 INJECTION INTRAMUSCULAR; INTRAVENOUS EVERY 6 HOURS
Status: DISCONTINUED | OUTPATIENT
Start: 2017-01-01 | End: 2017-01-01

## 2017-01-01 RX ORDER — ARIPIPRAZOLE 15 MG/1
40 TABLET ORAL ONCE
Status: DISCONTINUED | OUTPATIENT
Start: 2017-01-01 | End: 2017-01-01

## 2017-01-01 RX ORDER — FENTANYL 100 UG/H
1 PATCH TRANSDERMAL
Qty: 10 PATCH | Refills: 0 | Status: SHIPPED | OUTPATIENT
Start: 2017-01-01 | End: 2017-01-01

## 2017-01-01 RX ORDER — DEXTROSE MONOHYDRATE 50 MG/ML
INJECTION, SOLUTION INTRAVENOUS CONTINUOUS
Status: DISCONTINUED | OUTPATIENT
Start: 2017-01-01 | End: 2017-01-01

## 2017-01-01 RX ORDER — HYDROMORPHONE HYDROCHLORIDE 1 MG/ML
0.8 INJECTION, SOLUTION INTRAMUSCULAR; INTRAVENOUS; SUBCUTANEOUS EVERY 2 HOUR PRN
Status: CANCELLED | OUTPATIENT
Start: 2017-01-01

## 2017-01-01 RX ORDER — POLYETHYLENE GLYCOL 3350 17 G/17G
17 POWDER, FOR SOLUTION ORAL DAILY
Qty: 1 BOTTLE | Refills: 0 | Status: SHIPPED | OUTPATIENT
Start: 2017-01-01 | End: 2017-01-01

## 2017-01-01 RX ORDER — LIDOCAINE 4 G/G
1 PATCH TOPICAL DAILY
Qty: 10 PATCH | Refills: 0 | Status: ON HOLD | OUTPATIENT
Start: 2017-01-01 | End: 2017-01-01

## 2017-01-01 RX ORDER — LABETALOL HYDROCHLORIDE 5 MG/ML
10 INJECTION, SOLUTION INTRAVENOUS EVERY 4 HOURS PRN
Status: DISCONTINUED | OUTPATIENT
Start: 2017-01-01 | End: 2017-01-01

## 2017-01-01 RX ORDER — MORPHINE SULFATE 60 MG/1
60 TABLET, FILM COATED, EXTENDED RELEASE ORAL EVERY 12 HOURS SCHEDULED
COMMUNITY
End: 2017-01-01

## 2017-01-01 RX ORDER — ESCITALOPRAM OXALATE 10 MG/1
10 TABLET ORAL DAILY
Status: DISCONTINUED | OUTPATIENT
Start: 2017-01-01 | End: 2017-01-01

## 2017-01-01 RX ORDER — NAPROXEN 500 MG/1
500 TABLET ORAL 2 TIMES DAILY WITH MEALS
Qty: 60 TABLET | Refills: 3 | Status: ON HOLD | OUTPATIENT
Start: 2017-01-01 | End: 2017-01-01

## 2017-01-01 RX ORDER — HYDROMORPHONE HYDROCHLORIDE 4 MG/1
4 TABLET ORAL EVERY 4 HOURS PRN
Qty: 60 TABLET | Refills: 0 | Status: ON HOLD | OUTPATIENT
Start: 2017-01-01 | End: 2017-01-01

## 2017-01-01 RX ORDER — LORAZEPAM 2 MG/ML
2 INJECTION INTRAMUSCULAR EVERY 4 HOURS PRN
Status: DISCONTINUED | OUTPATIENT
Start: 2017-01-01 | End: 2017-01-01

## 2017-01-01 RX ORDER — PROCHLORPERAZINE MALEATE 10 MG
10 TABLET ORAL EVERY 6 HOURS PRN
Status: CANCELLED | OUTPATIENT
Start: 2017-01-01

## 2017-01-01 RX ORDER — ZOLPIDEM TARTRATE 10 MG/1
10 TABLET ORAL NIGHTLY PRN
Qty: 30 TABLET | Refills: 3 | Status: SHIPPED | OUTPATIENT
Start: 2017-01-01 | End: 2017-01-01

## 2017-01-01 RX ORDER — DICYCLOMINE HYDROCHLORIDE 10 MG/1
10 CAPSULE ORAL 3 TIMES DAILY PRN
Qty: 90 CAPSULE | Refills: 0 | Status: ON HOLD | OUTPATIENT
Start: 2017-01-01 | End: 2017-01-01

## 2017-01-01 RX ORDER — MORPHINE SULFATE 15 MG/1
15 TABLET ORAL
Status: DISCONTINUED | OUTPATIENT
Start: 2017-01-01 | End: 2017-01-01

## 2017-01-01 RX ORDER — HYDROMORPHONE HYDROCHLORIDE 1 MG/ML
1 INJECTION, SOLUTION INTRAMUSCULAR; INTRAVENOUS; SUBCUTANEOUS
Status: DISCONTINUED | OUTPATIENT
Start: 2017-01-01 | End: 2017-01-01

## 2017-01-01 RX ORDER — AZITHROMYCIN 250 MG/1
500 TABLET, FILM COATED ORAL DAILY
Status: COMPLETED | OUTPATIENT
Start: 2017-01-01 | End: 2017-01-01

## 2017-01-01 RX ORDER — ONDANSETRON 2 MG/ML
4 INJECTION INTRAMUSCULAR; INTRAVENOUS EVERY 4 HOURS PRN
Status: ACTIVE | OUTPATIENT
Start: 2017-01-01 | End: 2017-01-01

## 2017-01-01 RX ORDER — SIMVASTATIN 20 MG
TABLET ORAL
Qty: 90 TABLET | Refills: 0 | OUTPATIENT
Start: 2017-01-01

## 2017-01-01 RX ORDER — DOCUSATE SODIUM 100 MG/1
100 CAPSULE, LIQUID FILLED ORAL 2 TIMES DAILY
Status: DISCONTINUED | OUTPATIENT
Start: 2017-01-01 | End: 2017-01-01

## 2017-01-01 RX ORDER — PROCHLORPERAZINE MALEATE 10 MG
10 TABLET ORAL EVERY 6 HOURS PRN
Qty: 30 TABLET | Refills: 3 | Status: SHIPPED | OUTPATIENT
Start: 2017-01-01 | End: 2017-01-01

## 2017-01-01 RX ORDER — OXYCODONE HYDROCHLORIDE 5 MG/1
TABLET ORAL
Status: DISCONTINUED | OUTPATIENT
Start: 2017-01-01 | End: 2017-01-01

## 2017-01-01 RX ORDER — DRONABINOL 2.5 MG/1
2.5 CAPSULE ORAL
Qty: 60 CAPSULE | Refills: 0 | OUTPATIENT
Start: 2017-01-01 | End: 2017-01-01

## 2017-01-01 RX ORDER — MORPHINE SULFATE 15 MG/1
TABLET ORAL
Qty: 90 TABLET | Refills: 0 | Status: SHIPPED | OUTPATIENT
Start: 2017-01-01 | End: 2017-01-01

## 2017-01-01 RX ORDER — HYDROMORPHONE HYDROCHLORIDE 1 MG/ML
0.2 INJECTION, SOLUTION INTRAMUSCULAR; INTRAVENOUS; SUBCUTANEOUS EVERY 2 HOUR PRN
Status: DISCONTINUED | OUTPATIENT
Start: 2017-01-01 | End: 2017-01-01

## 2017-01-01 RX ORDER — FLUTICASONE PROPIONATE 50 MCG
1 SPRAY, SUSPENSION (ML) NASAL DAILY
Qty: 1 BOTTLE | Status: SHIPPED | OUTPATIENT
Start: 2017-01-01 | End: 2017-01-01

## 2017-01-01 RX ORDER — TEMAZEPAM 15 MG/1
15 CAPSULE ORAL NIGHTLY PRN
Status: DISCONTINUED | OUTPATIENT
Start: 2017-01-01 | End: 2017-01-01

## 2017-01-01 RX ORDER — TAMSULOSIN HYDROCHLORIDE 0.4 MG/1
0.4 CAPSULE ORAL DAILY
Qty: 60 CAPSULE | Refills: 3 | Status: SHIPPED
Start: 2017-01-01 | End: 2017-01-01

## 2017-01-01 RX ORDER — LIDOCAINE 50 MG/G
1 PATCH TOPICAL EVERY 24 HOURS
Status: DISCONTINUED | OUTPATIENT
Start: 2017-01-01 | End: 2017-01-01

## 2017-01-01 RX ORDER — PHENYLEPHRINE HCL IN 0.9% NACL 50MG/250ML
PLASTIC BAG, INJECTION (ML) INTRAVENOUS CONTINUOUS
Status: DISCONTINUED | OUTPATIENT
Start: 2017-01-01 | End: 2017-01-01 | Stop reason: SDUPTHER

## 2017-01-01 RX ORDER — METOCLOPRAMIDE HYDROCHLORIDE 5 MG/ML
5 INJECTION INTRAMUSCULAR; INTRAVENOUS AS NEEDED
Status: ACTIVE | OUTPATIENT
Start: 2017-01-01 | End: 2017-01-01

## 2017-01-01 RX ORDER — ASCORBIC ACID 500 MG
1000 TABLET ORAL DAILY
Status: DISCONTINUED | OUTPATIENT
Start: 2017-01-01 | End: 2017-01-01

## 2017-01-01 RX ORDER — GABAPENTIN 300 MG/1
300 CAPSULE ORAL 3 TIMES DAILY
Status: DISCONTINUED | OUTPATIENT
Start: 2017-01-01 | End: 2017-01-01

## 2017-01-01 RX ORDER — MORPHINE SULFATE 30 MG/1
30 TABLET, FILM COATED, EXTENDED RELEASE ORAL EVERY 24 HOURS
Qty: 30 TABLET | Refills: 0 | Status: SHIPPED | OUTPATIENT
Start: 2017-01-01 | End: 2017-01-01

## 2017-01-01 RX ORDER — OXYCODONE HYDROCHLORIDE 10 MG/1
20 TABLET ORAL
Qty: 180 TABLET | Refills: 0 | Status: ON HOLD | OUTPATIENT
Start: 2017-01-01 | End: 2017-01-01

## 2017-01-01 RX ORDER — MIDAZOLAM HYDROCHLORIDE 1 MG/ML
INJECTION INTRAMUSCULAR; INTRAVENOUS
Status: COMPLETED
Start: 2017-01-01 | End: 2017-01-01

## 2017-01-01 RX ORDER — HEPARIN SODIUM 5000 [USP'U]/ML
5000 INJECTION, SOLUTION INTRAVENOUS; SUBCUTANEOUS EVERY 8 HOURS SCHEDULED
Status: COMPLETED | OUTPATIENT
Start: 2017-01-01 | End: 2017-01-01

## 2017-01-01 RX ORDER — ALFUZOSIN HYDROCHLORIDE 10 MG/1
10 TABLET, EXTENDED RELEASE ORAL DAILY
Status: DISCONTINUED | OUTPATIENT
Start: 2017-01-01 | End: 2017-01-01

## 2017-01-01 RX ORDER — METHYLPREDNISOLONE 4 MG/1
16 TABLET ORAL DAILY
Status: DISCONTINUED | OUTPATIENT
Start: 2017-01-01 | End: 2017-01-01

## 2017-01-01 RX ORDER — ZOLPIDEM TARTRATE 10 MG/1
TABLET ORAL
Refills: 3 | Status: ON HOLD | COMMUNITY
Start: 2017-01-01 | End: 2017-01-01

## 2017-01-01 RX ORDER — DRONABINOL 2.5 MG/1
2.5 CAPSULE ORAL
Status: DISCONTINUED | OUTPATIENT
Start: 2017-01-01 | End: 2017-01-01

## 2017-01-01 RX ORDER — LACTULOSE 10 G/15ML
SOLUTION ORAL
Qty: 5377 ML | Refills: 3 | Status: SHIPPED | OUTPATIENT
Start: 2017-01-01 | End: 2017-01-01

## 2017-01-01 RX ORDER — ALFUZOSIN HYDROCHLORIDE 10 MG/1
10 TABLET, EXTENDED RELEASE ORAL
Status: DISCONTINUED | OUTPATIENT
Start: 2017-01-01 | End: 2017-01-01

## 2017-01-01 RX ORDER — ONDANSETRON HYDROCHLORIDE 8 MG/1
8 TABLET, FILM COATED ORAL EVERY 8 HOURS PRN
Qty: 30 TABLET | Refills: 3 | Status: SHIPPED | OUTPATIENT
Start: 2017-01-01 | End: 2017-01-01

## 2017-01-01 RX ORDER — OXYCODONE HYDROCHLORIDE 5 MG/1
10 TABLET ORAL
Qty: 120 TABLET | Refills: 0 | Status: SHIPPED | OUTPATIENT
Start: 2017-01-01 | End: 2017-01-01

## 2017-01-01 RX ORDER — IBUPROFEN 800 MG/1
800 TABLET ORAL EVERY 8 HOURS PRN
Qty: 15 TABLET | Refills: 0 | Status: SHIPPED | OUTPATIENT
Start: 2017-01-01 | End: 2017-01-01

## 2017-01-01 RX ORDER — HYDROMORPHONE HYDROCHLORIDE 2 MG/1
2 TABLET ORAL EVERY 2 HOUR PRN
Status: DISCONTINUED | OUTPATIENT
Start: 2017-01-01 | End: 2017-01-01

## 2017-01-01 RX ORDER — METOCLOPRAMIDE HYDROCHLORIDE 5 MG/ML
10 INJECTION INTRAMUSCULAR; INTRAVENOUS EVERY 8 HOURS PRN
Status: DISCONTINUED | OUTPATIENT
Start: 2017-01-01 | End: 2017-01-01

## 2017-01-01 RX ORDER — FENTANYL 100 UG/H
1 PATCH TRANSDERMAL
COMMUNITY
End: 2017-01-01

## 2017-01-01 RX ORDER — FENTANYL 50 UG/H
1 PATCH TRANSDERMAL
Qty: 10 PATCH | Refills: 0 | Status: SHIPPED | OUTPATIENT
Start: 2017-01-01 | End: 2017-01-01

## 2017-01-01 RX ORDER — MORPHINE SULFATE 60 MG/1
60 TABLET, FILM COATED, EXTENDED RELEASE ORAL EVERY 12 HOURS SCHEDULED
Qty: 60 TABLET | Refills: 0 | Status: SHIPPED | OUTPATIENT
Start: 2017-01-01 | End: 2017-01-01

## 2017-01-01 RX ORDER — FENTANYL 50 UG/H
1 PATCH TRANSDERMAL
Status: DISCONTINUED | OUTPATIENT
Start: 2017-01-01 | End: 2017-01-01

## 2017-01-01 RX ORDER — HYDROMORPHONE HYDROCHLORIDE 1 MG/ML
0.5 INJECTION, SOLUTION INTRAMUSCULAR; INTRAVENOUS; SUBCUTANEOUS
Status: DISCONTINUED | OUTPATIENT
Start: 2017-01-01 | End: 2017-01-01

## 2017-01-01 RX ORDER — OXYCODONE HYDROCHLORIDE 5 MG/1
TABLET ORAL
Qty: 120 TABLET | Refills: 0 | Status: SHIPPED | OUTPATIENT
Start: 2017-01-01 | End: 2017-01-01

## 2017-01-01 RX ORDER — SENNA AND DOCUSATE SODIUM 50; 8.6 MG/1; MG/1
2 TABLET, FILM COATED ORAL DAILY
Status: DISCONTINUED | OUTPATIENT
Start: 2017-01-01 | End: 2017-01-01

## 2017-01-01 RX ORDER — LACTULOSE 10 G/15ML
20 SOLUTION ORAL 2 TIMES DAILY
Qty: 478 ML | Refills: 3 | Status: SHIPPED | OUTPATIENT
Start: 2017-01-01 | End: 2017-01-01

## 2017-01-01 RX ORDER — LIDOCAINE 4 G/G
PATCH TOPICAL
COMMUNITY
End: 2017-01-01

## 2017-01-01 RX ORDER — MORPHINE SULFATE 30 MG/1
60 TABLET, FILM COATED, EXTENDED RELEASE ORAL
Status: DISCONTINUED | OUTPATIENT
Start: 2017-01-01 | End: 2017-01-01

## 2017-01-01 RX ORDER — HYDROMORPHONE HYDROCHLORIDE 1 MG/ML
INJECTION, SOLUTION INTRAMUSCULAR; INTRAVENOUS; SUBCUTANEOUS EVERY 2 HOUR PRN
Status: DISCONTINUED | OUTPATIENT
Start: 2017-01-01 | End: 2017-01-01

## 2017-01-01 RX ORDER — IPRATROPIUM BROMIDE AND ALBUTEROL SULFATE 2.5; .5 MG/3ML; MG/3ML
3 SOLUTION RESPIRATORY (INHALATION) EVERY 4 HOURS PRN
Status: DISCONTINUED | OUTPATIENT
Start: 2017-01-01 | End: 2017-01-01

## 2017-01-01 RX ORDER — CEFAZOLIN SODIUM 1 G/3ML
INJECTION, POWDER, FOR SOLUTION INTRAMUSCULAR; INTRAVENOUS
Status: DISCONTINUED | OUTPATIENT
Start: 2017-01-01 | End: 2017-01-01 | Stop reason: HOSPADM

## 2017-01-01 RX ORDER — FENTANYL 25 UG/H
1 PATCH TRANSDERMAL
Status: DISCONTINUED | OUTPATIENT
Start: 2017-01-01 | End: 2017-01-01

## 2017-01-01 RX ORDER — DEXTROSE AND SODIUM CHLORIDE 5; .9 G/100ML; G/100ML
INJECTION, SOLUTION INTRAVENOUS CONTINUOUS
Status: DISCONTINUED | OUTPATIENT
Start: 2017-01-01 | End: 2017-01-01

## 2017-01-01 RX ORDER — DEXAMETHASONE SODIUM PHOSPHATE 4 MG/ML
10 VIAL (ML) INJECTION ONCE
Status: COMPLETED | OUTPATIENT
Start: 2017-01-01 | End: 2017-01-01

## 2017-01-01 RX ORDER — ONDANSETRON 2 MG/ML
4 INJECTION INTRAMUSCULAR; INTRAVENOUS EVERY 4 HOURS PRN
Status: DISCONTINUED | OUTPATIENT
Start: 2017-01-01 | End: 2017-01-01 | Stop reason: ALTCHOICE

## 2017-01-01 RX ORDER — MORPHINE SULFATE 30 MG/1
30 TABLET, FILM COATED, EXTENDED RELEASE ORAL EVERY 8 HOURS SCHEDULED
Qty: 90 TABLET | Refills: 0 | Status: SHIPPED | OUTPATIENT
Start: 2017-01-01 | End: 2017-01-01

## 2017-01-01 RX ORDER — ASPIRIN 81 MG/1
81 TABLET ORAL DAILY
Status: DISCONTINUED | OUTPATIENT
Start: 2017-01-01 | End: 2017-01-01

## 2017-01-01 RX ORDER — GABAPENTIN 300 MG/1
300 CAPSULE ORAL NIGHTLY
Qty: 30 CAPSULE | Refills: 3 | Status: SHIPPED | OUTPATIENT
Start: 2017-01-01 | End: 2017-01-01

## 2017-01-01 RX ORDER — METOCLOPRAMIDE HYDROCHLORIDE 5 MG/ML
10 INJECTION INTRAMUSCULAR; INTRAVENOUS EVERY 6 HOURS PRN
Status: DISCONTINUED | OUTPATIENT
Start: 2017-01-01 | End: 2017-01-01

## 2017-01-01 RX ORDER — HYDROMORPHONE HYDROCHLORIDE 1 MG/ML
0.4 INJECTION, SOLUTION INTRAMUSCULAR; INTRAVENOUS; SUBCUTANEOUS EVERY 5 MIN PRN
Status: ACTIVE | OUTPATIENT
Start: 2017-01-01 | End: 2017-01-01

## 2017-01-01 RX ORDER — PROCHLORPERAZINE MALEATE 10 MG
TABLET ORAL
Qty: 385 TABLET | Refills: 3 | Status: ON HOLD | OUTPATIENT
Start: 2017-01-01 | End: 2017-01-01

## 2017-01-01 RX ORDER — DICYCLOMINE HYDROCHLORIDE 10 MG/1
10 CAPSULE ORAL 3 TIMES DAILY PRN
Qty: 90 CAPSULE | Refills: 0 | Status: SHIPPED | OUTPATIENT
Start: 2017-01-01 | End: 2017-01-01

## 2017-01-01 RX ORDER — FENTANYL 75 UG/H
1 PATCH TRANSDERMAL
COMMUNITY
End: 2017-01-01

## 2017-01-01 RX ORDER — DEXAMETHASONE SODIUM PHOSPHATE 10 MG/ML
10 INJECTION, SOLUTION INTRAMUSCULAR; INTRAVENOUS ONCE
Status: COMPLETED | OUTPATIENT
Start: 2017-01-01 | End: 2017-01-01

## 2017-01-01 RX ORDER — MORPHINE SULFATE 30 MG/1
30 TABLET, FILM COATED, EXTENDED RELEASE ORAL DAILY
Status: DISCONTINUED | OUTPATIENT
Start: 2017-01-01 | End: 2017-01-01

## 2017-01-01 RX ORDER — SODIUM CHLORIDE 0.9 % (FLUSH) 0.9 %
10 SYRINGE (ML) INJECTION ONCE
Status: DISCONTINUED | OUTPATIENT
Start: 2017-01-01 | End: 2017-01-01

## 2017-01-01 RX ORDER — ONDANSETRON 4 MG/1
8 TABLET, FILM COATED ORAL EVERY 8 HOURS PRN
Status: DISCONTINUED | OUTPATIENT
Start: 2017-01-01 | End: 2017-01-01

## 2017-01-01 RX ORDER — HYDRALAZINE HYDROCHLORIDE 20 MG/ML
10 INJECTION INTRAMUSCULAR; INTRAVENOUS EVERY 4 HOURS PRN
Status: DISCONTINUED | OUTPATIENT
Start: 2017-01-01 | End: 2017-01-01

## 2017-01-01 RX ORDER — NALOXONE HYDROCHLORIDE 0.4 MG/ML
80 INJECTION, SOLUTION INTRAMUSCULAR; INTRAVENOUS; SUBCUTANEOUS AS NEEDED
Status: ACTIVE | OUTPATIENT
Start: 2017-01-01 | End: 2017-01-01

## 2017-01-01 RX ORDER — DIPHENHYDRAMINE HYDROCHLORIDE 50 MG/ML
12.5 INJECTION INTRAMUSCULAR; INTRAVENOUS AS NEEDED
Status: ACTIVE | OUTPATIENT
Start: 2017-01-01 | End: 2017-01-01

## 2017-01-01 RX ORDER — LACTULOSE 10 G/15ML
SOLUTION ORAL
Qty: 3040 ML | Refills: 5 | Status: SHIPPED | OUTPATIENT
Start: 2017-01-01 | End: 2017-01-01

## 2017-01-01 RX ORDER — FENTANYL 75 UG/H
1 PATCH TRANSDERMAL
Status: DISCONTINUED | OUTPATIENT
Start: 2017-01-01 | End: 2017-01-01

## 2017-01-01 RX ORDER — BISACODYL 10 MG
10 SUPPOSITORY, RECTAL RECTAL
COMMUNITY
End: 2017-01-01

## 2017-01-01 RX ORDER — LACTULOSE 20 G/30ML
20 SOLUTION ORAL 2 TIMES DAILY
COMMUNITY
End: 2017-01-01

## 2017-01-01 RX ORDER — AZITHROMYCIN 250 MG/1
250 TABLET, FILM COATED ORAL DAILY
Status: COMPLETED | OUTPATIENT
Start: 2017-01-01 | End: 2017-01-01

## 2017-01-01 RX ORDER — HYDROMORPHONE HYDROCHLORIDE 4 MG/1
4 TABLET ORAL
Qty: 90 TABLET | Refills: 0 | Status: SHIPPED | OUTPATIENT
Start: 2017-01-01 | End: 2017-01-01 | Stop reason: ALTCHOICE

## 2017-01-01 RX ORDER — DIPHENHYDRAMINE HYDROCHLORIDE 50 MG/ML
25 INJECTION INTRAMUSCULAR; INTRAVENOUS ONCE
Status: COMPLETED | OUTPATIENT
Start: 2017-01-01 | End: 2017-01-01

## 2017-01-01 RX ORDER — SIMVASTATIN 20 MG
TABLET ORAL
Qty: 90 TABLET | Refills: 0 | Status: ON HOLD | OUTPATIENT
Start: 2017-01-01 | End: 2017-01-01

## 2017-01-01 RX ORDER — GABAPENTIN 300 MG/1
300 CAPSULE ORAL 2 TIMES DAILY
Status: DISCONTINUED | OUTPATIENT
Start: 2017-01-01 | End: 2017-01-01

## 2017-01-01 RX ORDER — POTASSIUM CHLORIDE 1500 MG/1
TABLET, FILM COATED, EXTENDED RELEASE ORAL
Qty: 180 TABLET | Refills: 0 | Status: SHIPPED | OUTPATIENT
Start: 2017-01-01 | End: 2017-01-01

## 2017-01-01 RX ORDER — ACYCLOVIR 400 MG/1
400 TABLET ORAL 2 TIMES DAILY
Qty: 60 TABLET | Refills: 11 | Status: ON HOLD | OUTPATIENT
Start: 2017-01-01 | End: 2017-01-01

## 2017-01-01 RX ORDER — HYDROMORPHONE HYDROCHLORIDE 1 MG/ML
2 INJECTION, SOLUTION INTRAMUSCULAR; INTRAVENOUS; SUBCUTANEOUS ONCE
Status: DISCONTINUED | OUTPATIENT
Start: 2017-01-01 | End: 2017-01-01

## 2017-01-01 RX ORDER — POTASSIUM CHLORIDE 20 MEQ/1
40 TABLET, EXTENDED RELEASE ORAL DAILY
Qty: 30 TABLET | Refills: 0 | Status: SHIPPED | OUTPATIENT
Start: 2017-01-01 | End: 2017-01-01

## 2017-01-01 RX ORDER — LACTULOSE 20 G/30ML
20 SOLUTION ORAL 2 TIMES DAILY
Qty: 1 BOTTLE | Refills: 3 | Status: SHIPPED | OUTPATIENT
Start: 2017-01-01 | End: 2017-01-01

## 2017-01-01 RX ORDER — PROCHLORPERAZINE MALEATE 10 MG
10 TABLET ORAL EVERY 6 HOURS PRN
Status: DISCONTINUED | OUTPATIENT
Start: 2017-01-01 | End: 2017-01-01

## 2017-01-01 RX ORDER — ACETAMINOPHEN 500 MG
1000 TABLET ORAL EVERY 6 HOURS PRN
Status: DISCONTINUED | OUTPATIENT
Start: 2017-01-01 | End: 2017-01-01

## 2017-01-01 RX ORDER — LACTULOSE 10 G/15ML
SOLUTION ORAL
Qty: 5676 ML | Refills: 0 | Status: ON HOLD | OUTPATIENT
Start: 2017-01-01 | End: 2017-01-01

## 2017-01-01 RX ORDER — HYDROMORPHONE HYDROCHLORIDE 4 MG/1
4 TABLET ORAL
Status: DISCONTINUED | OUTPATIENT
Start: 2017-01-01 | End: 2017-01-01

## 2017-01-01 RX ORDER — ONDANSETRON 4 MG/1
4 TABLET, ORALLY DISINTEGRATING ORAL EVERY 6 HOURS PRN
Status: DISCONTINUED | OUTPATIENT
Start: 2017-01-01 | End: 2017-01-01

## 2017-01-01 RX ORDER — METHYLPREDNISOLONE 16 MG/1
16 TABLET ORAL DAILY
Qty: 12 TABLET | Refills: 0 | Status: SHIPPED | OUTPATIENT
Start: 2017-01-01 | End: 2017-01-01

## 2017-01-01 RX ORDER — HYDROMORPHONE HYDROCHLORIDE 4 MG/1
4 TABLET ORAL EVERY 4 HOURS PRN
Qty: 60 TABLET | Refills: 0 | Status: SHIPPED | COMMUNITY
Start: 2017-01-01 | End: 2017-01-01 | Stop reason: DRUGHIGH

## 2017-01-01 RX ORDER — MORPHINE SULFATE 30 MG/1
30 TABLET, FILM COATED, EXTENDED RELEASE ORAL EVERY 24 HOURS
Status: DISCONTINUED | OUTPATIENT
Start: 2017-01-01 | End: 2017-01-01

## 2017-01-01 RX ORDER — DIPHENOXYLATE HYDROCHLORIDE AND ATROPINE SULFATE 2.5; .025 MG/1; MG/1
1-2 TABLET ORAL 4 TIMES DAILY PRN
Status: DISCONTINUED | OUTPATIENT
Start: 2017-01-01 | End: 2017-01-01 | Stop reason: SDUPTHER

## 2017-01-01 RX ORDER — ESCITALOPRAM OXALATE 20 MG/1
20 TABLET ORAL DAILY
Status: DISCONTINUED | OUTPATIENT
Start: 2017-01-01 | End: 2017-01-01

## 2017-01-01 RX ORDER — AMLODIPINE BESYLATE 5 MG/1
5 TABLET ORAL DAILY
Status: DISCONTINUED | OUTPATIENT
Start: 2017-01-01 | End: 2017-01-01

## 2017-01-01 RX ORDER — DICYCLOMINE HYDROCHLORIDE 10 MG/1
10 CAPSULE ORAL 3 TIMES DAILY PRN
Status: DISCONTINUED | OUTPATIENT
Start: 2017-01-01 | End: 2017-01-01

## 2017-01-01 RX ORDER — DEXAMETHASONE SODIUM PHOSPHATE 10 MG/ML
4 INJECTION, SOLUTION INTRAMUSCULAR; INTRAVENOUS 4 TIMES DAILY
Status: DISCONTINUED | OUTPATIENT
Start: 2017-01-01 | End: 2017-01-01

## 2017-01-01 RX ORDER — PROMETHAZINE HYDROCHLORIDE AND CODEINE PHOSPHATE 6.25; 1 MG/5ML; MG/5ML
5 SYRUP ORAL EVERY 4 HOURS PRN
Status: DISCONTINUED | OUTPATIENT
Start: 2017-01-01 | End: 2017-01-01

## 2017-01-01 RX ORDER — SENNOSIDES 8.6 MG
17.2 TABLET ORAL NIGHTLY
Status: DISCONTINUED | OUTPATIENT
Start: 2017-01-01 | End: 2017-01-01

## 2017-01-01 RX ORDER — DEXAMETHASONE SODIUM PHOSPHATE 4 MG/ML
2 VIAL (ML) INJECTION DAILY
Status: COMPLETED | OUTPATIENT
Start: 2017-01-01 | End: 2017-01-01

## 2017-01-01 RX ORDER — OXYCODONE HYDROCHLORIDE 10 MG/1
10 TABLET ORAL
Qty: 180 TABLET | Refills: 0 | Status: SHIPPED | OUTPATIENT
Start: 2017-01-01 | End: 2017-01-01

## 2017-01-01 RX ORDER — METHYLPHENIDATE HYDROCHLORIDE 10 MG/1
5 TABLET ORAL 2 TIMES DAILY WITH MEALS
Status: DISCONTINUED | OUTPATIENT
Start: 2017-01-01 | End: 2017-01-01

## 2017-01-01 RX ORDER — ZOLPIDEM TARTRATE 10 MG/1
10 TABLET ORAL NIGHTLY PRN
COMMUNITY
End: 2017-01-01

## 2017-01-01 RX ORDER — OXYCODONE HYDROCHLORIDE 5 MG/1
10 TABLET ORAL
Status: DISCONTINUED | OUTPATIENT
Start: 2017-01-01 | End: 2017-01-01

## 2017-01-01 RX ORDER — SENNOSIDES 8.6 MG
17.2 TABLET ORAL 2 TIMES DAILY
Qty: 120 TABLET | Refills: 0 | Status: ON HOLD | OUTPATIENT
Start: 2017-01-01 | End: 2017-01-01

## 2017-01-01 RX ORDER — FAMOTIDINE 10 MG/ML
20 INJECTION, SOLUTION INTRAVENOUS 2 TIMES DAILY
Status: DISCONTINUED | OUTPATIENT
Start: 2017-01-01 | End: 2017-01-01

## 2017-01-01 RX ORDER — LORAZEPAM 2 MG/ML
1 INJECTION INTRAMUSCULAR EVERY 4 HOURS PRN
Status: DISCONTINUED | OUTPATIENT
Start: 2017-01-01 | End: 2017-01-01

## 2017-01-01 RX ORDER — LIDOCAINE HYDROCHLORIDE AND EPINEPHRINE 10; 10 MG/ML; UG/ML
INJECTION, SOLUTION INFILTRATION; PERINEURAL
Status: COMPLETED
Start: 2017-01-01 | End: 2017-01-01

## 2017-01-01 RX ORDER — CHLORHEXIDINE GLUCONATE 0.12 MG/ML
15 RINSE ORAL
Status: DISCONTINUED | OUTPATIENT
Start: 2017-01-01 | End: 2017-01-01

## 2017-01-01 RX ORDER — LIDOCAINE HYDROCHLORIDE 10 MG/ML
INJECTION, SOLUTION INFILTRATION; PERINEURAL
Status: COMPLETED
Start: 2017-01-01 | End: 2017-01-01

## 2017-01-01 RX ORDER — ARIPIPRAZOLE 15 MG/1
40 TABLET ORAL ONCE
Status: COMPLETED | OUTPATIENT
Start: 2017-01-01 | End: 2017-01-01

## 2017-01-01 RX ORDER — MORPHINE SULFATE 15 MG/1
15 TABLET ORAL
Qty: 90 TABLET | Refills: 0 | Status: SHIPPED | OUTPATIENT
Start: 2017-01-01 | End: 2017-01-01

## 2017-01-01 RX ADMIN — SODIUM CHLORIDE 0.9 % (FLUSH) 10 ML: 0.9 % SYRINGE (ML) INJECTION at 15:24:00

## 2017-01-01 RX ADMIN — OXYCODONE HYDROCHLORIDE 10 MG: 5 TABLET ORAL at 11:16:00

## 2017-01-01 RX ADMIN — DRONABINOL 2.5 MG: 2.5 CAPSULE ORAL at 20:00:00

## 2017-01-01 RX ADMIN — FAMOTIDINE 20 MG: 10 INJECTION, SOLUTION INTRAVENOUS at 09:55:00

## 2017-01-01 RX ADMIN — FAMOTIDINE 20 MG: 10 INJECTION, SOLUTION INTRAVENOUS at 09:30:00

## 2017-01-01 RX ADMIN — HYDROMORPHONE HYDROCHLORIDE 2 MG: 1 INJECTION, SOLUTION INTRAMUSCULAR; INTRAVENOUS; SUBCUTANEOUS at 10:47:00

## 2017-01-01 RX ADMIN — SODIUM CHLORIDE 0.9 % (FLUSH) 10 ML: 0.9 % SYRINGE (ML) INJECTION at 15:52:00

## 2017-01-01 RX ADMIN — HYDROMORPHONE HYDROCHLORIDE 1 MG: 1 INJECTION, SOLUTION INTRAMUSCULAR; INTRAVENOUS; SUBCUTANEOUS at 15:20:00

## 2017-01-01 RX ADMIN — SODIUM CHLORIDE: 9 INJECTION, SOLUTION INTRAVENOUS at 14:21:00

## 2017-01-01 RX ADMIN — SENNOSIDES 17.2 MG: 8.6 MG TABLET ORAL at 09:03:00

## 2017-01-01 RX ADMIN — HEPARIN SODIUM 5000 UNITS: 5000 INJECTION, SOLUTION INTRAVENOUS; SUBCUTANEOUS at 05:27:00

## 2017-01-01 RX ADMIN — ACETAMINOPHEN 650 MG: 325 TABLET ORAL at 10:08:00

## 2017-01-01 RX ADMIN — POLYETHYLENE GLYCOL 3350 17 G: 17 POWDER, FOR SOLUTION ORAL at 09:02:00

## 2017-01-01 RX ADMIN — SENNOSIDES 17.2 MG: 8.6 MG TABLET ORAL at 22:00:00

## 2017-01-01 RX ADMIN — ESCITALOPRAM OXALATE 10 MG: 10 TABLET ORAL at 10:54:00

## 2017-01-01 RX ADMIN — SODIUM CHLORIDE 0.9 % (FLUSH) 10 ML: 0.9 % SYRINGE (ML) INJECTION at 15:40:00

## 2017-01-01 RX ADMIN — LABETALOL HYDROCHLORIDE 10 MG: 5 INJECTION, SOLUTION INTRAVENOUS at 22:38:00

## 2017-01-01 RX ADMIN — HYDROMORPHONE HYDROCHLORIDE 1 MG: 1 INJECTION, SOLUTION INTRAMUSCULAR; INTRAVENOUS; SUBCUTANEOUS at 20:41:00

## 2017-01-01 RX ADMIN — SODIUM CHLORIDE 0.9 % (FLUSH) 10 ML: 0.9 % SYRINGE (ML) INJECTION at 11:49:00

## 2017-01-01 RX ADMIN — HYDROMORPHONE HYDROCHLORIDE 2 MG: 1 INJECTION, SOLUTION INTRAMUSCULAR; INTRAVENOUS; SUBCUTANEOUS at 13:58:00

## 2017-01-01 RX ADMIN — GABAPENTIN 300 MG: 300 CAPSULE ORAL at 08:54:00

## 2017-01-01 RX ADMIN — GABAPENTIN 300 MG: 300 CAPSULE ORAL at 08:24:00

## 2017-01-01 RX ADMIN — LUBIPROSTONE 24 MCG: 24 CAPSULE, GELATIN COATED ORAL at 19:00:00

## 2017-01-01 RX ADMIN — LUBIPROSTONE 24 MCG: 24 CAPSULE, GELATIN COATED ORAL at 16:48:00

## 2017-01-01 RX ADMIN — HYDROMORPHONE HYDROCHLORIDE 2 MG: 1 INJECTION, SOLUTION INTRAMUSCULAR; INTRAVENOUS; SUBCUTANEOUS at 12:45:00

## 2017-01-01 RX ADMIN — DRONABINOL 2.5 MG: 2.5 CAPSULE ORAL at 17:56:00

## 2017-01-01 RX ADMIN — AZITHROMYCIN 250 MG: 250 TABLET, FILM COATED ORAL at 08:47:00

## 2017-01-01 RX ADMIN — HYDROMORPHONE HYDROCHLORIDE 1 MG: 1 INJECTION, SOLUTION INTRAMUSCULAR; INTRAVENOUS; SUBCUTANEOUS at 09:45:00

## 2017-01-01 RX ADMIN — DRONABINOL 2.5 MG: 2.5 CAPSULE ORAL at 06:42:00

## 2017-01-01 RX ADMIN — SODIUM CHLORIDE 1000 ML: 9 INJECTION, SOLUTION INTRAVENOUS at 13:15:00

## 2017-01-01 RX ADMIN — SODIUM CHLORIDE 0.9 % (FLUSH) 10 ML: 0.9 % SYRINGE (ML) INJECTION at 11:15:00

## 2017-01-01 RX ADMIN — BISACODYL 10 MG: 10 MG SUPPOSITORY, RECTAL RECTAL at 15:08:00

## 2017-01-01 RX ADMIN — ESCITALOPRAM OXALATE 10 MG: 10 TABLET ORAL at 08:55:00

## 2017-01-01 RX ADMIN — HYDROMORPHONE HYDROCHLORIDE 1 MG: 1 INJECTION, SOLUTION INTRAMUSCULAR; INTRAVENOUS; SUBCUTANEOUS at 09:55:00

## 2017-01-01 RX ADMIN — SENNOSIDES 17.2 MG: 8.6 MG TABLET ORAL at 21:07:00

## 2017-01-01 RX ADMIN — SENNOSIDES 17.2 MG: 8.6 MG TABLET ORAL at 20:39:00

## 2017-01-01 RX ADMIN — DOCUSATE SODIUM 100 MG: 100 CAPSULE, LIQUID FILLED ORAL at 20:16:00

## 2017-01-01 RX ADMIN — GABAPENTIN 300 MG: 300 CAPSULE ORAL at 20:41:00

## 2017-01-01 RX ADMIN — GABAPENTIN 300 MG: 300 CAPSULE ORAL at 09:43:00

## 2017-01-01 RX ADMIN — IPRATROPIUM BROMIDE AND ALBUTEROL SULFATE 3 ML: 2.5; .5 SOLUTION RESPIRATORY (INHALATION) at 13:47:00

## 2017-01-01 RX ADMIN — SENNOSIDES 17.2 MG: 8.6 MG TABLET ORAL at 08:54:00

## 2017-01-01 RX ADMIN — POLYETHYLENE GLYCOL 3350 17 G: 17 POWDER, FOR SOLUTION ORAL at 16:26:00

## 2017-01-01 RX ADMIN — AZITHROMYCIN 250 MG: 250 TABLET, FILM COATED ORAL at 08:24:00

## 2017-01-01 RX ADMIN — SODIUM CHLORIDE 0.9 % (FLUSH) 10 ML: 0.9 % SYRINGE (ML) INJECTION at 15:00:00

## 2017-01-01 RX ADMIN — ESCITALOPRAM OXALATE 10 MG: 10 TABLET ORAL at 08:54:00

## 2017-01-01 RX ADMIN — HYDROMORPHONE HYDROCHLORIDE 1 MG: 1 INJECTION, SOLUTION INTRAMUSCULAR; INTRAVENOUS; SUBCUTANEOUS at 08:57:00

## 2017-01-01 RX ADMIN — HEPARIN SODIUM 5000 UNITS: 5000 INJECTION, SOLUTION INTRAVENOUS; SUBCUTANEOUS at 22:38:00

## 2017-01-01 RX ADMIN — HYDROMORPHONE HYDROCHLORIDE 2 MG: 1 INJECTION, SOLUTION INTRAMUSCULAR; INTRAVENOUS; SUBCUTANEOUS at 16:00:00

## 2017-01-01 RX ADMIN — LUBIPROSTONE 24 MCG: 24 CAPSULE, GELATIN COATED ORAL at 16:53:00

## 2017-01-01 RX ADMIN — SENNOSIDES 17.2 MG: 8.6 MG TABLET ORAL at 08:59:00

## 2017-01-01 RX ADMIN — DOCUSATE SODIUM 100 MG: 100 CAPSULE, LIQUID FILLED ORAL at 20:41:00

## 2017-01-01 RX ADMIN — SODIUM CHLORIDE: 9 INJECTION, SOLUTION INTRAVENOUS at 21:41:00

## 2017-01-01 RX ADMIN — POLYETHYLENE GLYCOL 3350 17 G: 17 POWDER, FOR SOLUTION ORAL at 08:54:00

## 2017-01-01 RX ADMIN — ACETAMINOPHEN 650 MG: 325 TABLET ORAL at 08:55:00

## 2017-01-01 RX ADMIN — SODIUM CHLORIDE 0.9 % (FLUSH) 10 ML: 0.9 % SYRINGE (ML) INJECTION at 10:00:00

## 2017-01-01 RX ADMIN — IPRATROPIUM BROMIDE AND ALBUTEROL SULFATE 3 ML: 2.5; .5 SOLUTION RESPIRATORY (INHALATION) at 08:10:00

## 2017-01-01 RX ADMIN — HYDROMORPHONE HYDROCHLORIDE 2 MG: 1 INJECTION, SOLUTION INTRAMUSCULAR; INTRAVENOUS; SUBCUTANEOUS at 12:20:00

## 2017-01-01 RX ADMIN — DEXTROSE MONOHYDRATE: 50 INJECTION, SOLUTION INTRAVENOUS at 08:54:00

## 2017-01-01 RX ADMIN — HYDROMORPHONE HYDROCHLORIDE 2 MG: 1 INJECTION, SOLUTION INTRAMUSCULAR; INTRAVENOUS; SUBCUTANEOUS at 12:08:00

## 2017-01-01 RX ADMIN — SENNOSIDES 17.2 MG: 8.6 MG TABLET ORAL at 20:16:00

## 2017-01-01 RX ADMIN — OXYCODONE HYDROCHLORIDE 10 MG: 5 TABLET ORAL at 20:29:00

## 2017-01-01 RX ADMIN — FAMOTIDINE 20 MG: 10 INJECTION, SOLUTION INTRAVENOUS at 09:46:00

## 2017-01-01 RX ADMIN — GABAPENTIN 300 MG: 300 CAPSULE ORAL at 21:45:00

## 2017-01-01 RX ADMIN — AZITHROMYCIN 500 MG: 250 TABLET, FILM COATED ORAL at 16:26:00

## 2017-01-01 RX ADMIN — HEPARIN SODIUM 5000 UNITS: 5000 INJECTION, SOLUTION INTRAVENOUS; SUBCUTANEOUS at 15:08:00

## 2017-01-01 RX ADMIN — ACETAMINOPHEN 650 MG: 325 TABLET ORAL at 10:00:00

## 2017-01-01 RX ADMIN — Medication 10 MG: at 11:40:00

## 2017-01-01 RX ADMIN — GABAPENTIN 300 MG: 300 CAPSULE ORAL at 08:47:00

## 2017-01-01 RX ADMIN — ACETAMINOPHEN 650 MG: 325 TABLET ORAL at 09:30:00

## 2017-01-01 RX ADMIN — DRONABINOL 2.5 MG: 2.5 CAPSULE ORAL at 06:14:00

## 2017-01-01 RX ADMIN — SODIUM CHLORIDE 0.9 % (FLUSH) 10 ML: 0.9 % SYRINGE (ML) INJECTION at 15:39:00

## 2017-01-01 RX ADMIN — SODIUM CHLORIDE 0.9 % (FLUSH) 10 ML: 0.9 % SYRINGE (ML) INJECTION at 11:20:00

## 2017-01-01 RX ADMIN — SODIUM CHLORIDE 0.9 % (FLUSH) 10 ML: 0.9 % SYRINGE (ML) INJECTION at 11:36:00

## 2017-01-01 RX ADMIN — POLYETHYLENE GLYCOL 3350 17 G: 17 POWDER, FOR SOLUTION ORAL at 08:47:00

## 2017-01-01 RX ADMIN — SODIUM CHLORIDE 0.9 % (FLUSH) 10 ML: 0.9 % SYRINGE (ML) INJECTION at 13:50:00

## 2017-01-01 RX ADMIN — HYDROMORPHONE HYDROCHLORIDE 2 MG: 1 INJECTION, SOLUTION INTRAMUSCULAR; INTRAVENOUS; SUBCUTANEOUS at 12:03:00

## 2017-01-01 RX ADMIN — IPRATROPIUM BROMIDE AND ALBUTEROL SULFATE 3 ML: 2.5; .5 SOLUTION RESPIRATORY (INHALATION) at 23:29:00

## 2017-01-01 RX ADMIN — ACETAMINOPHEN 650 MG: 325 TABLET ORAL at 09:45:00

## 2017-01-01 RX ADMIN — DOCUSATE SODIUM 100 MG: 100 CAPSULE, LIQUID FILLED ORAL at 21:00:00

## 2017-01-01 RX ADMIN — SODIUM CHLORIDE 0.9 % (FLUSH) 10 ML: 0.9 % SYRINGE (ML) INJECTION at 09:30:00

## 2017-01-01 RX ADMIN — SODIUM CHLORIDE 0.9 % (FLUSH) 10 ML: 0.9 % SYRINGE (ML) INJECTION at 14:25:00

## 2017-01-01 RX ADMIN — POTASSIUM CHLORIDE 40 MEQ: 29.8 INJECTION INTRAVENOUS at 10:52:00

## 2017-01-01 RX ADMIN — FAMOTIDINE 20 MG: 10 INJECTION, SOLUTION INTRAVENOUS at 10:15:00

## 2017-01-01 RX ADMIN — SODIUM CHLORIDE: 9 INJECTION, SOLUTION INTRAVENOUS at 16:00:00

## 2017-01-01 RX ADMIN — SODIUM CHLORIDE: 9 INJECTION, SOLUTION INTRAVENOUS at 16:55:00

## 2017-01-01 RX ADMIN — HYDROMORPHONE HYDROCHLORIDE 2 MG: 1 INJECTION, SOLUTION INTRAMUSCULAR; INTRAVENOUS; SUBCUTANEOUS at 09:45:00

## 2017-01-01 RX ADMIN — LUBIPROSTONE 24 MCG: 24 CAPSULE, GELATIN COATED ORAL at 08:53:00

## 2017-01-01 RX ADMIN — FAMOTIDINE 20 MG: 10 INJECTION, SOLUTION INTRAVENOUS at 10:00:00

## 2017-01-01 RX ADMIN — SODIUM CHLORIDE 0.9 % (FLUSH) 10 ML: 0.9 % SYRINGE (ML) INJECTION at 09:49:00

## 2017-01-01 RX ADMIN — SENNOSIDES 17.2 MG: 8.6 MG TABLET ORAL at 20:41:00

## 2017-01-01 RX ADMIN — ESCITALOPRAM OXALATE 10 MG: 10 TABLET ORAL at 08:47:00

## 2017-01-01 RX ADMIN — SODIUM CHLORIDE 0.9 % (FLUSH) 10 ML: 0.9 % SYRINGE (ML) INJECTION at 15:05:00

## 2017-01-01 RX ADMIN — AZITHROMYCIN 250 MG: 250 TABLET, FILM COATED ORAL at 09:03:00

## 2017-01-01 RX ADMIN — SENNOSIDES 17.2 MG: 8.6 MG TABLET ORAL at 08:47:00

## 2017-01-01 RX ADMIN — SODIUM CHLORIDE 0.9 % (FLUSH) 10 ML: 0.9 % SYRINGE (ML) INJECTION at 12:25:00

## 2017-01-01 RX ADMIN — HYDROMORPHONE HYDROCHLORIDE 2 MG: 1 INJECTION, SOLUTION INTRAMUSCULAR; INTRAVENOUS; SUBCUTANEOUS at 10:30:00

## 2017-01-01 RX ADMIN — DOCUSATE SODIUM 100 MG: 100 CAPSULE, LIQUID FILLED ORAL at 08:53:00

## 2017-01-01 RX ADMIN — FAMOTIDINE 20 MG: 10 INJECTION, SOLUTION INTRAVENOUS at 12:40:00

## 2017-01-01 RX ADMIN — AMLODIPINE BESYLATE 5 MG: 5 TABLET ORAL at 08:54:00

## 2017-01-01 RX ADMIN — SODIUM CHLORIDE 0.9 % (FLUSH) 10 ML: 0.9 % SYRINGE (ML) INJECTION at 15:30:00

## 2017-01-01 RX ADMIN — SODIUM CHLORIDE 0.9 % (FLUSH) 10 ML: 0.9 % SYRINGE (ML) INJECTION at 10:45:00

## 2017-01-01 RX ADMIN — SODIUM CHLORIDE 0.9 % (FLUSH) 10 ML: 0.9 % SYRINGE (ML) INJECTION at 17:30:00

## 2017-01-01 RX ADMIN — GABAPENTIN 300 MG: 300 CAPSULE ORAL at 20:17:00

## 2017-01-01 RX ADMIN — LUBIPROSTONE 24 MCG: 24 CAPSULE, GELATIN COATED ORAL at 09:43:00

## 2017-01-01 RX ADMIN — SODIUM CHLORIDE 0.9 % (FLUSH) 10 ML: 0.9 % SYRINGE (ML) INJECTION at 14:50:00

## 2017-01-01 RX ADMIN — HYDROMORPHONE HYDROCHLORIDE 1 MG: 1 INJECTION, SOLUTION INTRAMUSCULAR; INTRAVENOUS; SUBCUTANEOUS at 12:48:00

## 2017-01-01 RX ADMIN — ESCITALOPRAM OXALATE 10 MG: 10 TABLET ORAL at 08:23:00

## 2017-01-01 RX ADMIN — DRONABINOL 2.5 MG: 2.5 CAPSULE ORAL at 18:15:00

## 2017-01-01 RX ADMIN — DOCUSATE SODIUM 100 MG: 100 CAPSULE, LIQUID FILLED ORAL at 21:45:00

## 2017-01-01 RX ADMIN — DOCUSATE SODIUM 100 MG: 100 CAPSULE, LIQUID FILLED ORAL at 08:54:00

## 2017-01-01 RX ADMIN — FENTANYL 1 PATCH: 75 PATCH TRANSDERMAL at 16:22:00

## 2017-01-01 RX ADMIN — SENNOSIDES 17.2 MG: 8.6 MG TABLET ORAL at 10:54:00

## 2017-01-01 RX ADMIN — SODIUM CHLORIDE 0.9 % (FLUSH) 10 ML: 0.9 % SYRINGE (ML) INJECTION at 13:20:00

## 2017-01-01 RX ADMIN — AMLODIPINE BESYLATE 5 MG: 5 TABLET ORAL at 10:53:00

## 2017-01-01 RX ADMIN — SENNOSIDES 17.2 MG: 8.6 MG TABLET ORAL at 21:45:00

## 2017-01-01 RX ADMIN — SODIUM CHLORIDE 0.9 % (FLUSH) 10 ML: 0.9 % SYRINGE (ML) INJECTION at 14:42:00

## 2017-01-01 RX ADMIN — GABAPENTIN 300 MG: 300 CAPSULE ORAL at 20:30:00

## 2017-01-01 RX ADMIN — FAMOTIDINE 20 MG: 10 INJECTION, SOLUTION INTRAVENOUS at 09:44:00

## 2017-01-01 RX ADMIN — POLYETHYLENE GLYCOL 3350 17 G: 17 POWDER, FOR SOLUTION ORAL at 09:41:00

## 2017-01-01 RX ADMIN — DOCUSATE SODIUM 100 MG: 100 CAPSULE, LIQUID FILLED ORAL at 09:03:00

## 2017-01-01 RX ADMIN — SENNOSIDES 17.2 MG: 8.6 MG TABLET ORAL at 20:00:00

## 2017-01-01 RX ADMIN — DOCUSATE SODIUM 100 MG: 100 CAPSULE, LIQUID FILLED ORAL at 20:39:00

## 2017-01-01 RX ADMIN — GABAPENTIN 300 MG: 300 CAPSULE ORAL at 20:00:00

## 2017-01-01 RX ADMIN — HYDROMORPHONE HYDROCHLORIDE 1 MG: 1 INJECTION, SOLUTION INTRAMUSCULAR; INTRAVENOUS; SUBCUTANEOUS at 10:12:00

## 2017-01-01 RX ADMIN — DEXTROSE MONOHYDRATE: 50 INJECTION, SOLUTION INTRAVENOUS at 20:33:00

## 2017-01-01 RX ADMIN — POLYETHYLENE GLYCOL 3350 17 G: 17 POWDER, FOR SOLUTION ORAL at 08:51:00

## 2017-01-01 RX ADMIN — LUBIPROSTONE 24 MCG: 24 CAPSULE, GELATIN COATED ORAL at 17:34:00

## 2017-01-01 RX ADMIN — ACETAMINOPHEN 650 MG: 325 TABLET ORAL at 10:05:00

## 2017-01-01 RX ADMIN — DEXTROSE MONOHYDRATE: 50 INJECTION, SOLUTION INTRAVENOUS at 17:14:00

## 2017-01-01 RX ADMIN — HYDROMORPHONE HYDROCHLORIDE 1 MG: 1 INJECTION, SOLUTION INTRAMUSCULAR; INTRAVENOUS; SUBCUTANEOUS at 11:59:00

## 2017-01-01 RX ADMIN — ACETAMINOPHEN 650 MG: 325 TABLET ORAL at 12:17:00

## 2017-01-01 RX ADMIN — FAMOTIDINE 20 MG: 10 INJECTION, SOLUTION INTRAVENOUS at 10:05:00

## 2017-01-01 RX ADMIN — SODIUM CHLORIDE: 9 INJECTION, SOLUTION INTRAVENOUS at 18:14:00

## 2017-01-01 RX ADMIN — DOCUSATE SODIUM 100 MG: 100 CAPSULE, LIQUID FILLED ORAL at 08:24:00

## 2017-01-01 RX ADMIN — AMLODIPINE BESYLATE 5 MG: 5 TABLET ORAL at 15:08:00

## 2017-01-01 RX ADMIN — SODIUM CHLORIDE 0.9 % (FLUSH) 10 ML: 0.9 % SYRINGE (ML) INJECTION at 12:50:00

## 2017-01-01 RX ADMIN — SODIUM CHLORIDE: 9 INJECTION, SOLUTION INTRAVENOUS at 07:40:00

## 2017-01-01 RX ADMIN — HYDROMORPHONE HYDROCHLORIDE 1 MG: 1 INJECTION, SOLUTION INTRAMUSCULAR; INTRAVENOUS; SUBCUTANEOUS at 09:36:00

## 2017-01-01 RX ADMIN — HYDROMORPHONE HYDROCHLORIDE 1 MG: 1 INJECTION, SOLUTION INTRAMUSCULAR; INTRAVENOUS; SUBCUTANEOUS at 16:25:00

## 2017-01-01 RX ADMIN — AZITHROMYCIN 250 MG: 250 TABLET, FILM COATED ORAL at 08:53:00

## 2017-01-01 RX ADMIN — HEPARIN SODIUM 5000 UNITS: 5000 INJECTION, SOLUTION INTRAVENOUS; SUBCUTANEOUS at 14:00:00

## 2017-01-01 RX ADMIN — SODIUM CHLORIDE 0.9 % (FLUSH) 10 ML: 0.9 % SYRINGE (ML) INJECTION at 14:05:00

## 2017-01-01 RX ADMIN — LUBIPROSTONE 24 MCG: 24 CAPSULE, GELATIN COATED ORAL at 17:38:00

## 2017-01-01 RX ADMIN — HYDROMORPHONE HYDROCHLORIDE 1 MG: 1 INJECTION, SOLUTION INTRAMUSCULAR; INTRAVENOUS; SUBCUTANEOUS at 10:05:00

## 2017-01-01 RX ADMIN — FAMOTIDINE 20 MG: 10 INJECTION, SOLUTION INTRAVENOUS at 10:01:00

## 2017-01-01 RX ADMIN — DOCUSATE SODIUM 100 MG: 100 CAPSULE, LIQUID FILLED ORAL at 20:00:00

## 2017-01-01 RX ADMIN — FENTANYL 1 PATCH: 75 PATCH TRANSDERMAL at 16:38:00

## 2017-01-01 RX ADMIN — HYDROMORPHONE HYDROCHLORIDE 1 MG: 1 INJECTION, SOLUTION INTRAMUSCULAR; INTRAVENOUS; SUBCUTANEOUS at 11:15:00

## 2017-01-01 RX ADMIN — SODIUM CHLORIDE: 9 INJECTION, SOLUTION INTRAVENOUS at 10:55:00

## 2017-01-01 RX ADMIN — GABAPENTIN 300 MG: 300 CAPSULE ORAL at 09:00:00

## 2017-01-01 RX ADMIN — LUBIPROSTONE 24 MCG: 24 CAPSULE, GELATIN COATED ORAL at 08:24:00

## 2017-01-01 RX ADMIN — POLYETHYLENE GLYCOL 3350 17 G: 17 POWDER, FOR SOLUTION ORAL at 10:53:00

## 2017-01-01 RX ADMIN — LUBIPROSTONE 24 MCG: 24 CAPSULE, GELATIN COATED ORAL at 09:02:00

## 2017-01-01 RX ADMIN — AMLODIPINE BESYLATE 5 MG: 5 TABLET ORAL at 09:43:00

## 2017-01-01 RX ADMIN — FAMOTIDINE 20 MG: 10 INJECTION, SOLUTION INTRAVENOUS at 10:10:00

## 2017-01-01 RX ADMIN — SODIUM CHLORIDE 0.9 % (FLUSH) 10 ML: 0.9 % SYRINGE (ML) INJECTION at 14:21:00

## 2017-01-01 RX ADMIN — HYDROMORPHONE HYDROCHLORIDE 2 MG: 1 INJECTION, SOLUTION INTRAMUSCULAR; INTRAVENOUS; SUBCUTANEOUS at 10:40:00

## 2017-01-01 RX ADMIN — SENNOSIDES 17.2 MG: 8.6 MG TABLET ORAL at 08:24:00

## 2017-01-01 RX ADMIN — SODIUM CHLORIDE 0.9 % (FLUSH) 10 ML: 0.9 % SYRINGE (ML) INJECTION at 09:45:00

## 2017-01-01 RX ADMIN — POTASSIUM CHLORIDE 40 MEQ: 20 TABLET, EXTENDED RELEASE ORAL at 08:54:00

## 2017-01-01 RX ADMIN — HYDROMORPHONE HYDROCHLORIDE 1 MG: 1 INJECTION, SOLUTION INTRAMUSCULAR; INTRAVENOUS; SUBCUTANEOUS at 10:57:00

## 2017-01-01 RX ADMIN — LUBIPROSTONE 24 MCG: 24 CAPSULE, GELATIN COATED ORAL at 17:33:00

## 2017-01-01 RX ADMIN — ACETAMINOPHEN 650 MG: 325 TABLET ORAL at 09:49:00

## 2017-01-01 RX ADMIN — OXYCODONE HYDROCHLORIDE 10 MG: 5 TABLET ORAL at 11:04:00

## 2017-01-01 RX ADMIN — SODIUM CHLORIDE 0.9 % (FLUSH) 10 ML: 0.9 % SYRINGE (ML) INJECTION at 12:00:00

## 2017-01-01 RX ADMIN — HEPARIN SODIUM 5000 UNITS: 5000 INJECTION, SOLUTION INTRAVENOUS; SUBCUTANEOUS at 05:28:00

## 2017-01-01 RX ADMIN — DRONABINOL 2.5 MG: 2.5 CAPSULE ORAL at 16:48:00

## 2017-01-01 RX ADMIN — ARIPIPRAZOLE 40 MEQ: 15 TABLET ORAL at 13:41:00

## 2017-01-01 RX ADMIN — GABAPENTIN 300 MG: 300 CAPSULE ORAL at 09:03:00

## 2017-01-01 RX ADMIN — HEPARIN SODIUM 5000 UNITS: 5000 INJECTION, SOLUTION INTRAVENOUS; SUBCUTANEOUS at 21:41:00

## 2017-01-01 RX ADMIN — SODIUM CHLORIDE: 9 INJECTION, SOLUTION INTRAVENOUS at 10:15:00

## 2017-01-01 RX ADMIN — IPRATROPIUM BROMIDE AND ALBUTEROL SULFATE 3 ML: 2.5; .5 SOLUTION RESPIRATORY (INHALATION) at 20:47:00

## 2017-01-01 RX ADMIN — ACETAMINOPHEN 650 MG: 325 TABLET ORAL at 09:34:00

## 2017-01-01 RX ADMIN — DOCUSATE SODIUM 100 MG: 100 CAPSULE, LIQUID FILLED ORAL at 10:54:00

## 2017-01-01 RX ADMIN — SODIUM CHLORIDE 0.9 % (FLUSH) 10 ML: 0.9 % SYRINGE (ML) INJECTION at 14:35:00

## 2017-01-01 RX ADMIN — POTASSIUM CHLORIDE 40 MEQ: 29.8 INJECTION INTRAVENOUS at 09:31:00

## 2017-01-01 RX ADMIN — OXYCODONE HYDROCHLORIDE 10 MG: 5 TABLET ORAL at 18:28:00

## 2017-01-01 RX ADMIN — DOCUSATE SODIUM 100 MG: 100 CAPSULE, LIQUID FILLED ORAL at 08:47:00

## 2017-01-01 RX ADMIN — HYDROMORPHONE HYDROCHLORIDE 2 MG: 1 INJECTION, SOLUTION INTRAMUSCULAR; INTRAVENOUS; SUBCUTANEOUS at 13:30:00

## 2017-01-01 RX ADMIN — SODIUM CHLORIDE 0.9 % (FLUSH) 10 ML: 0.9 % SYRINGE (ML) INJECTION at 11:35:00

## 2017-01-01 RX ADMIN — Medication 10 MG: at 10:25:00

## 2017-01-01 RX ADMIN — GABAPENTIN 300 MG: 300 CAPSULE ORAL at 20:39:00

## 2017-01-01 RX ADMIN — HYDROMORPHONE HYDROCHLORIDE 1 MG: 1 INJECTION, SOLUTION INTRAMUSCULAR; INTRAVENOUS; SUBCUTANEOUS at 10:58:00

## 2017-01-01 RX ADMIN — SODIUM CHLORIDE 0.9 % (FLUSH) 10 ML: 0.9 % SYRINGE (ML) INJECTION at 10:15:00

## 2017-01-01 RX ADMIN — ESCITALOPRAM OXALATE 10 MG: 10 TABLET ORAL at 09:03:00

## 2017-01-01 RX ADMIN — DRONABINOL 2.5 MG: 2.5 CAPSULE ORAL at 06:17:00

## 2017-01-01 RX ADMIN — GABAPENTIN 300 MG: 300 CAPSULE ORAL at 22:00:00

## 2017-01-01 RX ADMIN — SODIUM CHLORIDE: 9 INJECTION, SOLUTION INTRAVENOUS at 05:26:00

## 2017-01-01 RX ADMIN — SODIUM CHLORIDE 0.9 % (FLUSH) 10 ML: 0.9 % SYRINGE (ML) INJECTION at 14:00:00

## 2017-01-01 RX ADMIN — DOCUSATE SODIUM 100 MG: 100 CAPSULE, LIQUID FILLED ORAL at 21:07:00

## 2017-01-01 RX ADMIN — FAMOTIDINE 20 MG: 10 INJECTION, SOLUTION INTRAVENOUS at 10:52:00

## 2017-01-01 RX ADMIN — SODIUM CHLORIDE: 9 INJECTION, SOLUTION INTRAVENOUS at 12:00:00

## 2017-01-01 RX ADMIN — ACETAMINOPHEN 650 MG: 325 TABLET ORAL at 10:23:00

## 2017-01-01 RX ADMIN — LUBIPROSTONE 24 MCG: 24 CAPSULE, GELATIN COATED ORAL at 08:47:00

## 2017-01-01 RX ADMIN — LUBIPROSTONE 24 MCG: 24 CAPSULE, GELATIN COATED ORAL at 10:53:00

## 2017-01-01 RX ADMIN — ATROPINE SULFATE 0.4 MG: 0.4 MG/ML AMPUL (ML) INJECTION at 13:15:00

## 2017-01-01 RX ADMIN — SODIUM CHLORIDE 0.9 % (FLUSH) 10 ML: 0.9 % SYRINGE (ML) INJECTION at 12:35:00

## 2017-01-01 RX ADMIN — SODIUM CHLORIDE 0.9 % (FLUSH) 10 ML: 0.9 % SYRINGE (ML) INJECTION at 10:50:00

## 2017-01-01 RX ADMIN — GABAPENTIN 300 MG: 300 CAPSULE ORAL at 21:07:00

## 2017-01-01 RX ADMIN — ACETAMINOPHEN 650 MG: 325 TABLET ORAL at 16:13:00

## 2017-01-01 RX ADMIN — SENNOSIDES 17.2 MG: 8.6 MG TABLET ORAL at 09:42:00

## 2017-01-01 RX ADMIN — ESCITALOPRAM OXALATE 10 MG: 10 TABLET ORAL at 09:43:00

## 2017-01-01 RX ADMIN — DRONABINOL 2.5 MG: 2.5 CAPSULE ORAL at 20:38:00

## 2017-01-01 RX ADMIN — ONDANSETRON 4 MG: 2 INJECTION INTRAMUSCULAR; INTRAVENOUS at 18:14:00

## 2017-01-01 RX ADMIN — SODIUM CHLORIDE: 9 INJECTION, SOLUTION INTRAVENOUS at 13:22:00

## 2017-01-01 RX ADMIN — DRONABINOL 2.5 MG: 2.5 CAPSULE ORAL at 06:47:00

## 2017-01-01 RX ADMIN — POLYETHYLENE GLYCOL 3350 17 G: 17 POWDER, FOR SOLUTION ORAL at 08:23:00

## 2017-01-01 RX ADMIN — SODIUM CHLORIDE 0.9 % (FLUSH) 10 ML: 0.9 % SYRINGE (ML) INJECTION at 11:50:00

## 2017-01-01 RX ADMIN — DOCUSATE SODIUM 100 MG: 100 CAPSULE, LIQUID FILLED ORAL at 09:42:00

## 2017-01-01 RX ADMIN — POTASSIUM CHLORIDE 40 MEQ: 20 TABLET, EXTENDED RELEASE ORAL at 16:52:00

## 2017-01-01 RX ADMIN — LUBIPROSTONE 24 MCG: 24 CAPSULE, GELATIN COATED ORAL at 08:54:00

## 2017-01-01 RX ADMIN — FAMOTIDINE 20 MG: 10 INJECTION, SOLUTION INTRAVENOUS at 09:03:00

## 2017-01-01 RX ADMIN — HYDROMORPHONE HYDROCHLORIDE 1 MG: 1 INJECTION, SOLUTION INTRAMUSCULAR; INTRAVENOUS; SUBCUTANEOUS at 10:55:00

## 2017-01-01 RX ADMIN — FENTANYL 1 PATCH: 75 PATCH TRANSDERMAL at 17:37:00

## 2017-01-01 RX ADMIN — DEXTROSE MONOHYDRATE: 50 INJECTION, SOLUTION INTRAVENOUS at 15:31:00

## 2017-01-01 RX ADMIN — SODIUM CHLORIDE: 9 INJECTION, SOLUTION INTRAVENOUS at 08:46:00

## 2017-01-01 RX ADMIN — SODIUM CHLORIDE: 9 INJECTION, SOLUTION INTRAVENOUS at 11:40:00

## 2017-01-01 SDOH — SOCIAL STABILITY - SOCIAL INSECURITY: DEPENDENT RELATIVE NEEDING CARE AT HOME: Z63.6

## 2017-01-03 NOTE — PROGRESS NOTES
Pt having similar symptoms to his pancreatitis bout he had 4months ago. He rates his abdominal pain about a 2. Dr. Floresita Sosa informed and amylase/lipase labs added for today.   Pt to eat a low fat diet and if that doesn't help he needs to go on clear liquids

## 2017-01-04 PROBLEM — K85.00 IDIOPATHIC ACUTE PANCREATITIS: Status: ACTIVE | Noted: 2017-01-01

## 2017-01-04 PROBLEM — K85.00 IDIOPATHIC ACUTE PANCREATITIS, UNSPECIFIED COMPLICATION STATUS: Status: ACTIVE | Noted: 2017-01-01

## 2017-01-04 NOTE — H&P
[facility]  History and Physical     Eleni Saldana Patient Status:  Inpatient    1950 MRN [id number]   [facility] 0SW-A Attending Mara Estrella MD   Hosp Day # 0 PCP Mason Magdaleno MD     Chief Complaint: abdominal pain file prior to encounter. Current Outpatient Prescriptions on File Prior to Encounter:  lenalidomide (REVLIMID) 25 MG Oral Cap Take 25 mg by mouth daily.  Authorization # [id number] obtained 12/13/16 Disp: 21 capsule Rfl: 0   PEG 3350 Oral Powd Pack Take 17 g organomegaly. Neurologic: No focal neurological deficits. CNII-XII grossly intact. Musculoskeletal: Moves all extremities. Extremities: No edema or cyanosis. Integument: No rashes or lesions. Psychiatric: Appropriate mood and affect.       Diagnostic

## 2017-01-04 NOTE — PLAN OF CARE
GASTROINTESTINAL - ADULT    • Minimal or absence of nausea and vomiting Progressing    • Maintains or returns to baseline bowel function Progressing          NPO except sips of water  Potassium replaced per protocol  C/o recent constipation  C/o abd pain

## 2017-01-04 NOTE — ED NOTES
Assumed care of patient at this time, received report from JAMES Meza, patient in stable condition, patient reports pain relief, comfortable at this time. Awaiting room assignment.

## 2017-01-04 NOTE — ED INITIAL ASSESSMENT (HPI)
Patient with increase epigastric pain, elevated lipase and amylase.   Sent here by Dr. Lisa Harper for evaluation

## 2017-01-04 NOTE — TELEPHONE ENCOUNTER
Pt with increased epigastric pain reported yesterday. Pt advised to avoid fatty foods, try to limit diet to clears overnight. Amylase and lipase checked with labs yesterday both are quite elevated.   I called pt today with results of labs- he is feeling w

## 2017-01-04 NOTE — ED NOTES
Report given to floor RN Roslyn Clayton, patient and wife updated on bed assignment, patient awaiting transportation.

## 2017-01-04 NOTE — ED PROVIDER NOTES
Patient Seen in: [facility] Emergency Department    History   Patient presents with:  Abdomen/Flank Pain (GI/)  Chemotherapy    Stated Complaint: on chemo abd pain    HPI    77-year-old male with history of multiple myeloma currently undergoing ch mouth as needed. HYDROmorphone HCl 4 MG Oral Tab,  Take 4 mg by mouth every 4 (four) hours as needed for Pain. Ascorbic Acid (VITAMIN C) 1000 MG Oral Tab,  Take 1,000 mg by mouth daily.    Cholecalciferol (VITAMIN D) 1000 UNITS Oral Tab,  Take 1,000 U deformity noted. Full range of motion throughout.         ED Course     Labs Reviewed   COMP METABOLIC PANEL (14) - Abnormal; Notable for the following:     Calcium, Total 8.0 (*)     AST 12 (*)     Albumin 2.9 (*)     All other components within normal li is no disposition on file for this visit. Follow-up:  No follow-up provider specified.     Medications Prescribed:  Current Discharge Medication List

## 2017-01-05 NOTE — PROGRESS NOTES
Hematology/Oncology Progress Note    Patient Name: Kvng Dodge  Medical Record Number: [id number]    YOB: 1950     Reason for Consultation:  Kvng Dodge was seen today for the diagnosis of multiple myeloma and pancreatitis    Interval K  3.8  3.6  3.6   CL  104  106   CO2  24.0  23.0   BUN  17  8   CREATSERUM  0.99  0.67*   GLU  90  73   CA  8.0*  7.0*   TP  6.1  4.9*   ALB  2.9*  2.2*   ALKPHO  65  55   AST  12*  7*   ALT  23  15*   BILT  0.7  0.5       No results for input(s): PT, I

## 2017-01-05 NOTE — PROGRESS NOTES
[facility]  Progress Note     Candice Piper Patient Status:  Inpatient    1950 MRN [id number]   [facility] 0SW-A Attending Shanae Berrios MD   Hosp Day # 1 PCP Vallie Goldberg, MD     Chief Complaint: pancreatitis     S: Teetee Perdueas possibly medication induced  1. Advance to CLD  2. IVF  3. IV analgesics  4. Monitor LFT  5. Monitor WBC, temp- will consider CT if fever or worsening abd pain  2. Multiple myeloma on Chemotherapy  1. Oncology following   2. On acyclovir  3.  Hold steroid p

## 2017-01-05 NOTE — PLAN OF CARE
PT A/O, 97% ON RA, REFUSES SCDS, UP TO BATHROOM VOIDING, IVF INFUSING AT 125CC/HR, NPO, C/O OF MILD ABDOMINAL PAIN, GIVEN DILAUDID.   PAIN - ADULT    • Verbalizes/displays adequate comfort level or patient's stated pain goal Progressing

## 2017-01-05 NOTE — DIETARY NOTE
NUTRITION INITIAL ASSESSMENT    Pt is at moderate nutrition risk. Pt does not meet malnutrition criteria.     NUTRITION DIAGNOSIS/PROBLEM:    Predicted sub-optimal intake related to alteration in gastrointestinal tract function as evidenced by NPO status EVALUATE/NUTRITION GOALS:    1. PO intake to meet at least 75% patient nutrition prescription  2. At least 75% intake of oral supplements  3. No signs of skin breakdown  4. Maintain lean body mass   5.  Pt to meet nutrition prescription via most appropriate

## 2017-01-05 NOTE — CONSULTS
Hematology/Oncology Initial Consultation Note    Patient Name: Cesar Amador  Medical Record Number: [id number]    YOB: 1950   Date of Consultation: 1/4/2017   Physician requesting consultation: Dr. Darinel Garner    Reason for Consultation: pain.  He reports falling about 1 week ago on ice and hit right anterior ribs which continue to be painful when he rolls over. Denies any increased dyspnea. No LE edema. No emesis. No bowel changes. No fevers.  He has been trying to quit smoking, averag Cholecalciferol (VITAMIN D) 1000 UNITS Oral Tab Take 1,000 Units by mouth daily. Disp:  Rfl:    aspirin 81 MG Oral Tab Take 81 mg by mouth daily. Disp:  Rfl:    acyclovir 400 MG Oral Tab Take 1 tablet (400 mg total) by mouth 2 (two) times daily.  Disp: 60 oz)  12/20/16 : 71.94 kg (158 lb 9.6 oz)  12/19/16 : 73.029 kg (161 lb)    ECOG PS: 1-2    Physical Examination:  General: Patient is alert and oriented, not in acute distress  Psych: Mood and affect are appropriate  Eyes: EOMI  ENT: Oropharynx is clear  C prophylaxis  -stop aspirin 81mg daily for now, agree with LMWH proph while inpatient.      Will continue to follow      Omar Rodarte MD  Hematology/Medical [facility] [facility]

## 2017-01-06 NOTE — PROGRESS NOTES
[facility]  Progress Note     Malou Silvestre Patient Status:  Inpatient    1950 MRN [id number]   [facility] 0SW-A Attending Kiko Graham MD   Hosp Day # 2 PCP Bella Luna MD     Chief Complaint: pancreatitis    S: Becka Q12H       ASSESSMENT / PLAN:     1. Acute pancreatitis- possibly medication induced- resolved   1. Advance diet today to soft   2. Low rate IVF  3. IV analgesics  4. Monitor LFT  5. Monitor WBC, temp- will consider CT if fever or worsening abd pain  2.  Mu

## 2017-01-06 NOTE — PROGRESS NOTES
Hematology/Oncology Progress Note    Patient Name: Monique Cuba  Medical Record Number: [id number]    YOB: 1950     Reason for Consultation:  Monique Cuba was seen today for the diagnosis of multiple myeloma and pancreatitis    Interval CL  104  106   --    CO2  24.0  23.0   --    BUN  17  8   --    CREATSERUM  0.99  0.67*   --    GLU  90  73   --    CA  8.0*  7.0*   --    TP  6.1  4.9*   --    ALB  2.9*  2.2*   --    ALKPHO  65  55   --    AST  12*  7*   --    ALT  23  15*   --    BILT

## 2017-01-06 NOTE — PLAN OF CARE
GASTROINTESTINAL - ADULT    • Minimal or absence of nausea and vomiting Progressing    • Maintains or returns to baseline bowel function Progressing          PAIN - ADULT    • Verbalizes/displays adequate comfort level or patient's stated pain goal Progres

## 2017-01-06 NOTE — DISCHARGE SUMMARY
[facility] [place] HOSPITALIST  DISCHARGE SUMMARY     Lizzy Bob Patient Status:  Inpatient    1950 MRN [id number]   [facility] 0SW-A Attending Van Mccartney MD   Hosp Day # 2 PCP Mitchell Waldron MD     Date of Admission: 2017  Date of tolerated diet. He developed diarrhea only after bowel regimen given  , c.diff negative. Further MM treatment per oncology as outpatient.      Procedures during hospitalization:   • none    Incidental or significant findings and recommendations (brief d lenalidomide 25 MG Caps   Commonly known as:  REVLIMID        Take 25 mg by mouth daily.  Authorization # [id number] obtained 12/13/16    Quantity:  21 capsule   Refills:  0             Follow-up appointment:   Dona Delarosa MD  [address]

## 2017-01-07 NOTE — PLAN OF CARE
Pt received this am, reported to physician having a lot of stools but did not report to nurses . Stool sent for cdif, results negative, dr Anais Coleman notified, disposition for discharge.  Pt updated

## 2017-01-07 NOTE — PROGRESS NOTES
[facility]  Progress Note     Sharif Myers Patient Status:  Inpatient    1950 MRN [id number]   [facility] 0SW-A Attending Sudha Kingsley MD   Hosp Day # 3 PCP Devon Yeh MD     Chief Complaint: diarrhea    S: [address] in [address]     Medications:   • Potassium Chloride ER  40 mEq Oral Q4H   • acyclovir  400 mg Oral BID   • aspirin  81 mg Oral Daily   • escitalopram  10 mg Oral Daily   • Atorvastatin Calcium  10 mg Oral Nightly   • enoxaparin  40 mg Subcutaneous Daily

## 2017-01-07 NOTE — PLAN OF CARE
Pt wife notified of pending discharge, verbal and written discharge information reviewed with pt at bedside who verbalized understanding of information. Pt to be discharged by wheelchair in care of wife home.

## 2017-01-09 NOTE — TELEPHONE ENCOUNTER
Wife calling, patient D/C from hospital.  Needs phone number to call and schedule PET. Called wife and number for CS given. She also states that patient is having about 3 diarrhea stools a day. Has not tried imodium yet.   He will try that and call us ba

## 2017-01-16 PROBLEM — R29.898 RIGHT LEG WEAKNESS: Status: ACTIVE | Noted: 2017-01-01

## 2017-01-16 NOTE — PROGRESS NOTES
Hematology/Oncology Clinic Follow Up Note    Patient Name: Agnieszka Murillo Record Number: [id number]    YOB: 1950   PCP: Dr. Nathan Gonsales  Other providers: Dr. Wil Weston (BMT), Dr. Anderson Hurt (psychologist)    Renee Mixon lightheadedness. No dyspnea. He denies any fevers or chills. No neuropathy, LE edema, or chest pain.     Past Medical History:  Past Medical History   Diagnosis Date   • Basal cell carcinoma of skin of other and unspecified parts of face 4/3/2012   • Polae History:    Social History   Marital Status:   Spouse Name: N/A    Years of Education: 12  Number of Children: 2     Occupational History     Works For [employer]      Social History Main Topics   Smoking status: Current Every Day Smoker  0.50 Pac petechiae  MSK: +right rib tenderness, otherwise no bony tenderness over spine, manubrium, or shoulders      Laboratory:    Lab Results  Component Value Date   WBC 4.9 01/16/2017   WBC 5.0 01/07/2017   WBC 4.8 01/06/2017   HGB 8.9* 01/16/2017   HGB 8.7* 01 involving the right lateral seventh rib is noted. The lytic lesion has increased in size as prior examination. Measures approximately 1.5 x 0.9 cm. SUV max is 10.4, was 8.0. Uptake involving the posterior column the left acetabulum is again identified.  CASTRO Also multiple increased lesions in the left scapula and proximal humerus are noted. EKG 8/17/16: normal    TTE 8/17/16: Left ventricle: The cavity size was normal. Wall thickness was normal. Systolic function was vigorous.  The estimated ejection fractio If no issues with reactions will drop the methylprednisolone after 1-2 cycles  -will repeat PET/CT again in 2 months to assess response since biochemical tests have not proved to be a very useful marker    *recurrent pancreatitis  -likely due to dexamethas

## 2017-01-16 NOTE — PROGRESS NOTES
Prepared letter certifying pt is still unable to work. E-mailed letter to Sunnie Skiff at [employer]: [email or web address]

## 2017-01-16 NOTE — PATIENT INSTRUCTIONS
For Dr. Inna Villanueva nurse line, call [phone] with any questions or concerns Monday through Friday 8:00 to 4:30. After hours or weekends for emergent needs [phone].

## 2017-01-16 NOTE — PROGRESS NOTES
Patient here for MD f/u and treatment. Had PET scan on 1/13. Current c/o continued fatigue, denies SOB. Continued pain in right ribs. Decreased appetite, taste changes.   Took dose of Revlimid this AM.    Per billing, will be at least three days before

## 2017-01-18 NOTE — TELEPHONE ENCOUNTER
Patient is using 2 hydromorphone daily for pain which has been helpful. He is requesting a refill. Hydromorphone refilled.

## 2017-01-20 NOTE — PATIENT INSTRUCTIONS
[facility] Chemo Education Overview    Learner:  Patient and Spouse    Learner's Barriers / Limitations of Education:  None    Names of Drugs:      Chemotherapy: DARZALEX WEEKLY FOR 8 WEEKS, THEN ONCE EVERY 2 WEEKS BEGINNING WEEK 9 THRU 24;  THEN Side effects that you should report to your doctor or health care professional as soon as possible:  · allergic reactions like skin rash, itching or hives, swelling of the face, lips, or tongue  · breathing problems  · chills  · cough  · dizziness  · feeli This medicine can cause serious allergic reactions. To reduce your risk you may need to take medicine before treatment with this medicine. Take your medicine as directed. This medicine can affect the results of blood tests to match your blood type.  These · allergic reactions like skin rash, itching or hives, swelling of the face, lips, or tongue  · breathing problems  · changes in hearing  · changes in vision  · fast, irregular heartbeat  · feeling faint or lightheaded, falls  · pain, tingling, numbness in Visit your doctor for checks on your progress. This drug may make you feel generally unwell. This is not uncommon, as chemotherapy can affect healthy cells as well as cancer cells. Report any side effects.  Continue your course of treatment even though you Check with your doctor or health care professional if you get an attack of severe diarrhea, nausea and vomiting, or if you sweat a lot. The loss of too much body fluid can make it dangerous for you to take this medicine. NOTE:This sheet is a summary.  It m

## 2017-01-20 NOTE — PROGRESS NOTES
Chemotherapy Education    Patient: Hay Carlson  Date: 1/20/17  Barriers / Limitations:  None  Diagnosis: multiple myeloma  Caregivers present: spouse    Drug names:velcade, darzelex    Chemotherapy education goals:  · Learn the drug names  · Kam Moody Irritants:  Potential extravasation at site of administration:  Achieved  Signs / symptoms include redness, swelling, pain, burning, or blistering at the site of administration:  Achieved  Notify MD/RN IMMEDIATELY if you have any of the above signs / sympt

## 2017-01-20 NOTE — PROGRESS NOTES
Palliative Care Follow up Note    Patient Name: Petar Sunshine   YOB: 1950   Medical Record Number: [id number]   CSN: [id number]   Date of visit: 1/20/2017       Chief Complaint/Reason for Visit:  L shoulder and R rib pain     History of Pres history of colon cancer 5/11/2015   • Tubular adenoma of colon 5/11/2015   • Multiple myeloma ([facility] [address]      7/2015 biopsy of rib lesion   • Basal cell carcinoma of skin of other parts of face 4/3/2012     Basal Cell Carcinoma Face     • Anemia associated with •  acyclovir 400 MG Oral Tab, Take 1 tablet (400 mg total) by mouth 2 (two) times daily. , Disp: 60 tablet, Rfl: 11    Review of Systems:  General:  Fatigue. Feels well.     Respiratory:  Denies SOB, denies cough  Cardiac:  Denies chest pain, heart palpit Practitioner

## 2017-01-20 NOTE — PATIENT INSTRUCTIONS
Treatment weekly, injection velcade and infusion.   Take oral steroid at home weekly the day after infusion/injection    Dr. Anish Polo next week as scheduled

## 2017-01-23 NOTE — TELEPHONE ENCOUNTER
Date of Treatment: 1/20/17                                Type of Chemo: Darzalex    Comments: Spoke with patient and wife. Overall pt feeling good, some fatigue, decreased appetite. C/o HA and diarrhea day after chemo.  Took Tylenol for HA and this resolve

## 2017-01-25 NOTE — PROGRESS NOTES
Patient is s/p PAC insertion; a/o x 4; hemodynamically stable/neurologically intact; denies pain; iv site removed intact; site c/d/i with no bleeding/hematoma noted; verbalized understanding of dc instructions/follow up; discharged in stable condition.

## 2017-01-25 NOTE — H&P
[facility] Patient Status:  Outpatient in a Bed    1950 MRN [id number]   Location [address] [facility] Attending Shadi Henley MD   Hosp Day # 0 PCP MD Collin Bowles the last 72 hours. Recent Labs   Lab  01/25/17   0745   INR  1.0     No results for input(s): GLU, BUN, CREATSERUM, GFRAA, GFRNAA, CA, NA, K, CL, CO2 in the last 72 hours.     Assessment/Plan:   Impression: 69year-old male with multiple myeloma    I have

## 2017-01-25 NOTE — PROCEDURES
[facility]  Procedure Note    Jolly Ramachandran Patient Status:  Outpatient in a Bed    1950 MRN [id number]   [facility]   Hosp Day # 0 PCP Heidi Lim MD     Procedure:  Po

## 2017-01-26 NOTE — PROGRESS NOTES
Patient here for MD f/cassy and treatment. PAC placed 1/25. C/O continued fatigue. Denies SOB. Decreased appetite. Denies constipation or diarrhea.

## 2017-01-26 NOTE — PATIENT INSTRUCTIONS
For Dr. Nguyen Morning nurse line, call [phone] with any questions or concerns Monday through Friday 8:00 to 4:30. After hours or weekends for emergent needs [phone].

## 2017-01-26 NOTE — PROGRESS NOTES
Education Record    Learner:  Patient    Disease / Diagnosis:Multiple myeloma    Barriers / Limitations:  None   Comments:    Method:  Discussion   Comments:    General Topics:  Diet, Infection, Medication, Pain, Precautions, Procedure, Side effects and sy

## 2017-01-26 NOTE — PROGRESS NOTES
Palliative Care Follow up Note    Patient Name: Jina Carreno   YOB: 1950   Medical Record Number: [id number]   [facility]: [id number]   Date of visit: 1/26/2017       Chief Complaint/Reason for Visit:  Symptom management follow up     History of P • GERD (gastroesophageal reflux disease)    • Impaired fasting glucose 5/20/2014   • Family history of colon cancer 5/11/2015   • Tubular adenoma of colon 5/11/2015   • Multiple myeloma ([facility] [address]      7/2015 biopsy of rib lesion   • Basal cell carcinoma of s Rfl:   •  Ascorbic Acid (VITAMIN C) 1000 MG Oral Tab, Take 1,000 mg by mouth daily. , Disp: , Rfl:   •  Cholecalciferol (VITAMIN D) 1000 UNITS Oral Tab, Take 1,000 Units by mouth daily. , Disp: , Rfl:   •  aspirin 81 MG Oral Tab, Take 81 mg by mouth daily. ,

## 2017-01-26 NOTE — PROGRESS NOTES
Hematology/Oncology Clinic Follow Up Note    Patient Name: Agnieszka Murillo Record Number: [id number]    YOB: 1950   PCP: Dr. Nathan Gonsales  Other providers: BONNIE Mendoza (palliative care), Dr. Anderson Hurt (psychologist) edema.     Past Medical History:  Past Medical History   Diagnosis Date   • Basal cell carcinoma of skin of other and unspecified parts of face 4/3/2012   • Hyperlipidemia    • Allergic rhinitis    • History of acute pancreatitis    • GERD (gastroesophageal occasion    Drug Use: No    Sexual Activity: Not on file   Not on file  Other Topics Concern   None on file     Social History Narrative       Family Medical History:  Family History   Problem Relation Age of Onset   • Cancer Father       of Colon canc CREATSERUM 0.80 01/26/2017   CREATSERUM 0.85 01/20/2017   CREATSERUM 0.84 01/16/2017   GFR 91 01/26/2017   GFRNAA 95 05/20/2014   GFRAA 110 05/20/2014   CA 9.1 01/26/2017   ALKPHO 79 01/26/2017   AST 17 01/26/2017   ALT 21 01/26/2017   BILT 0.4 01/26/201 increased since prior examination. This has an SUV max of 7.5, was 2.7. Uptake in the right anterior sixth rib has significantly increased since prior examination with SUV max of 6.9. Uptake in the left anterolateral sixth rib is also similarly increased. canal is normal the liver millimeters. There is mild facet joint degenerative change. There is mild to moderate bilateral neuroforaminal narrowing. L5-S1:  Mild to moderate broad-based degenerative disc bulge.  There is mild facet joint degenerative change clinical and biochemical relapse only 8 months later. KRD 8/22/16-1/2017, however with initial mild clinical response, but has since developed PD based on worsening bone disease and PET/CT as well as ongoing fatigue and anemia.   His myeloma oligosecretory been trying to stop smoking    *insomnia, fatigue  -continue ambien 10mg at night  -encouraged to exercise regularly during the day     *depression  -improved with lexapro  -following with palliative care Rodri NICOLE and a psychologist.   -advised pt to

## 2017-02-02 NOTE — PATIENT INSTRUCTIONS
Education Record    Learner:  Patient    Disease / Diagnosis:MULTIPLE MYELOMA    Barriers / Limitations:  None   Comments:    Method:  Brief focused and Reinforcement   Comments:    General Topics:  Medication, Side effects and symptom management and Plan

## 2017-02-02 NOTE — PROGRESS NOTES
CC:Patient presents with: Follow - Up: Patient with Multiple Myeloma here for APN visit and due for C1/D15 Velcade and Daratumumab. Also due for Zometa today. Energy level is low, appetite is low. Denies any nausea/vomiting.  Having regular BM's. started h distress. HEENT: EOMs intact. PERRL. Oropharynx is clear. Neck: No JVD. No palpable lymphadenopathy. Neck is supple. Chest: Clear to auscultation. Heart: Regular rate and rhythm. Abdomen: Soft, non tender with good bowel sounds.   Extremities: Pedal BONNIE Ayala  2/2/2017

## 2017-02-09 NOTE — PROGRESS NOTES
Palliative Care Follow up Note    Patient Name: Meghann Hassan   YOB: 1950   Medical Record Number: [id number]   CSN: [id number]   Date of visit: 2/9/2017       Chief Complaint/Reason for Visit:  Increased fatigue     History of Present Illne GERD (gastroesophageal reflux disease)    • Impaired fasting glucose 5/20/2014   • Family history of colon cancer 5/11/2015   • Tubular adenoma of colon 5/11/2015   • Multiple myeloma ([facility] [address]      7/2015 biopsy of rib lesion   • Basal cell carcinoma of skin mouth daily. , Disp: , Rfl:   •  aspirin 81 MG Oral Tab, Take 81 mg by mouth daily. , Disp: , Rfl:   •  acyclovir 400 MG Oral Tab, Take 1 tablet (400 mg total) by mouth 2 (two) times daily. , Disp: 60 tablet, Rfl: 11    Review of Systems:  General:  Fatigue.

## 2017-02-09 NOTE — PROGRESS NOTES
Patient here for MD f/u and treatment. C/O increased fatigue, denies SOB. Decreased appetite. Taking in fluids OK.

## 2017-02-09 NOTE — PROGRESS NOTES
Hematology/Oncology Clinic Follow Up Note    Patient Name: Agnieszka Murillo Record Number: [id number]    YOB: 1950   PCP: Dr. Taniya Perkins  Other providers: BONNIE George (palliative care), Dr. Vandana Otero (psychologist) diarrhea for 1 day after first dose of chemo, he hasn't had this recur. No infusion reactions. He denies any fevers or chills. No neuropathy, LE edema.     Past Medical History:  Past Medical History   Diagnosis Date   • Basal cell carcinoma of skin of o total) by mouth 2 (two) times daily.  Disp: 60 tablet Rfl: 11     Allergies:   No Known Allergies    Psychosocial History:    Social History   Marital Status:   Spouse Name: N/A    Years of Education: 12  Number of Children: 2     Occupational Histor tenderness over spine, manubrium, or shoulders      Laboratory:    Lab Results  Component Value Date   WBC 5.0 02/09/2017   WBC 6.7 02/02/2017   WBC 6.9 01/26/2017   HGB 9.4* 02/09/2017   HGB 10.5* 02/02/2017   HGB 9.6* 01/26/2017   HCT 29.7* 02/09/2017 lesion has increased in size as prior examination. Measures approximately 1.5 x 0.9 cm. SUV max is 10.4, was 8.0. Uptake involving the posterior column the left acetabulum is again identified. SUV max is 7.1, was 4.3.  Lytic lesion in this region has incre proximal humerus are noted. MRI L spine 1/16/17: FINDINGS:     LUMBAR DISC LEVELS  L1-L2:  No significant disc/facet abnormality, spinal stenosis, or foraminal narrowing.   L2-L3:  No significant disc/facet abnormality, spinal stenosis, or foraminal narr near the superior endplate of L5 could be due to compression fracture. 3. Small indeterminate lesions within T11, left sacrum, and right ilium that could represent multiple myeloma given the patient's history.   4. Mild to moderate degenerative changes of needed    *Bone  -multiple active bone lesions on relapse  -continue zometa IV [id number]  -continue norco and/or dilaudid prn (though pt has not been keen on using meds for this)    *Renal  -no issues  -follow BUN/cre    *Heme  -anemia due to myeloma  -edgardo

## 2017-02-09 NOTE — PROGRESS NOTES
Education Record    Learner:  Patient and Spouse    Disease / Diagnosis:    Barriers / Limitations:  None   Comments:    Method:  Brief focused and Discussion   Comments:    General Topics:  Medication, Side effects and symptom management and Plan of care

## 2017-02-09 NOTE — PATIENT INSTRUCTIONS
For Dr. Robert Montalvo nurse line, call [phone] with any questions or concerns Monday through Friday 8:00 to 4:30. After hours or weekends for emergent needs [phone].

## 2017-02-16 NOTE — PROGRESS NOTES
Education Record    Learner:  Patient    Disease / Diagnosis:Multiple myeloma  Barriers / Limitations:  None   Comments:    Method:  Reinforcement   Comments:    General Topics:  Medication, Precautions, Procedure, Side effects and symptom management and P

## 2017-02-16 NOTE — PROGRESS NOTES
Palliative Care Follow up Note    Patient Name: Mandy Noel   YOB: 1950   Medical Record Number: [id number]   CSN: [id number]   Date of visit: 2/16/2017       Chief Complaint/Reason for Visit:  Request to complete advanced directives   Spouse Name: N/A    Years of Education: 12  Number of Children: 2     Occupational History     Works For [employer]      Social History Main Topics   Smoking status: Current Every Day Smoker  0.50 Packs/Day  For 40.00 Years     Types: Cigarettes Sleeping well      Palliative Performance Scale: 80%     Physical Examination:  General: Patient is alert and oriented, not in acute distress. Psych:  Smiling, conversant. Good eye contact.   Mood/Affect appropriate      Advanced Directives Discussed and

## 2017-02-23 NOTE — PROGRESS NOTES
Hematology/Oncology Clinic Follow Up Note    Patient Name: Agnieszka Murillo Record Number: [id number]    YOB: 1950   PCP: Dr. Alessandra Cobian  Other providers: BONNIE Costello (palliative care), Dr. Taty Tinoe (psychologist) reactions. He denies any fevers or chills. No neuropathy, LE edema.     Past Medical History:  Past Medical History   Diagnosis Date   • Basal cell carcinoma of skin of other and unspecified parts of face 4/3/2012   • Hyperlipidemia    • Allergic rhinitis Known Allergies    Psychosocial History:    Social History   Marital Status:   Spouse Name: N/A    Years of Education: 12  Number of Children: 2     Occupational History     Works For [employer]      Social History Main Topics   Smoking status: Cu Date   WBC 6.3 02/23/2017   WBC 6.0 02/16/2017   WBC 5.0 02/09/2017   HGB 11.0* 02/23/2017   HGB 10.3* 02/16/2017   HGB 9.4* 02/09/2017   HCT 33.4* 02/23/2017   MCV 89.8 02/23/2017   MCH 29.6 02/23/2017   MCHC 32.9 02/23/2017   RDW 15.4 02/23/2017   PLT 20 is 10.4, was 8.0. Uptake involving the posterior column the left acetabulum is again identified. SUV max is 7.1, was 4.3. Lytic lesion in this region has increased in size with increased thinning of the posterior cortex.  Uptake in the right lesser trochan significant disc/facet abnormality, spinal stenosis, or foraminal narrowing. L2-L3:  No significant disc/facet abnormality, spinal stenosis, or foraminal narrowing. L3-L4:  Mild broad-based disc bulge without significant central or foraminal stenosis.  Julisa Burkett lesions within T11, left sacrum, and right ilium that could represent multiple myeloma given the patient's history. 4. Mild to moderate degenerative changes of the lumbar spine from L3-4 to L5-S1.       Impression & Plan:     *Multiple myeloma  -poor risk dilaudid prn (though pt has not been keen on using meds for this)    *Renal  -no issues  -follow BUN/cre    *Heme  -anemia due to myeloma  -follow CBCs    *ID  -continue prophylactic acyclovir 400mg BID while on proteosome inhibitor    *VTE prophylaxis  -c

## 2017-02-23 NOTE — PROGRESS NOTES
Patient here for MD f/u and treatment. C/O continued fatigue, denies SOB. Diarrhea after treatment, controlled with imodium. States ribs hurt when first lie down, resolves. Decreased appetite, trying to eat best he can.

## 2017-03-02 NOTE — PROGRESS NOTES
Palliative Care Follow up Note    Patient Name: Petar Sunshine   YOB: 1950   Medical Record Number: [id number]   CSN: [id number]   Date of visit: 3/2/2017       Chief Complaint/Reason for Visit:  Follow up     History of Present Illness:   6 unspecified parts of face 4/3/2012   • Hyperlipidemia    • Allergic rhinitis    • History of acute pancreatitis    • GERD (gastroesophageal reflux disease)    • Impaired fasting glucose 5/20/2014   • Family history of colon cancer 5/11/2015   • Tubular emilia Take 1,000 mg by mouth daily. , Disp: , Rfl:   •  Cholecalciferol (VITAMIN D) 1000 UNITS Oral Tab, Take 1,000 Units by mouth daily. , Disp: , Rfl:   •  aspirin 81 MG Oral Tab, Take 81 mg by mouth daily. , Disp: , Rfl:   •  acyclovir 400 MG Oral Tab, Take 1 ta Palliative Nurse Practitioner

## 2017-03-09 NOTE — PROGRESS NOTES
Hematology/Oncology Clinic Follow Up Note    Patient Name: Agnieszka Murillo Record Number: [id number]    YOB: 1950   PCP: Dr. Sawyer Abel  Other providers: BONNIE Mcdaniel (palliative care), Dr. Stephani Wing (psychologist) initially starts walking, but then stabilizes, reports this is unchanged over the last few months since getting the MRI to evaluate this.      Past Medical History:  Past Medical History   Diagnosis Date   • Basal cell carcinoma of skin of other and unspeci Spouse Name: N/A    Years of Education: 12  Number of Children: 2     Occupational History     Works For [employer]      Social History Main Topics   Smoking status: Current Every Day Smoker  0.50 Packs/Day  For 40.00 Years     Types: Cigarettes    Smoke HGB 11.8* 03/02/2017   HGB 11.0* 02/23/2017   HCT 35.0* 03/09/2017   MCV 89.7 03/09/2017   MCH 28.5 03/09/2017   MCHC 31.7 03/09/2017   RDW 14.8 03/09/2017   .0 03/09/2017   .0 03/02/2017   .0 02/23/2017       Lab Results  Component 2/9/2017 274.12 (H)   1/7/2017 42.96 (H)   12/19/2016 97.55 (H)   11/21/2016 57.55 (H)   10/17/2016 11.67 (H)   9/26/2016 8/8/2016 57.05 (H)   4/25/2016 1.63   11/10/2015 1.90 (H)   10/20/2015 1.91 (H)   9/28/2015 4.91 (H)   8/5/2015 65.19 (H)     Renetta emphysematous changes in lung apices identified.  No evidence of a dominant nodule, mass or consolidation.    Abdomen:  No lesions in liver, spleen, pancreas adrenal glands.  No lesions in the kidneys renal collecting systems are not dilated.  Infiltration very useful in him.      *recurrent pancreatitis  -likely due to dexamethasone, as no other culprit medication or cause is more likely  -will attempt to omit dexamethasone from future treatments and minimize steroid dosing, using only as premedications to a

## 2017-03-09 NOTE — PROGRESS NOTES
Patient here for MD f/u and treatment. States feeling OK, slight fatigue and occasional SOB with exertion. Slight decreased appetite but trying to eat. Denies constipation/diarrhea at this time.

## 2017-03-09 NOTE — PATIENT INSTRUCTIONS
For Dr. Chelsey Rodrigues nurse line, call [phone] with any questions or concerns Monday through Friday 8:00 to 4:30. After hours or weekends for emergent needs [phone].

## 2017-03-11 PROBLEM — S22.41XA CLOSED FRACTURE OF MULTIPLE RIBS OF RIGHT SIDE: Status: ACTIVE | Noted: 2017-01-01

## 2017-03-11 PROBLEM — S22.41XA CLOSED FRACTURE OF MULTIPLE RIBS OF RIGHT SIDE, INITIAL ENCOUNTER: Status: ACTIVE | Noted: 2017-01-01

## 2017-03-11 PROBLEM — R79.89 AZOTEMIA: Status: ACTIVE | Noted: 2017-01-01

## 2017-03-11 PROBLEM — D64.9 ANEMIA: Status: ACTIVE | Noted: 2017-01-01

## 2017-03-11 NOTE — ED PROVIDER NOTES
Patient Seen in: [facility] Emergency Department    History   Patient presents with:  Trauma (cardiovascular, musculoskeletal)    Stated Complaint: RIGHT SIDE RIB PAIN    HPI    52-year-old male complaining of right rib pain.   This patient has a his needed for Pain. methylPREDNISolone 16 MG Oral Tab,  Take 1 tablet (16 mg total) by mouth daily. Take only for 1 day, the day following each dose of daratumumab   PEG 3350 Oral Powd Pack,  Take 17 g by mouth daily.    escitalopram (LEXAPRO) 10 MG Oral Tab JVD.  Lungs are clear to auscultation  Cardiovascular exam shows regular rate and rhythm without murmurs abdomen is soft and nontender the right lateral mid to lower ribs has a moderate severe tenderness there is no palpable deformity.   Extremities normal Noted POA    Anemia D64.9 3/11/2017 Yes    Azotemia R79.89 3/11/2017 Yes    Closed fracture of multiple ribs of right side S22.41XA 3/11/2017 Unknown

## 2017-03-11 NOTE — ED NOTES
I evaluated the patient during my initial assessment Patient is in pain 10/10 He is unable to move due to the spasm on his right side. He is guarded.  Plan Dilaudid and Admit

## 2017-03-11 NOTE — H&P
[facility] HOSPITALIST  History and Physical     [place] Patient Status:  Observation    1950 MRN [id number]   [facility] 4NW-A Attending Henna Montes, [address] Day # 0 PCP Geovani Khan MD     Chief Complaint: Bruce Brittle He has a 20 pack-year smoking history. He has never used smokeless tobacco. He reports that he drinks alcohol. He reports that he does not use illicit drugs.   Family History:   Family History   Problem Relation Age of Onset   • Cancer Father       of C S1, S2. Regular rate and rhythm. No murmurs, rubs or gallops. Equal pulses. Chest and Back: Significant right-sided tenderness near the sixth and seventh rib  Abdomen: Soft, nontender, nondistended. Positive bowel sounds.  No rebound, guarding or organom

## 2017-03-11 NOTE — PLAN OF CARE
NURSING ADMISSION NOTE      Patient admitted via Cart  Oriented to room. Safety precautions initiated. Bed in low position. Call light in reach. Pt admitted from ER accompanied with spouse at 1000 hours.  Pt is

## 2017-03-11 NOTE — ED NOTES
Pt port a cath access, aaron needle used, 1 attempt good, secured with dressing, pt labs collected and sent, pt given meds for pain, pt made ready for xray.

## 2017-03-12 PROBLEM — M89.8X9 BONE PAIN: Status: ACTIVE | Noted: 2017-01-01

## 2017-03-12 NOTE — CM/SW NOTE
Lian Lagos, 03/12/2017, 10:29 AM  CRISTINA explained and letter given to patient. Signed copy, placed in his chart. D'c today.

## 2017-03-12 NOTE — PLAN OF CARE
NURSING DISCHARGE NOTE    Discharged Home via [place]. Accompanied by Support staff  Belongings Taken by patient/family.

## 2017-03-12 NOTE — CONSULTS
[facility] Report of Consultation      Patient Name: Marisela Ortega   YOB: 1950  Medical Record Number: [id number]  Consulting Physician: Erin Crain. Cathy Owusu M.D.    Referring Physician: Reji House MD    [facility] Alcohol Use: Yes                Comment: on occasion          Current Medications   [DISCONTINUED] HYDROmorphone HCl (DILAUDID) tab 2-4 mg 2-4 mg Oral Q2H PRN   HYDROmorphone HCl (DILAUDID) tab 2 mg 2 mg Oral Q2H PRN   Or      HYDROmorphone HCl (DILAUDID) Chloride ER (K-DUR M20) CR tab 40 mEq 40 mEq Oral Q4H       Allergies   Mr. Ronquillo has No Known Allergies. Review of Systems   Constitutional No fevers, chills, night sweats, excessive fatigue or weight loss. Allergic/Immunologic No reactions.   Eyes and oriented x 3; motor and sensory grossly intact. Psychiatric Mood and affect appropriate; coherent speech; verbalizes understanding of our discussions today.     Laboratory     Recent Results (from the past 24 hour(s))  -POTASSIUM   Collection Time: 03/

## 2017-03-12 NOTE — DISCHARGE SUMMARY
[facility] HOSPITALIST  DISCHARGE SUMMARY     Lizzy Bob Patient Status:  Observation    1950 MRN [id number]   [facility] 4NW-A Attending Randall Miles MD   Hosp Day # 1 PCP Mitchell Waldron MD     Date of Admission: 3/11/2017  D total) by mouth every 8 (eight) hours as needed for Pain. Quantity:  15 tablet   Refills:  0       lidocaine 5 % Ptch   Last time this was given:  1 patch on 3/11/2017 12:35 PM   Commonly known as:  LIDODERM        Place 1 patch onto the skin daily. tablet   Refills:  3            Where to Get Your Medications      Please  your prescriptions at the location directed by your doctor or nurse     Bring a paper prescription for each of these medications    - HYDROmorphone HCl 4 MG Tabs  - ibuprofen

## 2017-03-12 NOTE — PLAN OF CARE
Pt is alert and oriented x 4. Vitals stable. C/o slight pain in the Rt rib cage especially on movement, IV Toradol given. Pt pain is lot better. Pt wanted to go home. Discharger order written by Sami Alexander. Discharge instructions given to pt.  Will continue t

## 2017-03-14 NOTE — TELEPHONE ENCOUNTER
dr Lyric Ying wants armando to order simvastatin for him, johnnie on  [address] [phone], quoc has enough pills to get him thru thursday

## 2017-03-16 NOTE — PROGRESS NOTES
Education Record    Learner:  Patient    Disease / Diagnosis: MM    Barriers / Limitations:  None   Comments:    Method:  Discussion   Comments:    General Topics:  Medication, Side effects and symptom management and Plan of care reviewed   Comments:     Nayla Wylie

## 2017-03-22 NOTE — PROGRESS NOTES
Hematology/Oncology Clinic Follow Up Note    Patient Name: Agnieszka Murillo Record Number: [id number]    YOB: 1950   PCP: Dr. Radha Hoffman  Other providers: BONNIE Oneal (palliative care), Dr. Elba Bell (psychologist) Diagnosis Date   • Basal cell carcinoma of skin of other and unspecified parts of face 4/3/2012   • Hyperlipidemia    • Allergic rhinitis    • History of acute pancreatitis    • GERD (gastroesophageal reflux disease)    • Impaired fasting glucose 5/20/20 (two) times daily.  Disp: 60 tablet Rfl: 11     Allergies:   No Known Allergies    Psychosocial History:    Social History   Marital Status:   Spouse Name: N/A    Years of Education: 12  Number of Children: 2     Occupational History     [facility]:    Lab Results  Component Value Date   WBC 9.5 03/22/2017   WBC 7.3 03/16/2017   WBC 11.8 03/11/2017   HGB 12.0* 03/22/2017   HGB 11.3* 03/16/2017   HGB 10.9* 03/11/2017   HCT 37.1 03/22/2017   MCV 87.1 03/22/2017   MCH 28.2 03/22/2017   MC 9/28/2015 6.344 (H) 1.292   8/5/2015 61.672 (H) 0.946     Component KAPPA/LAMBDA FLC RATIO   Latest Ref Rng 0.26-1.65   3/2/2017 487.43 (H)   2/9/2017 274.12 (H)   1/7/2017 42.96 (H)   12/19/2016 97.55 (H)   11/21/2016 57.55 (H)   10/17/2016 11.67 (H) body    with a maximum SUV of 7.2 versus 7.5. Increasing uptake involves the left iliac crest with a maximum SUV of 17.8 versus 7.1. The posterior column of the left acetabulum is not well visualized with slight uptake, maximum SUV equals 2.7 versus 7.1.  Lindsay Milder methylprednisolone used for dragan pre and post medications.  -Will continue benadryl, pepcid, and tylenol premeds for dragan. *recurrent pancreatitis  -concern could be due to dexamethasone, as no other culprit medication or cause found.   Pt okay with at

## 2017-03-22 NOTE — PROGRESS NOTES
Patient here for MD f/u and treatment. Broken rib, right side from sneeze. Still painful with coughing/movement. Otherwise feeling OK. No chemo today. PAC needle flushed with NS and Heparin and removed.

## 2017-03-22 NOTE — PATIENT INSTRUCTIONS
For Dr. Boothe Heart nurse line, call [phone] with any questions or concerns Monday through Friday 8:00 to 4:30. After hours or weekends for emergent needs [phone].

## 2017-03-24 NOTE — TELEPHONE ENCOUNTER
Registered patient for pomalyst    [facility]    Phoned the patient with an update.     Await prescription authorization then will reach out to the patient and arrange schedule for next follow up and treatment

## 2017-03-24 NOTE — TELEPHONE ENCOUNTER
Pt's wife calling for update on medication.   Per MD note:  will attempt to get approval to add pomalidomide 4mg daily D1-21 q28 days to current regimen.  Will also add dexamethasone 10mg IV once weekly at same time as velcade  Message sent to Rock Damon.

## 2017-03-26 NOTE — ED PROVIDER NOTES
Patient Seen in: [facility] Emergency Department    History   Patient presents with:  Dyspnea YUMIKO SOB (respiratory)    Stated Complaint: pain on right upper chest    HPI    Patient is a pleasant 66-year-old male, with history of multiple myeloma, pr methylPREDNISolone 16 MG Oral Tab,  Take 1 tablet (16 mg total) by mouth daily. Take only for 1 day, the day following each dose of daratumumab   PEG 3350 Oral Powd Pack,  Take 17 g by mouth daily.    escitalopram (LEXAPRO) 10 MG Oral Tab,  Take 1 tablet ( Eyes: Conjunctivae and EOM are normal.   Neck: Normal range of motion. Cardiovascular: Normal rate, regular rhythm and normal heart sounds. Pulmonary/Chest: Effort normal and breath sounds normal.       Abdominal: Soft.  Bowel sounds are normal. He exh EKG:The EKG revealed rate, intervals, and axis as noted on the EKG report. I have reviewed and agree with these readings. Normal sinus rhythm at 76 bpm.  No acute ST segment changes.     Ct Angiography, Chest (cpt=71275) 3/26/2017  PROCEDURE:  CT ANGIOGRAM OF THE CHEST  COMPARISON:  [facility], CT CHEST(CONTRAST ONLY) (CPT=71260), 6/14/2016, 9:05.   INDICATIONS:  pain on right upper chest  TECHNIQUE:  IV contrast-enhanced multislice CT angiography is perfo with displacement, may reflect pathologic fracture, and this fracture appears recent. Other healing right-sided rib fractures are present, likely related to lesions, or fractures do not appear acute, with presence of healing noted.  Scattered additional foc Patient and family verbalized understanding and are comfortable with the plan as recommended. Patient ambulated freely and was subsequently discharged without incident.     Disposition and Plan     Clinical Impression:  Chest wall pain  (primary encounter

## 2017-03-27 NOTE — ED NOTES
PORT A CATH ACCESSED-BY BERNIE BRAXTON-WITH POWER PORT 1 INCH JIMENEZ NEEDLE-PT HAD PORT INSERTED AT [place]-CONFIRMED BY  OR REPORT-PT TOLERATED PROCEDURE

## 2017-03-27 NOTE — TELEPHONE ENCOUNTER
Followed up with the patient regarding treatment plan    [facility]rying to get [facility] 3125. oo  Working with [employer] to find copay assistance    Dr. Donna Sandoval plans to eventually give pomalidomide with IV dragan.

## 2017-03-29 NOTE — PROGRESS NOTES
ORAL CHEMOTHERAPY EDUCATION RECORD  Learner:  Patient and Spouse  Barriers / Limitations: Other:  patient in significant amount of pain, wife overwhelmed.     Diagnosis:   Multiple myeloma  Chemo Agents / Protocol:   Oral pomalyst in addition to IV velca Dr. Awilda sue to discuss alternative regimen.

## 2017-03-29 NOTE — PROGRESS NOTES
Education Record    Learner:  Patient    Disease / Diagnosis: Multiple Myeloma    Barriers / Limitations:  None   Comments:    Method:  Brief focused and Reinforcement   Comments:    General Topics:  Medication, Pain, Side effects and symptom management an

## 2017-03-29 NOTE — PROGRESS NOTES
Patient has pain related to his multiple myeloma, significant rib pain. He has difficult with positioning in a regular bed.   Pain management continues to be addressed ongoing with palliative care APN and MD.  He would benefit from hospital bed that will a

## 2017-03-29 NOTE — PROGRESS NOTES
Palliative Care Follow up Note    Patient Name: Caroline Esparza   YOB: 1950   Medical Record Number: [id number]   CSN: [id number]   Date of visit: 3/29/2017       Chief Complaint/Reason for Visit:  Uncontrolled pain     History of Present Ill face 4/3/2012   • Hyperlipidemia    • Allergic rhinitis    • History of acute pancreatitis    • GERD (gastroesophageal reflux disease)    • Impaired fasting glucose 5/20/2014   • Family history of colon cancer 5/11/2015   • Tubular adenoma of colon 5/11/20 mouth daily. Take only for 1 day, the day following each dose of daratumumab, Disp: 12 tablet, Rfl: 0  •  PEG 3350 Oral Powd Pack, Take 17 g by mouth daily. , Disp: , Rfl:   •  escitalopram (LEXAPRO) 10 MG Oral Tab, Take 1 tablet (10 mg total) by mouth sonia pain medication to optimize pain control and activity. Will give him IV dilaudid while here to lower pain intensity. Impression/Plan:   Pain   1. Morphine ER 30mg Q 12  2. Dilaudid 4mg Q 3 prn    Constipation  1.   Senna 2 tabs BID  2.  miralax haylie

## 2017-04-05 NOTE — PATIENT INSTRUCTIONS
For Dr. Blayne Morton nurse line, call [phone] with any questions or concerns Monday through Friday 8:00 to 4:30. After hours or weekends for emergent needs [phone].

## 2017-04-05 NOTE — PROGRESS NOTES
Hematology/Oncology Clinic Follow Up Note    Patient Name: Agnieszka Murillo Record Number: [id number]    YOB: 1950   PCP: Dr. Mingo Gallegos  Other providers: BONNIE Ferrer (palliative care), Dr. Pantera Finch (psychologist) suppositories and daily docusate. He denies any fevers or recent infections. No neuropathy, LE edema.      Past Medical History:  Past Medical History   Diagnosis Date   • Basal cell carcinoma of skin of other and unspecified parts of face 4/3/2012   • Hy Oral Tab Take 1 tablet (10 mg total) by mouth nightly as needed for Sleep. Disp: 30 tablet Rfl: 3   Magnesium Hydroxide (MILK OF MAGNESIA OR) Take by mouth as needed. Disp:  Rfl:    Ascorbic Acid (VITAMIN C) 1000 MG Oral Tab Take 1,000 mg by mouth daily. appropriate  Eyes: EOMI  ENT: Oropharynx is clear  CV: Regular rate and rhythm, no murmurs, no LE edema  Respiratory: Lungs clear to auscultation bilaterally  GI/Abd: Soft, non-tender with normoactive bowel sounds, no hepatosplenomegaly  Neurological: barbara Latest Ref Rng 0.330-1.940 mg/dL 0.571-2.630 mg/dL   3/2/2017 34.120 (H) 0.070 (L)   2/9/2017 20.011 (H) 0.073 (L)   1/7/2017 19.463 (H) 0.453 (L)   12/19/2016 18.925 (H) 0.194 (L)   11/21/2016 8.862 (H) 0.154 (L)   10/17/2016 1.015 0.087 (L)   9/26/2016 uptake, maximum SUV 21.6 versus 10.4. Development of a 1.4 x 1.1 cm right paraspinal masslike soft tissue density adjacent to the T9    vertebral body with a maximum SUV of 14.7 versus 5.8. Posterior right rib uptake with a maximum SUV of 6.0.  Uptake withi weeks   -bortezomib 1.3mg/m2 SC weekly   -dexamethasone 10mg IV weekly   -pomalidomide 4mg PO d1-21 q28 days  -Will continue benadryl, pepcid, and tylenol premeds for dragan.       *recurrent pancreatitis  -concern could be due to dexamethasone, as no other c

## 2017-04-05 NOTE — PROGRESS NOTES
Education Record    Learner:  Patient and Spouse    Disease / Diagnosis:MULTIPLE MYELOMA    Barriers / Limitations:  None   Comments:    Method:  Brief focused and Reinforcement   Comments:    General Topics:  Medication, Side effects and symptom managemen

## 2017-04-06 PROBLEM — G89.3 CANCER ASSOCIATED PAIN: Status: ACTIVE | Noted: 2017-01-01

## 2017-04-07 NOTE — PROGRESS NOTES
Palliative Care Follow up Note    Patient Name: Elver Garrett   YOB: 1950   Medical Record Number: [id number]   CSN: [id number]   Date of visit: 4/7/2017       Chief Complaint/Reason for Visit:  Palliative care follow up     History of Pres Past Medical History   Diagnosis Date   • Basal cell carcinoma of skin of other and unspecified parts of face 4/3/2012   • Hyperlipidemia    • Allergic rhinitis    • History of acute pancreatitis    • GERD (gastroesophageal reflux disease)    • Impaired 10 patch, Rfl: 0  •  methylPREDNISolone 16 MG Oral Tab, Take 1 tablet (16 mg total) by mouth daily. Take only for 1 day, the day following each dose of daratumumab, Disp: 12 tablet, Rfl: 0  •  PEG 3350 Oral Powd Pack, Take 17 g by mouth daily. , Disp: , Rfl increase activity and limit nap duration which has helped to improve sleep. Improved pain has helped sleep and activity. Discussed strategies to improve fatigue including increasing activity, rest periods.   Support provided, encouraged ongoing counseling

## 2017-04-07 NOTE — TELEPHONE ENCOUNTER
Patient will be getting his pomalyst over the weekend, instructed not to start. Will come Monday for education and further instruction on initiating the medication.

## 2017-04-10 NOTE — PROGRESS NOTES
ORAL CHEMOTHERAPY EDUCATION RECORD  Learner:  Patient and Spouse  Barriers / Limitations:  None    Diagnosis:   Multiple myeloma  Chemo Agents / Protocol:   pomalyst  Medication Name:   pomalyst 4mg  Dose:  4mg      Frequency:  Daily for 21 days with 7 d

## 2017-04-10 NOTE — PROGRESS NOTES
On 3/29  faxed order and chart notes to Shahrzad Hammer for hospital bed for patient to: [phone]. -

## 2017-04-12 NOTE — PATIENT INSTRUCTIONS
For Dr. Deep Martinez nurse line, call [phone] with any questions or concerns Monday through Friday 8:00 to 4:30. After hours or weekends for emergent needs [phone].

## 2017-04-12 NOTE — PROGRESS NOTES
Patient here for MD f/u. C/O some fatigue, denies SOB. Decreased appetite. Taking MS for shoulder pain, states helping. Taking MS and Ambien at noc to help sleep.   Started taking pomalidomide today, D1.

## 2017-04-12 NOTE — PROGRESS NOTES
Palliative Care Follow up Note    Patient Name: Fabiola Yanes   YOB: 1950   Medical Record Number: [id number]   CSN: [id number]   Date of visit: 4/12/2017       Chief Complaint/Reason for Visit:  Palliative care follow up     History of Pre Diagnosis Date   • Basal cell carcinoma of skin of other and unspecified parts of face 4/3/2012   • Hyperlipidemia    • Allergic rhinitis    • History of acute pancreatitis    • GERD (gastroesophageal reflux disease)    • Impaired fasting glucose 5/20/20 nightly, Disp: 90 tablet, Rfl: 0  •  lidocaine 5 % External Patch, Place 1 patch onto the skin daily. , Disp: 10 patch, Rfl: 0  •  PEG 3350 Oral Powd Pack, Take 17 g by mouth daily. , Disp: , Rfl:   •  escitalopram (LEXAPRO) 10 MG Oral Tab, Take 1 tablet (10 colace and increasing senna daily. Impression/Plan:   Pain - improving  1.  MS Contin 30mg Q 12    Constipation  1.  miralax daily  2. Colace daily  3.   Senna 2 tabs BID    Sleep - improved  1.  ambien 10mg Q HS      Planned Follow up:   2 weeks

## 2017-04-12 NOTE — PROGRESS NOTES
Hematology/Oncology Clinic Follow Up Note    Patient Name: Agnieszka Murillo Record Number: [id number]    YOB: 1950   PCP: Dr. Carlotta Leonardo  Other providers: BONNIE Beavers (palliative care), Dr. Carter Bonds (psychologist) Able to sleep well at night with use of MS contin + ambien. Denies any dyspnea. No recurrent pancreatitis since starting low dose dexamethasone back up. He reports having increased bilateral eye tearing and redness.   Has been using murine eye drops with methylPREDNISolone 16 MG Oral Tab Take 1 tablet (16 mg total) by mouth daily. Take only for 1 day, the day following each dose of daratumumab Disp: 12 tablet Rfl: 0   PEG 3350 Oral Powd Pack Take 17 g by mouth daily.  Disp:  Rfl:    escitalopram (Idella Sergeant) 6 Encounters:  04/12/17 : 69.31 kg (152 lb 12.8 oz)  04/05/17 : 69.4 kg (153 lb)  03/29/17 : 70.58 kg (155 lb 9.6 oz)  03/26/17 : 68.947 kg (152 lb)  03/22/17 : 69.037 kg (152 lb 3.2 oz)  03/16/17 : 69.037 kg (152 lb 3.2 oz)    ECOG PS: 2    Physical Exami monoclonal protein on SPEP or DIOR   12/19/2016 No monoclonal protein on SPEP or DIOR   11/21/2016 No monoclonal protein on SPEP or DIOR   10/17/2016 No monoclonal protein on SPEP or DIOR   9/26/2016 No monoclonal protein on SPEP or DIOR   8/8/2016 Faint IgG K uptake involves the right humeral head with a maximum SUV of 5.7 versus 3.5. Proximal sternal uptake with a maximum SUV of 20.3 versus 9.3. Development of uptake involving the    right anterolateral rib with a maximum SUV of 8.4.  Development of minimally d therefore best followed with PET/CT. 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 Cele Simon, he had a clinical and biochemical relapse only 8 months later.   KRD 8/22/16-1/2017 with initial mild clinical response, but then developed Julisa NICOLE and a psychologist.   -advised pt to work to improve physical endurance so he can go back to work as this would likely improve his mood. *eye tearing, redness  -advised to see an eye doctor for exam and evaluation.      RTC in 2 weeks    Violeta

## 2017-04-13 NOTE — PROGRESS NOTES
ARTURO faxed confirmation of verbal or form for hospital bed for patient to [address] at [phone].

## 2017-04-19 NOTE — PROGRESS NOTES
Pt arrived for chemo treatment, pt states having increased pain in back and difficulty sleeping, pt states oral medications not helping, pt states constipation but oral medication giving some relief, pt alert and appears in nad, pt ambulates with steady ga

## 2017-04-20 NOTE — PROGRESS NOTES
Palliative Care Follow up Note    Patient Name: Arthur Winslow   YOB: 1950   Medical Record Number: [id number]   CSN: [id number]   Date of visit: 4/20/2017       Chief Complaint/Reason for Visit:  Palliative follow up     History of Present unspecified parts of face 4/3/2012   • Hyperlipidemia    • Allergic rhinitis    • History of acute pancreatitis    • GERD (gastroesophageal reflux disease)    • Impaired fasting glucose 5/20/2014   • Family history of colon cancer 5/11/2015   • Tubular emilia 21 capsule, Rfl: 11  •  simvastatin 20 MG Oral Tab, Take 1 tablet by mouth  nightly, Disp: 90 tablet, Rfl: 0  •  lidocaine 5 % External Patch, Place 1 patch onto the skin daily. , Disp: 10 patch, Rfl: 0  •  PEG 3350 Oral Powd Pack, Take 17 g by mouth daily. dilaudid not effective. Instructed patient to continue daily use of laxatives to minimize constipation. He is using ambien at [facility] which is helpful. Sleep may improve with better pain control. Support provided      Impression/Plan:   Pain  1.   MS Contin

## 2017-04-26 PROBLEM — M25.511 SHOULDER PAIN, BILATERAL: Status: ACTIVE | Noted: 2017-01-01

## 2017-04-26 PROBLEM — M25.512 SHOULDER PAIN, BILATERAL: Status: ACTIVE | Noted: 2017-01-01

## 2017-04-26 NOTE — PATIENT INSTRUCTIONS
For Dr. Castaneda Smoker nurse line, call [phone] with any questions or concerns Monday through Friday 8:00 to 4:30. After hours or weekends for emergent needs [phone].

## 2017-04-26 NOTE — PROGRESS NOTES
Patient here for MD f/u and treatment. C/O increased left shoulder pain despite increased MS dose last week. Also, some pain in right shoulder. Decreased appetite,  Constipation, using Miralax. Increased fatigue, denies SOB at this time.

## 2017-04-26 NOTE — PROGRESS NOTES
Hematology/Oncology Clinic Follow Up Note    Patient Name: Agnieszka Murillo Record Number: [id number]    YOB: 1950   PCP: Dr. Hortensia Bustos  Other providers: BONNIE Agustin (palliative care), Dr. Tameka Lopez (psychologist) controlled despite increased MS contin to 30mg TID. Taking dilaudid prn but reports this is not helping much so doesn't take it very often. Still struggles with constipation at times- taking docusate BID and miralax daily, but hasn't been taking senna. [id number] obtained 3/24/17 Disp: 21 capsule Rfl: 11   simvastatin 20 MG Oral Tab Take 1 tablet by mouth  nightly Disp: 90 tablet Rfl: 0   PEG 3350 Oral Powd Pack Take 17 g by mouth daily.  Disp:  Rfl:    escitalopram (LEXAPRO) 10 MG Oral Tab Take 1 tablet (10 lb)  03/29/17 : 70.58 kg (155 lb 9.6 oz)  03/26/17 : 68.947 kg (152 lb)    ECOG PS: 2    Physical Examination:  General: Patient is alert and oriented, not in acute distress  Psych: Mood and affect are appropriate  Eyes: EOMI   ENT: Oropharynx is clear  CV SPEP or DIOR   11/21/2016 No monoclonal protein on SPEP or DIOR   10/17/2016 No monoclonal protein on SPEP or DIOR   9/26/2016 No monoclonal protein on SPEP or DIOR   8/8/2016 Faint IgG K on DIOR   4/25/2016 No monoclonal protein on SPEP or DIOR   11/10/2015 0.1 the right humeral head with a maximum SUV of 5.7 versus 3.5. Proximal sternal uptake with a maximum SUV of 20.3 versus 9.3. Development of uptake involving the    right anterolateral rib with a maximum SUV of 8.4.  Development of minimally displaced fractur of the left humeral head in relation to the glenoid suggesting a left-sided joint effusion. There is an expansile destructive lesion    measuring 5 x 2.3 cm involving the left scapula.  The there are degenerative changes of the left glenohumeral joint space cause found. Pt has tolerated lower dose of dexamethasone 10mg IV weekly as above thus far. -will continue to monitor     *constipation  -continue docusate BID + regular daily of miralax titrate to effective dose  -advised to add senna BID.    -continue t

## 2017-04-27 NOTE — CONSULTS
[facility]    PATIENT'S NAME: Felicity Gresham   RADIATION ONCOLOGIST: Miguel Angel Luis. Reece Ro M.D.    PATIENT ACCOUNT #: [de-identified] JUANCHOPaneftaly Bermeo   [facility]   MEDICAL RECORD #: [id number] YOB: 1950   CONSULTATION DATE: 04/27/2017       RADIATI as immediate release morphine 15 mg every 6 to 8 hours for breakthrough pain. His pain level had been around an 8 all the time but is much lower today after the high dose of steroid. He denies other bony pains or problems.   He does have some uptake in th axillary, or inguinal lymphadenopathy. ABDOMEN:  Benign. EXTREMITIES:  The patient has good range of motion on the left shoulder at the current time.   I did not percuss to elicit pain as this would have been uncomfortable, and I did not feel that it woul would not expect this to be particularly meaningful. Other side effects would be very unlikely at these low doses.   Pulmonary damage is theoretically possible but extremely unusual.  Following this long and thorough discussion of all the risks and benefit

## 2017-04-27 NOTE — PROGRESS NOTES
Nursing Consultation Note  Patient: Patrick Grady  YOB: 1950  Age: 69year old  Radiation Oncologist: Dr. Berna Coffey  Referring Physician: Kalina Sagastume  Diagnosis:No diagnosis found.   Consult Date: 4/27/2017    History of Nova involves the right humeral head with a maximum SUV of 5.7 versus 3.5. Proximal sternal uptake with a maximum SUV of 20.3 versus 9.3. Development of uptake involving the    right anterolateral rib with a maximum SUV of 8.4.  Development of minimally displace yesterday, pain to an 8 with any movement or touching it.              Family History:  Family History   Problem Relation Age of Onset   • Cancer Father       of Colon cancer     Medical History:  Past Medical History   Diagnosis Date   • Basal cell car tablet Rfl: 0   morphINE Sulfate ER 30 MG Oral Tab CR Take 1 tablet (30 mg total) by mouth every 8 (eight) hours. Disp: 90 tablet Rfl: 0   Zolpidem Tartrate 10 MG Oral Tab Take 1 tablet (10 mg total) by mouth nightly as needed for Sleep.  Disp: 30 tablet Rf oz)  04/19/17 : 68.765 kg (151 lb 9.6 oz)  04/12/17 : 69.31 kg (152 lb 12.8 oz)  04/05/17 : 69.4 kg (153 lb)  03/29/17 : 70.58 kg (155 lb 9.6 oz)

## 2017-04-27 NOTE — PROGRESS NOTES
Palliative Care Follow up Note    Patient Name: Mandy Noel   YOB: 1950   Medical Record Number: [id number]   CSN: [id number]   Date of visit: 4/27/2017       Chief Complaint/Reason for Visit:  Palliative care follow up     History of Pre unspecified parts of face 4/3/2012   • Hyperlipidemia    • Allergic rhinitis    • History of acute pancreatitis    • GERD (gastroesophageal reflux disease)    • Impaired fasting glucose 5/20/2014   • Family history of colon cancer 5/11/2015   • Tubular emilia Tab, Take 2 tablets (17.2 mg total) by mouth 2 (two) times daily. , Disp: 120 tablet, Rfl: 0  •  Pomalidomide 4 MG Oral Cap, Take 4 mg by mouth daily. Days 1-21 every 28 days. Patient to take daily for 21 days with 7 days off.   Authorization  [id number] obtain then will trial fentanyl patch. Patient is ok with this plan. Encouraged him to use Morphine IR for BTP since dilaudid wasn't helpful. Encouraged use of BTP medication to optimize pain control.   He will begin RT next week which should be helpful for jose david

## 2017-05-03 NOTE — PROGRESS NOTES
Hematology/Oncology Clinic Follow Up Note    Patient Name: Agnieszka Murillo Record Number: [id number]    YOB: 1950   PCP: Dr. Sameer Cervantes  Other providers: BONNIE Gotti (palliative care), Dr. Jorge Robles (psychologist), overly sedating, a few days ago he decreased to MS contin 60mg BID, and is taking a single morphine IR 15mg dose in the middle of the day which he feels is working well. Started RT to left shoulder 5/1/17.   Has fatigue, but better since reduced opiate dos MG Oral Tab Take 2 tablets (17.2 mg total) by mouth 2 (two) times daily. Disp: 120 tablet Rfl: 0   simvastatin 20 MG Oral Tab Take 1 tablet by mouth  nightly Disp: 90 tablet Rfl: 0   PEG 3350 Oral Powd Pack Take 17 g by mouth daily.  Disp:  Rfl:    gabi Rate: --  SpO2: --    Wt Readings from Last 6 Encounters:  04/27/17 : 69.219 kg (152 lb 9.6 oz)  04/26/17 : 69.037 kg (152 lb 3.2 oz)  04/19/17 : 68.765 kg (151 lb 9.6 oz)  04/12/17 : 69.31 kg (152 lb 12.8 oz)  04/05/17 : 69.4 kg (153 lb)  03/29/17 : 70.58 free kappa   2/9/2017 0.03 (H)- Monoclonal IgG kappa and faint free kappa   1/7/2017 No monoclonal protein on SPEP or DIOR   12/19/2016 No monoclonal protein on SPEP or DIOR   11/21/2016 No monoclonal protein on SPEP or DIOR   10/17/2016 No monoclonal protein uptake on the right with maximum SUV of 9.3. Development of uptake    involving the left proximal humerus with maximum SUV of the 19.8. Increasing uptake involves the right humeral head with a maximum SUV of 5.7 versus 3.5.  Proximal sternal uptake with a m detailed above.      L shoulder x ray 4/26/17: FINDINGS:  There is a stable right to left acromioclavicular joint space separation. There is inferior subluxation of the left humeral head in relation to the glenoid suggesting a left-sided joint effusion.  Jere Gatica weeks of pomalidomide (due around 6/7/17)    *recurrent pancreatitis  -concern could be due to dexamethasone when given 20mg PO twice weekly, as no other cause found. Has tolerated lower dose of dexamethasone 10mg when given IV weekly as above thus far.

## 2017-05-03 NOTE — PROGRESS NOTES
Education Record    Learner:  Patient    Disease / Diagnosis:Multiple myeloma, remission status unspecified    Barriers / Limitations:  None   Comments:    Method:  Brief focused   Comments:    General Topics:  Infection, Medication, Pain, Precautions, Pro

## 2017-05-03 NOTE — PROGRESS NOTES
Patient here for MD f/u and treatment. States feeling much better since pain med increased. Controlled better. Sleeping well. Denies SOB. Continued fatigue. Occasional constipation but under control. Decreased appetite, denies nausea.

## 2017-05-03 NOTE — PATIENT INSTRUCTIONS
For Dr. Joel Casiano nurse line, call [phone] with any questions or concerns Monday through Friday 8:00 to 4:30. After hours or weekends for emergent needs [phone].

## 2017-05-03 NOTE — PROGRESS NOTES
Palliative Care Follow up Note    Patient Name: Yusra Barrientos   YOB: 1950   Medical Record Number: [id number]   [facility]: [id number]   Date of visit: 5/3/2017       Chief Complaint/Reason for Visit:  Palliative care follow up     History of Pres carcinoma of skin of other and unspecified parts of face 4/3/2012   • Hyperlipidemia    • Allergic rhinitis    • History of acute pancreatitis    • GERD (gastroesophageal reflux disease)    • Impaired fasting glucose 5/20/2014   • Family history of colon c 1 tablet (10 mg total) by mouth nightly as needed for Sleep., Disp: 30 tablet, Rfl: 3  •  Fluticasone Propionate (FLONASE) 50 MCG/ACT Nasal Suspension, 1 spray by Nasal route daily. , Disp: 1 Bottle, Rfl: prn  •  Senna 8.6 MG Oral Tab, Take 2 tablets (17.2 Patient feels mood is improved with better pain control. Encouraged ongoing activity. Will continue current plan. Impression/Plan:   Pain - improved  1. MS Contin 60mg Q 12  2.   Morphine IR 15mg Q 4 prn    Constipation - controlled  1.  miralax haylie

## 2017-05-04 NOTE — TELEPHONE ENCOUNTER
Prescription for pomalyst authorized for refill and coming from [address]  They had been approved for financial assistance last month for the first month. There is an issue with the copay this month.  verificatin of income complete

## 2017-05-08 NOTE — PROGRESS NOTES
[facility] Radiation Treatment Management Note 1-5    Patient:  Walter Liu  Age:  69year old  Visit Diagnosis:    1.  Multiple myeloma not having achieved remission [facility])      Primary Rad/Onc:  Dr. Saintclair Scholz      Site Delivered

## 2017-05-09 NOTE — TELEPHONE ENCOUNTER
----- Message from Mir Way sent at 5/9/2017  9:34 AM CDT -----  Regarding: pomalidomide 4 mg tabs  Contact: [phone]  Mr. Keerthi Auguste says there is a problem with him getting his medication he has not received it yet and needs you to call him

## 2017-05-09 NOTE — TELEPHONE ENCOUNTER
Had called specialty pharmacy last week.   Patient had been approved for patient assistance through outside vendor      copay assistance had been the hold up which is now resolved with twyla and the patient assistance

## 2017-05-10 NOTE — PROGRESS NOTES
Pt here for C6D1 chemotherapy. Pt continues to have issues with constipation despite taking Colace BID and Senna BID.  Used MOM yesterday which was helpful. He denies abdominal pain, N/V/D Notified Hannah CHAKRABORTY NP of pt's complaint- will assess pt and give furt

## 2017-05-10 NOTE — PATIENT INSTRUCTIONS
For Dr. Roshan Shore nurse line, call  [phone] with any questions or concerns Monday through Friday 8:00 to 4:30.   After hours or weekends for emergent needs [phone]

## 2017-05-10 NOTE — PROGRESS NOTES
Palliative Care Follow up Note    Patient Name: Tony Tamez   YOB: 1950   Medical Record Number: [id number]   CSN: [id number]   Date of visit: 5/10/2017       Chief Complaint/Reason for Visit:  constipation     History of Present Illness: bilateral       Medical History:   Past Medical History   Diagnosis Date   • Basal cell carcinoma of skin of other and unspecified parts of face 4/3/2012   • Hyperlipidemia    • Allergic rhinitis    • History of acute pancreatitis    • GERD (gastroesophage as needed for Pain., Disp: 90 tablet, Rfl: 0  •  Zolpidem Tartrate 10 MG Oral Tab, Take 1 tablet (10 mg total) by mouth nightly as needed for Sleep., Disp: 30 tablet, Rfl: 3  •  Fluticasone Propionate (FLONASE) 50 MCG/ACT Nasal Suspension, 1 spray by Nasal helpful. Will have patient use this every other day if no BM. He wants to maximize OTC laxatives before using prescription laxatives. Discussed constipation strategies. Impression/Plan:   Pain  1. Continue MS Contin 60mg BID  2.   Continue morphi

## 2017-05-12 NOTE — PATIENT INSTRUCTIONS
POST-RADIATION INSTRUCTIONS:   - CALL [phone] FOR A FOLLOW-UP WITH DR. Carolene Peabody in one month  - Zonia Kerns 5/17 as scheduled  - SIDE EFFECTS OF RADIATION WILL GRADUALLY SUBSIDE, IT MAY TAKE UP TO 2 WEEKS POST-RADIATION FOR YOU TO NOTI

## 2017-05-15 NOTE — PROGRESS NOTES
[facility] Radiation Treatment Management Note 6-10    Patient:  Candelario Bell  Age:  69year old  Visit Diagnosis:    1.  Multiple myeloma not having achieved remission [facility])      Primary Rad/Onc:  Dr. Awa Gupta

## 2017-05-16 NOTE — PROGRESS NOTES
[facility]    PATIENT'S NAME: Nav Pete   RADIATION ONCOLOGIST: Constance Ricks. Lucille Church M.D.    PATIENT ACCOUNT #: [de-identified] LOCATIONEzella Bamberger   [facility]   MEDICAL RECORD #: [id number] YOB: 1950   DATE: 05/15/2017       RADIATION ONCOLOGY T the range of motion had improved. He was pleased with his treatment overall and completed his treatments without breaks or interruptions in therapy.     FOLLOWUP AND PLAN:  The patient was given a followup appointment to see me again in 1 month, but was to

## 2017-05-17 NOTE — PATIENT INSTRUCTIONS
For Dr. Castaneda Smoker nurse line, call [phone] with any questions or concerns Monday through Friday 8:00 to 4:30. After hours or weekends for emergent needs [phone].

## 2017-05-17 NOTE — TELEPHONE ENCOUNTER
Patient states pain is controlled with morphine but constipation remains an issue. He is using senna and miralax. He has been using MOM every 4-5 days after no BM. He denies any pain or cramping. He states the MOM is very effective for him.   Instructed

## 2017-05-17 NOTE — PROGRESS NOTES
Hematology/Oncology Clinic Follow Up Note    Patient Name: Agnieszka Murillo Record Number: [id number]    YOB: 1950   PCP: Dr. Sheron Fischer  Other providers: BONNIE Sue (palliative care), Dr. Malgorzata Franco (psychologist), presents. He continues to take MS Contin 60mg BID for pain control. Rib pains have been less lately. No longer needing to take any breakthrough opiates. Still struggles with constipation at times, BMs about every 2-3 days.   Reports he is taking senna an Take 2 tablets (17.2 mg total) by mouth 2 (two) times daily. Disp: 120 tablet Rfl: 0   simvastatin 20 MG Oral Tab Take 1 tablet by mouth  nightly Disp: 90 tablet Rfl: 0   PEG 3350 Oral Powd Pack Take 17 g by mouth daily.  Disp:  Rfl:    escitalopram (Alcario Nim from Last 6 Encounters:  05/08/17 : 68.947 kg (152 lb)  05/03/17 : 68.856 kg (151 lb 12.8 oz)  04/27/17 : 69.219 kg (152 lb 9.6 oz)  04/26/17 : 69.037 kg (152 lb 3.2 oz)  04/19/17 : 68.765 kg (151 lb 9.6 oz)  04/12/17 : 69.31 kg (152 lb 12.8 oz)    ECOG PS 2/9/2017 0.03 (H)- Monoclonal IgG kappa and faint free kappa   1/7/2017 No monoclonal protein on SPEP or DIOR   12/19/2016 No monoclonal protein on SPEP or DIOR   11/21/2016 No monoclonal protein on SPEP or DIOR   10/17/2016 No monoclonal protein on SPEP or versus 5.1 with development of increased uptake on the right with maximum SUV of 9.3. Development of uptake    involving the left proximal humerus with maximum SUV of the 19.8.  Increasing uptake involves the right humeral head with a maximum SUV of 5.7 andres 1/13/17, there is progression of metastases as detailed above.      L shoulder x ray 4/26/17: FINDINGS:  There is a stable right to left acromioclavicular joint space separation.  There is inferior subluxation of the left humeral head in relation to the gle 10mg IV weekly  -Will continue benadryl, pepcid, and tylenol premeds for dragan.    -will plan to repeat PET/CT after 8 weeks of pomalidomide (due around 6/7/17)    *recurrent pancreatitis  -concern could be due to dexamethasone when given 20mg PO twice week

## 2017-05-17 NOTE — PROGRESS NOTES
Education Record    Learner:  Patient    Disease / Diagnosis:    Barriers / Limitations:  None   Comments:    Method:  Discussion   Comments:    General Topics:  Side effects and symptom management and Plan of care reviewed   Comments:    Outcome:  Shows u

## 2017-05-17 NOTE — PROGRESS NOTES
Patient here for MD f/u and treatment. C/O continued left arm pain. MS every 12 hours with IR for breakthrough. Constipation, taking Senna and MOM. Last BM 2 days ago, soft. Decreased appetite, states taking in fluids. Denies SOB.   Per MD, if continu

## 2017-05-24 NOTE — PROGRESS NOTES
Education Record    Learner:  Patient    Disease / Diagnosis:multiple myeloma    Barriers / Limitations:  None   Comments:    Method:  Discussion   Comments:    General Topics:  Infection, Medication, Side effects and symptom management, Plan of care revie

## 2017-05-24 NOTE — PROGRESS NOTES
Palliative Care Follow up Note    Patient Name: Carlin Cervantes   YOB: 1950   Medical Record Number: [id number]   CSN: [id number]   Date of visit: 5/24/2017       Chief Complaint/Reason for Visit:  Follow up    History of Present Illness:   6 face 4/3/2012   • Hyperlipidemia    • Allergic rhinitis    • History of acute pancreatitis    • GERD (gastroesophageal reflux disease)    • Impaired fasting glucose 5/20/2014   • Family history of colon cancer 5/11/2015   • Tubular adenoma of colon 5/11/20 Senna 8.6 MG Oral Tab, Take 2 tablets (17.2 mg total) by mouth 2 (two) times daily. , Disp: 120 tablet, Rfl: 0  •  simvastatin 20 MG Oral Tab, Take 1 tablet by mouth  nightly, Disp: 90 tablet, Rfl: 0  •  PEG 3350 Oral Powd Pack, Take 17 g by mouth daily. , D 12  2.  Morphine IR 15 prn    Constipation  1. Senna 2 tabs BID  2.  miralax BID  3. MOM every other day prn    Fatigue  1.   Increase activity    Planned Follow up:   3 weeks    15 total minutes spent with patient >90% of visit was spent in counseling an

## 2017-05-26 PROBLEM — N17.9 ACUTE KIDNEY INJURY (HCC): Status: ACTIVE | Noted: 2017-01-01

## 2017-05-26 PROBLEM — N28.9 RENAL INSUFFICIENCY: Status: ACTIVE | Noted: 2017-01-01

## 2017-05-26 PROBLEM — R73.9 HYPERGLYCEMIA: Status: ACTIVE | Noted: 2017-01-01

## 2017-05-26 PROBLEM — E86.0 DEHYDRATION: Status: ACTIVE | Noted: 2017-01-01

## 2017-05-26 NOTE — ED PROVIDER NOTES
Patient Seen in: [facility] 4nw-a    History   Patient presents with:  Nausea/Vomiting/Diarrhea (gastrointestinal)    Stated Complaint: nausea, vomiting, diarrhea    HPI    Patient is a 75-year-old male comes in emergency room for evaluation of naus Nasal Suspension,  1 spray by Nasal route daily. Senna 8.6 MG Oral Tab,  Take 2 tablets (17.2 mg total) by mouth 2 (two) times daily. simvastatin 20 MG Oral Tab,  Take 1 tablet by mouth  nightly   PEG 3350 Oral Powd Pack,  Take 17 g by mouth daily.    e conjunctiva is pink. Oropharynx is unremarkable, no exudate. NECK: Supple, trachea midline, no lymphadenopathy. LUNG: Lungs clear to auscultation bilaterally, no wheezing, no rales, no rhonchi. CARDIOVASCULAR: Regular rate and rhythm. Normal S1S2.   Kristina Tania DRAW BLUE   RAINBOW DRAW GOLD   RAINBOW DRAW LAVENDER   RAINBOW DRAW LIGHT GREEN   C. DIFFICILE(TOXIGENIC)PCR   OCCULT BLOOD, STOOL   STOOL CULTURE W/SHIGATOXIN   C. DIFFICILE(TOXIGENIC)PCR   STOOL CULTURE W/SHIGATOXIN    Narrative:      The following order

## 2017-05-26 NOTE — PLAN OF CARE
Patient received from ER via stretcher, episode of diarrhea, voided stool samples sent. Lungs clear on room air, abdomen soft, non-tender. Right port a cath accessed and IVF infusing.

## 2017-05-27 NOTE — PLAN OF CARE
Maintains or returns to baseline bowel function Progressing    Less frequent stools, negative for c-diff and occult.   Achieve highest/safest level of mobility/gait Progressing    Ambulatory independently    Patient received this am, sitting up in bed, eati

## 2017-05-27 NOTE — DISCHARGE SUMMARY
[facility] [place] HOSPITALIST  DISCHARGE SUMMARY     Patrick Grady Patient Status:  Inpatient    1950 MRN [id number]   [facility] 4NW-A Attending Salma Lamas MD   Hosp Day # 1 PCP Kalina Sagastume MD     Date of Admission: 2017  Date o is advised to hydrate well, and resume stool softener if no further diarrhea/ or BM in the next 24-48 hours.     Procedures during hospitalization:   • none    Incidental or significant findings and recommendations (brief descriptions):  • none    Lab/Test Refills:  0       vitamin C 1000 MG Tabs        Take 1,000 mg by mouth daily. Refills:  0       Vitamin D 1000 units Tabs        Take 1,000 Units by mouth daily.     Refills:  0         STOP taking these medications          PEG 3350 Pack   Commonly kn

## 2017-05-27 NOTE — PLAN OF CARE
GASTROINTESTINAL - ADULT    • Maintains or returns to baseline bowel function Not Progressing          Impaired Functional Mobility    • Achieve highest/safest level of mobility/gait Progressing          Pt with diarrhea X3 this shift.  Able to ambulate ind

## 2017-05-31 PROBLEM — R29.898 RIGHT LEG WEAKNESS: Status: RESOLVED | Noted: 2017-01-01 | Resolved: 2017-01-01

## 2017-05-31 NOTE — PROGRESS NOTES
Education Record    Learner:  Patient    Disease / Diagnosis:Multiple myeloma in relapse    Barriers / Limitations:  None   Comments:    Method:  Brief focused   Comments:    General Topics:  Infection, Medication, Pain, Precautions, Procedure, Side effect

## 2017-05-31 NOTE — PATIENT INSTRUCTIONS
For Dr. Daisha Wang nurse line, call [phone] with any questions or concerns Monday through Friday 8:00 to 4:30. After hours or weekends for emergent needs [phone].

## 2017-05-31 NOTE — PROGRESS NOTES
Patient here for MD f/u and treatment. Was inpatient Friday to Saturday for increased diarrhea. Given INF. Feeling better since home. Trying to drink enough fluids. No diarrhea or constipation. Denies pain.

## 2017-05-31 NOTE — PROGRESS NOTES
Hematology/Oncology Clinic Follow Up Note    Patient Name: Agnieszka Murillo Record Number: [id number]    YOB: 1950   PCP: Dr. Candace Myers  Other providers: BONNIE Montez (palliative care), Dr. Damaris Tsai (psychologist), discharge 4 days ago, he hasn't taken any of his pain medications and is feeling great. No pain at all. Shoulders are doing well with great mobility. His energy is better as well. No further diarrhea. Still with some mild dyspnea which is unchanged.  H by mouth as needed. Disp:  Rfl:    Ascorbic Acid (VITAMIN C) 1000 MG Oral Tab Take 1,000 mg by mouth daily. Disp:  Rfl:    Cholecalciferol (VITAMIN D) 1000 UNITS Oral Tab Take 1,000 Units by mouth daily.  Disp:  Rfl:    acyclovir 400 MG Oral Tab Take 1 ta EOMI   ENT: Oropharynx is clear  CV: Regular rate and rhythm, no murmurs, no LE edema  Respiratory: Lungs clear to auscultation bilaterally  GI/Abd: Soft, non-tender with normoactive bowel sounds, no hepatosplenomegaly  Neurological: grossly intact.       L 4/25/2016 No monoclonal protein on SPEP or DIOR   11/10/2015 0.19 (H)   10/20/2015 0.23 (H)   9/28/2015 0.42 (H)   8/5/2015 1.18 (H)     Component KAPPA FREE LIGHT CHAIN LAMBDA FREE LIGHT CHAIN   Latest Ref Rng 0.330-1.940 mg/dL 0.571-2.630 mg/dL   5/10/2 of uptake involving the    right anterolateral rib with a maximum SUV of 8.4.  Development of minimally displaced fracture involving the right sixth lateral rib with a maximum SUV of 14.1 versus 5.6 and more anteriorly healing fracture with a maximum SUV of    measuring 5 x 2.3 cm involving the left scapula. The there are degenerative changes of the left glenohumeral joint space with marginal spurs. There is also a sclerotic lesion involving the posterior left ninth rib.  There is a 1.5 cm lytic lesion involvi dose of dexamethasone 10mg when given IV weekly as above thus far. -will continue to monitor     *constipation with recent episode of diarrhea  -now of bowel regimen and doing well.   Suspect viral gastroenteritis that caused the diarrhea or over use of mi

## 2017-06-01 NOTE — PROGRESS NOTES
Palliative Care Follow up Note    Patient Name: Tony Tamez   YOB: 1950   Medical Record Number: [id number]   CSN: [id number]   Date of visit: 6/1/2017       Chief Complaint/Reason for Visit:  Follow up     History of Present Illness:   6 insufficiency       Medical History:   Past Medical History   Diagnosis Date   • Basal cell carcinoma of skin of other and unspecified parts of face 4/3/2012   • Hyperlipidemia    • Allergic rhinitis    • History of acute pancreatitis    • GERD (gastroesop for Pain., Disp: 90 tablet, Rfl: 0  •  Fluticasone Propionate (FLONASE) 50 MCG/ACT Nasal Suspension, 1 spray by Nasal route daily. , Disp: 1 Bottle, Rfl: prn  •  simvastatin 20 MG Oral Tab, Take 1 tablet by mouth  nightly, Disp: 90 tablet, Rfl: 0  •  escita improved  1.  miralax prn  2. Senna 1 tab BID prn    Sleep - improved  1. Continue ambien    Appetite  1. Small frequent meals  2. Dietician    Depression  1.   Continue lexapro 10mg daily    Planned Follow up:   2 weeks    20 total minutes spent with p

## 2017-06-01 NOTE — TELEPHONE ENCOUNTER
Call placed to patient after message left for Inga Briseno stating he had diarrhea. Spoke with Jany Galaviz who sounds weak and states he had 7-8 watery stools since last night. He received pomalidomide, daratumumab, bortezomib, dexamethasone yesterday.   He was hosp

## 2017-06-01 NOTE — PROGRESS NOTES
Pt here for APN assessment and IV Hydration. Pt states he has had ongoing diarrhea for the last 2 weeks. Today he reports 7-8 episodes since 0230; only took 1 dose of imodium at home with no relief.  Pt states he is not eating much at this time and has been

## 2017-06-01 NOTE — PROGRESS NOTES
ANP Visit Note    Patient Name: Ivania Mensah   YOB: 1950   Medical Record Number: [id number]   CSN: [id number]   Date of visit: 6/1/2017       Chief Complaint/Reason for Visit:  Multiple Myeloma, diarrhea      History of Present Illness:  P History of acute pancreatitis    • GERD (gastroesophageal reflux disease)    • Impaired fasting glucose 5/20/2014   • Family history of colon cancer 5/11/2015   • Tubular adenoma of colon 5/11/2015   • [facility]      7/2015 biopsy of rib lesio mouth every 3 (three) hours as needed for Pain., Disp: 90 tablet, Rfl: 0  •  Fluticasone Propionate (FLONASE) 50 MCG/ACT Nasal Suspension, 1 spray by Nasal route daily. , Disp: 1 Bottle, Rfl: prn  •  simvastatin 20 MG Oral Tab, Take 1 tablet by mouth  night Date: 05/31/2017   Value: 6.5* Ref Range 43.0-279.0 mg/dL Status: Final   Glucose Date: 05/31/2017   Value: 91  Ref Range 70-99 mg/dL Status: Final   BUN Date: 05/31/2017   Value: 13  Ref Range 8-20 mg/dL Status: Final   Creatinine Date: 05/31/2017   Value Date: 05/31/2017   Value: 255.0  Ref Range 150.0-450.0 10(3)uL Status: Final   MCV Date: 05/31/2017   Value: 80.1  Ref Range 80.0-99.0 fL Status: Final   MCH Date: 05/31/2017   Value: 25.1* Ref Range 27.0-33.2 pg Status: Final   MCHC Date: 05/31/2017   Sia arrange for fluids tomorrow, will call if feels he does not need this. Emotional Well Being:  I have assessed the patient's emotional well-being and any concerns about anxiety or depression.   We discussed issues of distress, coping difficulties and soci

## 2017-06-07 NOTE — PROGRESS NOTES
Patient here for C7/D8 due for Dexamethasone IV and Velcade. Comes in with complaints of productive cough (white secretions), fatigue, low appetite, right rib pains (not new, worse when he coughs), dizziness (not new per patient's wife). No fevers.  Receive

## 2017-06-07 NOTE — PROGRESS NOTES
CC:Patient presents with: Follow - Up: new cough  Patient here for C7/D8 due for Dexamethasone IV and Velcade.  Comes in with complaints of productive cough (white secretions), fatigue, low appetite, right rib pains (not new, worse when he coughs), dizzine drugs.    problem list  Myeloma    MEDICATIONS REVIEWED:    Vital signs: 132/67, 70, 20, 98 temp  Wt stable at 140 pounds    ROS Pertinent items are noted in HPI. PE  General: Patient is alert and oriented x 3, not in acute distress.   HEENT: EOMs intact Microcytosis Small (A) (none)   Tear Drop Cells Small (A) (none)   Ovalocytes Moderate (A) (none)                IMPRESSION/PLAN:    1. Multiple myeloma: proceed with treatment as scheduled today    Patient instructions  May use robitussin for cough  Faheem

## 2017-06-07 NOTE — PATIENT INSTRUCTIONS
Take Robitussin for the cough, if cough does not get better after a couple of days to call BONNIE Meyer directly at [phone].

## 2017-06-12 PROBLEM — M84.48XA PATHOLOGIC LUMBAR VERTEBRAL FRACTURE: Status: ACTIVE | Noted: 2017-01-01

## 2017-06-12 PROBLEM — M84.48XA PATHOLOGIC LUMBAR VERTEBRAL FRACTURE, INITIAL ENCOUNTER: Status: ACTIVE | Noted: 2017-01-01

## 2017-06-12 NOTE — H&P
[facility] HOSPITALIST  History and Physical     Ramu Dumont Patient Status:  Inpatient    1950 MRN [id number]   [facility] 3SW-A Attending [facility] Day # 0 PCP Bruce Grady MD     Chief Complaint: Humza Freedman reports that he has been smoking Cigarettes. He has a 20 pack-year smoking history. He has never used smokeless tobacco. He reports that he drinks alcohol. He reports that he does not use illicit drugs.     Family History:   Family History   Problem Relati °C) (Oral)  Resp 18  Ht 5' 6\" (1.676 m)  Wt 140 lb (63.504 kg)  BMI 22.61 kg/m2  SpO2 95%  General: No acute distress. Alert and oriented x 3. HEENT: Normocephalic atraumatic. Moist mucous membranes. EOM-I. PERRLA. Anicteric. Neck: No lymphadenopathy.  Merly Smart Lexapro. 6. Hyperlipidemia-we will continue on statin therapy. 7.    Hypokalemia-we will replace per protocol  8. Anemia-likely due to chemotherapy. Hemoglobin 9.5. We will continue to monitor.   Quality:  · DVT Prophylaxis: Heparin  · CODE status: F

## 2017-06-12 NOTE — CONSULTS
[id number] [facility] Neurosurgery Consult    Eleni Saldana Patient Status:  Inpatient    1950 MRN [id number]   [facility] 3SW-A Attending Mehran Mendoza MD   Hosp Day # 0 PCP Mason Magdaleno MD     REASON FOR CONSULTATION:  Low smokeless tobacco. He reports that he drinks alcohol. He reports that he does not use illicit drugs.     ALLERGIES:  No Known Allergies    MEDICATIONS:    Prescriptions prior to admission:  diphenoxylate-atropine 2.5-0.025 MG Oral Tab Take 1-2 tablets by mo Daily   atorvastatin (LIPITOR) tab 10 mg 10 mg Oral Nightly   0.9%  NaCl infusion  Intravenous Continuous   Heparin Sodium (Porcine) 5000 UNIT/ML injection 5,000 Units 5,000 Units Subcutaneous Q8H [address]   acetaminophen (TYLENOL) tab 650 mg 650 mg Oral Q6H IA 5/5 bilaterally. Finger-to-nose coordination is intact. Gait deferred.      SPINE: Point tenderness just right of midline at L4-5      DIAGNOSTIC DATA:   Lab Results  Component Value Date   WBC 5.6 06/12/2017   HGB 9.5 06/12/2017   HCT 29.8 06/12/2017   PL

## 2017-06-12 NOTE — CONSULTS
Hematology/Oncology Initial Consultation Note    Patient Name: Agnieszka Murillo Record Number: [id number]    YOB: 1950   Date of Consultation: 6/12/2017   Physician requesting consultation: Dr. Loran Goodell for [facility], [facility] back as well as in right abdomen and right flank. Also with cough with productive sputum for the last couple weeks. No fevers or chills. Was having constipation, but had a BM yesterday with some improvement in abd pain following this.   Reports current pa off.  Authorization  [id number] obtained 5/31/17 Disp: 21 capsule Rfl: 11   aspirin 81 MG Oral Tab Take 81 mg by mouth daily. Disp:  Rfl:    Fluticasone Propionate (FLONASE) 50 MCG/ACT Nasal Suspension 1 spray by Nasal route daily.  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 There is minimal age indeterminate pathologic compression deformity of superior endplates of L4 and L5. Clinical correlation recommended.    2. Possible nonspecific enteritis. Clinical correlation recommended.     CXR 6/12/17: Chronic r which is relatively stable   to slightly decreased.  Of note, the prior noted more and fairly located left humeral shaft lesion is no longer noted on the current study.    The prior noted lesion along the left scapular blade currently reveals a max SUV of 6 improvement.    =====  CONCLUSION:     1.  There are numerous regions of abnormal FDG uptake, the majority which were seen on the prior exam.  2. On the prior noted FDG avid lesions, the majority reveal interval increase in radiotracer uptake, most notably activity.    -dilaudid IV prn  -try lidocaine patch  -will discuss possible RT with rad onc  -control constipation as below    *constipation with recent episode of diarrhea  -add back senna-s BID  -milk of mag prn    *anemia due to myeloma and chemo effects

## 2017-06-12 NOTE — CONSULTS
[facility]  Interventional Neuroradiology  Consultation Note    Ramu Dumont Patient Status:  Inpatient    1950 MRN [id number]   [facility] 3SW-A Attending Talya Peres MD   Hosp Day # 0 PCP Bruce Grady MD     [facility] drugs. ALLERGIES:  No Known Allergies    MEDICATIONS:    No current outpatient prescriptions on file.     Current Facility-Administered Medications:  HYDROmorphone HCl PF (DILAUDID) 1 MG/ML injection 0.5 mg 0.5 mg Intravenous Q30 Min PRN   aspirin chewab or leg weakness  Sensory: Intact to light touch, no decreased sensation, intact to pinprick, denies radiating pain or other radiating symptoms. No tenderness to palpation on spine. No tenderness to palpation along paraspinal muscles.   He is currently [employer] this time  3.  Further recommendations per physician after MRI is complete, discussed with patient and wife      Page Lowers, 5 Greshamneal Galeana  6/12/2017, 3:12 PM  Raegan Main

## 2017-06-12 NOTE — PROGRESS NOTES
Per onco, will change pain meds around & change chemo regmn also, home when ok with other disciplines. Will continue to monitor.

## 2017-06-12 NOTE — PLAN OF CARE
NURSING ADMISSION NOTE      Patient admitted via cart from ER  Oriented to room. Safety precautions initiated. Bed in low position. Call light in reach. Wife at bedside.  Plan of care reviewed

## 2017-06-12 NOTE — ED PROVIDER NOTES
Patient Seen in: [facility] Emergency Department    History   Patient presents with:  Abdomen/Flank Pain (GI/)    Stated Complaint: abd pain x few days    HPI    History of multiple myeloma on chemotherapy coming in with right-sided abdominal pain Fluticasone Propionate (FLONASE) 50 MCG/ACT Nasal Suspension,  1 spray by Nasal route daily. escitalopram (LEXAPRO) 10 MG Oral Tab,  Take 1 tablet (10 mg total) by mouth daily. Magnesium Hydroxide (MILK OF MAGNESIA OR),  Take by mouth as needed.      A Cardiovascular: Normal rate and intact distal pulses. Pulmonary/Chest: Effort normal. No respiratory distress. Abdominal: Soft. He exhibits distension. There is no tenderness. There is no rebound and no guarding.        Musculoskeletal: Normal range of Please view results for these tests on the individual orders. CT scan demonstrates multiple mild prominent fluid-filled small bowel loops. Appendix not visualized with no evidence of appendicitis.   Severe atherosclerosis of the aorta with mural thromb

## 2017-06-13 NOTE — PLAN OF CARE
PAIN - ADULT    • Verbalizes/displays adequate comfort level or patient's stated pain goal Adequate for Discharge        SAFETY ADULT - FALL    • Free from fall injury Adequate for Discharge        D/C INSTRUCTIONS GIVEN TO PT.

## 2017-06-13 NOTE — PROGRESS NOTES
Hematology/Oncology Progress Note    Patient Name: Penny Amaro  Medical Record Number: [id number]    YOB: 1950     Reason for Consultation:  Penny Amaro was seen today for the diagnosis of multiple myeloma    Interval events:  Reports tenderness    Laboratory:  Recent Labs   Lab  06/12/17 0142 06/13/17   0547   WBC  5.6  7.2   HGB  9.5*  8.6*   HCT  29.8*  27.4*   PLT  253.0  217.0   MCV  79.5*  80.8   RDW  16.7*  16.9*   NEPRELIM  3.90   --        Recent Labs   Lab  06/12/17 0142 posterior elements currently has a max SUV of 8.24 as opposed to 3.28, suggesting interval increase in activity at this level.    Regarding the prior noted regions of abnormal uptake along the scapula bilaterally, the prior noted right scapular lesion angeline significant interval increase, currently with a max SUV of 30.85 as opposed to 17.79 on the prior study.    There is a sacral lesion that is retrospectively seen on the prior study and has significantly increased in radiotracer uptake, currently with a max based on both t(4;14) and 17p deletion. +oligosecretory, therefore best followed with PET/CT.  Has been on treatment with velcade/pomalidomide/daratumumab/dex for the last 2 months however PET/CT done last week shows a mixed response.   While his light shar

## 2017-06-13 NOTE — PROGRESS NOTES
Short EULALIO Progress Note:    Patient seen and examined with Dr. Devon Freire. His back pain has resolved today. He is able to get in and out of bed. No focal neurological findings on exam.  MRI reviewed and discussed with patient.   Clinic information given i

## 2017-06-13 NOTE — PLAN OF CARE
PAIN - ADULT    • Verbalizes/displays adequate comfort level or patient's stated pain goal Progressing        SAFETY ADULT - FALL    • Free from fall injury Progressing        PT VERY ANXIOUS,FORGETFUL.  PT STATES\" I CAN EAT NOW DR NANCY GODWIN AND I WILL

## 2017-06-13 NOTE — PLAN OF CARE
PAIN - ADULT    • Verbalizes/displays adequate comfort level or patient's stated pain goal Progressing        SAFETY ADULT - FALL    • Free from fall injury Progressing        Pt A&Ox3. On ra.  & scds in place. Pt up at eliud. Voiding in BR.  Pain controll

## 2017-06-13 NOTE — PLAN OF CARE
PAIN - ADULT    • Verbalizes/displays adequate comfort level or patient's stated pain goal Progressing        SAFETY ADULT - FALL    • Free from fall injury Progressing        Pt's pain is controlled with the PO pain meds.  Pt is up safely with standby assi

## 2017-06-13 NOTE — DISCHARGE SUMMARY
[facility] PSYCHIATRIC [place] HOSPITALIST  DISCHARGE SUMMARY     Chester Carmona Patient Status:  Inpatient    1950 MRN [id number]   [facility] 3SW-A Attending Channing Blackwell MD   Hosp Day # 1 PCP Molly Hutchinson MD     Date of Admission: 2017  Date Synopsis: He is evaluated by neurosurgery as well as oncology. His pain subsided. He was evaluated for vertebroplasty but due to having no pain today, no procedure was indicated and patient preferred to go home on oral analgesics.   The patient improved m Take 1 tablet (10 mg total) by mouth daily. Quantity:  30 tablet   Refills:  5       Fluticasone Propionate 50 MCG/ACT Susp   Last time this was given:  1 spray on 6/12/2017  9:49 AM   Commonly known as:  FLONASE        1 spray by Nasal route daily. appt      Vital signs:  Temp:  [97.4 °F (36.3 °C)-98.4 °F (36.9 °C)] 98.4 °F (36.9 °C)  Pulse:  [67-73] 73  Resp:  [16-20] 20  BP: (120-155)/(56-72) 155/72 mmHg    Physical Exam:    General: No acute distress.    Respiratory: Clear to auscultation bilateral

## 2017-06-13 NOTE — TELEPHONE ENCOUNTER
Wife LM stating she missed call from office today. APN may have called her this am.  sent to Analilia Queen to call.

## 2017-06-13 NOTE — PROGRESS NOTES
[id number] [facility] Progress Note    Meghann Hassan Patient Status:  Inpatient    1950 MRN [id number]   [facility] 3SW-A Attending Yvrose Caro MD   Hosp Day # 1 PCP Christine Abrams MD     CC:   Follow up for low ba

## 2017-06-14 PROBLEM — M54.59 INTRACTABLE LOW BACK PAIN: Status: ACTIVE | Noted: 2017-01-01

## 2017-06-14 NOTE — PLAN OF CARE
Dr. Antonio Bob paged about keeping pt NPO for possible kypho today after MRI results on last hospital admission. Dr. Antonio Bob stated to call Wanetta Dubin about surgery vs. Radiation to site.    Wanetta Dubin suggested to call IR for radiologist to review imaging since t

## 2017-06-14 NOTE — CONSULTS
[facility]    Report of Consultation    Colten Mandujano Patient Status:  Observation    1950 MRN [id number]   [facility] 3SW-A Attending Chrystal Oppenheim, MD   Hosp Day # 0 PCP Jose Raul Godfrey MD     Date of Admission:   associated with chemotherapy 11/12/2015   • Basal cell carcinoma of skin of other and unspecified parts of face 4/3/2012   • Multiple myeloma ([facility] [address]      7/2015 biopsy of rib lesion   • Basal cell carcinoma of skin of other parts of face 4/3/2012     Basal Intravenous, Q4H PRN  •  HYDROmorphone HCl PF (DILAUDID) 1 MG/ML injection 0.2 mg, 0.2 mg, Intravenous, Q2H PRN **OR** HYDROmorphone HCl PF (DILAUDID) 1 MG/ML injection 0.4 mg, 0.4 mg, Intravenous, Q2H PRN **OR** HYDROmorphone HCl PF (DILAUDID) 1 MG/ML inj muscle strain, stable. Slight anterior wedging of the L4 and L5 vertebral bodies with   sclerosis noted on recent MRI of the lumbar spine and shown to be chronic. Previously described metastatic deposits not as well appreciated radiographically.  Disc space encounter     Constipation     Intractable low back pain     Compression fracture of lumbar spine, non-traumatic (HCC)     Pathologic fracture of lumbar vertebra      1.  Multiple myeloma, refractory disease, patient has options for treatment and can live w dragan/velcade/pom/dex, will stop treatment.   Plan to start ABCD (doxil, velcade with eventual switch to ixazomib, cyclophosphamide, dexamethasone) as outpatient soon.   -cont acyclovir prophylaxis given proteasome inhibitor use    *back and R lower abd/flan

## 2017-06-14 NOTE — PLAN OF CARE
Admission navigator completed in ED.  Pt's spouse Guillaume Pastor to review medication list when she arrives at hospital.

## 2017-06-14 NOTE — TELEPHONE ENCOUNTER
Pt wife calling wanting to schedule pt for immidiate \"outpatient procedure of lower back area\", with romario Abraham, informed her our office was unaware of scheduling of any surgical procedures, after looking at Notes from ED which state,      \"Patient seen

## 2017-06-14 NOTE — ED NOTES
Pt asleep on cart and in no distress. Pt's daughter at bedside at this time. Pt with no sign of pain noted.

## 2017-06-14 NOTE — ED NOTES
Report given to [address] [place] [address] [place], RN. Transport contacted at this time.

## 2017-06-14 NOTE — PLAN OF CARE
Pt BP high since admitted from ER, SBP reaching 170-180. Dr. Kristal Resendiz aware and ordered Hydralazine PRN for SBP >160. Pt states that he takes something to help him sleep every night, Dr. Kristal Resendiz paged for order. Ordered Restoril nightly PRN.

## 2017-06-14 NOTE — ED NOTES
Awaiting bed assignment at this time. Pt appears comfortable on cart, asleep. Per daughter, pt wakes intermittently of pain, but pt does not appear in any distress at this time.

## 2017-06-14 NOTE — TELEPHONE ENCOUNTER
Spoke to wife to inform her our office did not call her, and to see if there was anything else she was needing. Nothing further at this time.

## 2017-06-14 NOTE — H&P
[facility]  History and Physical     Monique Cuba Patient Status:  Observation    1950 MRN [id number]   [facility] 3SW-A Attending Marla Jama MD   Hosp Day # 0 PCP Angel Montero MD     Chief Complaint: intractable illicit drugs. Family History:   Family History   Problem Relation Age of Onset   • Cancer Father       of Colon cancer     Allergies: No Known Allergies  Medications:    No current facility-administered medications on file prior to encounter.   Tony Hardy tablet Rfl: 0     Physical Exam:    /71 mmHg  Pulse 51  Temp(Src) 98.3 °F (36.8 °C) (Oral)  Resp 22  SpO2 97%  General: In distress 2/2 pain. Alert and oriented x 3. Respiratory: Good inspiratory effort. Clear to auscultation bilaterally.  No rhonc

## 2017-06-14 NOTE — CONSULTS
[facility]  Interventional Neuroradiology  Consultation Note    Hakeem  Patient Status:  Observation    1950 MRN [id number]   [facility] 3SW-A Attending Aurora Rivas MD   Hosp Day # 0 PCP Dionicio Christine MD     REASON other parts of face 4/3/2012     Basal Cell Carcinoma Face     • Multiple myeloma ([facility] [address]    • Cancer ([facility] [address]      multiple myeloma   • Personal history of antineoplastic chemotherapy        PAST SURGICAL HISTORY:      Past Surgical History    TONSILLECTOMY Or      diphenoxylate-atropine (LOMOTIL) 2.5-0.025 MG per tab 1 tablet 1 tablet Oral QID PRN         REVIEW OF SYSTEMS:  A 10-point system was reviewed. Pertinent positives and negatives are noted in HPI.       PHYSICAL EXAMINATION:  VITAL SIGNS: / stable to minimally more prominent in size compared to prior exam.  2. Metastatic deposit in the posterior right side of the L3 vertebral body has significantly increased in size since prior exam.  3. No high-grade spinal canal stenosis.   4. Chronic superi

## 2017-06-14 NOTE — ED NOTES
Transport here for pt. JAMES Beltre on floor made aware pt was again medicated for pain at this time. Pt to floor with belongings accompanied by family and transporter.

## 2017-06-14 NOTE — ED NOTES
Pt seated upright, c/o increase in pain. Pt appears more uncomfortable. To medicate as per PRN order for pain med at this time.

## 2017-06-14 NOTE — ED PROVIDER NOTES
Patient Seen in: [facility] Emergency Department    History   Patient presents with:  Back Pain (musculoskeletal)    Stated Complaint: back pain    HPI    Patient is a 59-year-old male who presents emergency room with a history of low back pain.   Pa Prochlorperazine Maleate (COMPAZINE) 10 mg tablet,  TAKE 1 TABLET(10 MG) BY MOUTH EVERY 6 HOURS AS NEEDED FOR NAUSEA   Ondansetron HCl (ZOFRAN) 8 MG tablet,  Take 1 tablet (8 mg total) by mouth every 8 (eight) hours as needed for Nausea.    Ixazomib Citrate and agreed except as otherwise stated in HPI.     Physical Exam       ED Triage Vitals   BP 06/14/17 0836 152/93 mmHg   Pulse 06/14/17 0836 73   Resp 06/14/17 0928 16   Temp 06/14/17 0836 98.2 °F (36.8 °C)   Temp src 06/14/17 0836 Temporal   SpO2 06/14/17 0 following:     Creatinine 0.63 (*)     AST 11 (*)     Total Protein 6.0 (*)     Albumin 3.3 (*)     Potassium 3.5 (*)     All other components within normal limits   CBC W/ DIFFERENTIAL - Abnormal; Notable for the following:     RBC 3.67 (*)     HGB 9.5 (* Patient was admitted for further care at this time. Disposition and Plan     Clinical Impression:  Intractable low back pain  (primary encounter diagnosis)    Disposition:  Admit    Follow-up:  No follow-up provider specified.     Medications Prescr

## 2017-06-15 NOTE — DIETARY MALNUTRITION NOTE
NUTRITION INITIAL ASSESSMENT    Pt meets severe malnutrition criteria.     NUTRITION DIAGNOSIS/PROBLEM:    Malnutrition related to inability to consume sufficient energy as evidenced by consuming < 75% for ~ 1 month, and a 10 lb weight loss in 1 month; this 63.277 kg (139 lb 8 oz)  05/17/17 : 68.221 kg (150 lb 6.4 oz)    NUTRITION:  Diet: NPO for possible surgery (usually Reg/Gen)  Oral Supplements: Ensure Enlive BID    FOOD/NUTRITION RELATED HISTORY:  Appetite: Fair  Intake: < 75%  Intake Meeting Needs: No

## 2017-06-15 NOTE — PLAN OF CARE
PAIN - ADULT    • Verbalizes/displays adequate comfort level or patient's stated pain goal Progressing        RISK FOR INFECTION - ADULT    • Absence of fever/infection during anticipated neutropenic period Progressing        SAFETY ADULT - FALL    • Free

## 2017-06-15 NOTE — PROGRESS NOTES
[facility]  Interventional Neuroradiology Progress Note    Melvin Wills Patient Status:  Observation    1950 MRN [id number]   [facility] 3SW-A Attending Adeola Jim MD   Hosp Day # 1 PCP MD Martha Enrique intact  Gait: Deferred    MSK: No pain to palpation along spine, flank, or ribs. Patient currently with no pain. Sitting up in bed comfortably, able to move around.     Inpt Meds:   • Senna-Docusate Sodium  2 tablet Oral BID   • potassium chloride  40 mEq

## 2017-06-15 NOTE — PROGRESS NOTES
Hematology/Oncology Progress Note    Patient Name: Fabiola Yanes  Medical Record Number: [id number]    YOB: 1950     Reason for Consultation:  Fabiola Yanes was seen today for the diagnosis of multiple myeloma, intractable back pain    In Grossly intact     Laboratory:  Recent Labs   Lab  06/13/17   0547  06/14/17   0852   WBC  7.2  9.2   HGB  8.6*  9.5*   HCT  27.4*  29.7*   PLT  217.0  248.0   MCV  80.8  80.9   RDW  16.9*  17.4*   NEPRELIM   --   6.73*       Recent Labs   Lab  06/13/17 off nature of his symptoms is puzzling.   -eval by neurosurg/IR for vertebroplasty ongoing.  Deformities are mild, but if they think it would help, it is reasonable to try it.     -consideration for possible treatment with RT to start after vertebroplasty

## 2017-06-15 NOTE — PROGRESS NOTES
[facility]  Progress Note     Melvin Wills Patient Status:  Observation    1950 MRN [id number]   [facility] 3SW-A Attending Adeola Jim MD   Hosp Day # 1 PCP Gualberto Chambers MD     Chief Complaint: back pain  S:  Fee PLAN:   1. Intractable back pain with Lytic lesions at L4, L5 and S1 and Pathologic compression fractures at L4, L5  1. IR and Oncology to evaluate. possible rad/onc for radiation  2. Multiple Myeloma    3. Hypokalemia- replacing   4.  Dyslipidemia- Statin

## 2017-06-16 NOTE — PROGRESS NOTES
Hematology/Oncology Progress Note    Patient Name: Ralph Pendleton  Medical Record Number: [id number]    YOB: 1950     Reason for Consultation:  Ralph Pendleton was seen today for the diagnosis of multiple myeloma, intractable back pain    In intact     Laboratory:  Recent Labs   Lab  06/14/17   0852  06/16/17   0645   WBC  9.2  7.1   HGB  9.5*  8.9*   HCT  29.7*  28.4*   PLT  248.0  231.0   MCV  80.9  82.3   RDW  17.4*  17.6*   NEPRELIM  6.73*  4.81       Recent Labs   Lab  06/14/17   0852  06 pain  -presumably due to myeloma disease  -vertebroplasty unlikely to help much. I discussed with Dr. Meuhl Lugo and Dr. Jose M Olivares from rad onc today. They will see him and plan to start palliative RT when able.   He will need to get discharged and come as outp

## 2017-06-16 NOTE — CM/SW NOTE
06/16/17 1000   CM/SW Referral Data   Referral Source Other   Reason for Referral Discharge planning   Informant Patient   Readmission Assessment   Factors that patient feels contributed to this readmission Waited too long to seek help;Medications   --- status. He does not think he will need any post acute home care at this time. CM/SW will follow til discharge.

## 2017-06-16 NOTE — PLAN OF CARE
PAIN - ADULT    • Verbalizes/displays adequate comfort level or patient's stated pain goal Progressing        Patient/Family Goals    • Patient/Family Long Term Goal Progressing    • Patient/Family Short Term Goal Progressing        Patient alert and orien

## 2017-06-16 NOTE — TELEPHONE ENCOUNTER
Wife calling with several questions relating to the vertebrae surgery. May be cancelled? Msg to Dr. Yanni Diallo and BONNIE Evans.

## 2017-06-16 NOTE — CONSULTS
[facility] ON[facility]Y FOLLOW UP    PATIENT:   Petar Sunshine      1/13/1950    DIAGNOSIS:   Multiple myeloma      CANCER HISTORY:  57-year-old man with multiple myeloma diagnosed in 2015 under care of Dr. María Stallworth known to our departm Call patient on Monday AM for simulation   RT to L4 and R shoulder, 20 Gy, 10 fractions, 2 weeks   If L4 collapses further, vertebral augmentation can be considered (bone should not be too hard after 20 Gy)    Mateo Jefferson MD  Radiation Oncology    CC: D

## 2017-06-16 NOTE — PROGRESS NOTES
[facility]  Progress Note     Chares Pass Patient Status:  Observation    1950 MRN [id number]   [facility] 3SW-A Attending Jeremy Davis MD   Hosp Day # 2 PCP Edda Diaz MD     Chief Complaint: back pain  S:  Fee lesions at L4, L5 and S1 and Pathologic compression fractures at L4, L5  1. IR and Oncology   2. kyphoplasty today. 3. possible rad/onc for radiation- defer to Dr. Gary Kerr   2. Multiple Myeloma    3. Hypokalemia- replacing   4. Dyslipidemia- Statin  5.  Se

## 2017-06-16 NOTE — PROGRESS NOTES
[facility]  Interventional Neuroradiology Progress Note    Ashli Anderson Patient Status:  Observation    1950 MRN [id number]   [facility] 3SW-A Attending Emmett Jeffrey MD   Hosp Day # 2 PCP MD Rodrigo Perez Oral Daily with dinner   • acyclovir  400 mg Oral BID   • vitamin C  1,000 mg Oral Daily   • cholecalciferol  1,000 Units Oral Daily   • escitalopram  10 mg Oral Daily   • Fluticasone Propionate  1 spray Nasal Daily   • aspirin  81 mg Oral Daily   • atorva degenerative disk disease at L4-L5 and L5-S1 presents with persisting low back pain radiation to the right flank    MRI lumbar spine and CT abdomen/pelvis imaging demonstrate reactive sclerosis in L4>L5 vertebral body with no significant fracture related e

## 2017-06-17 NOTE — PROGRESS NOTES
[facility]  Progress Note     Johanna Florez Patient Status:  Observation    1950 MRN [id number]   [facility] 3SW-A Attending Kwasi Rodriguez MD   Hosp Day # 3 PCP Louise Amaro MD     Chief Complaint: back pain  S:  Fee    3. Hypokalemia- replacing   4. Dyslipidemia- Statin  5.  Severe malnutrition- dietary following     Quality:  · DVT Prophylaxis: scds  · CODE status: full  Estimated date of discharge: later today vs tomorrow   Discharge is dependent on: oncology recs wi

## 2017-06-17 NOTE — PROGRESS NOTES
Hematology/Oncology Progress Note    Patient Name: Fabiola Yanes  Medical Record Number: [id number]    YOB: 1950     Reason for Consultation:  Fabiola Yanes was seen today for the diagnosis of multiple myeloma, intractable back pain    In 0645   K  4.1       No results for input(s): PT, INR, PTT in the last 72 hours. Imaging:    Lumbar spine x ray 6/14/17: FINDINGS:       BONES:  Frontal projection demonstrates slight curvature of the lower lumbar spine, convex to the left.  Straightening senna-S BID  -milk of mag prn  -suppository- he prefers this    Janice Pastor MD

## 2017-06-18 NOTE — DISCHARGE SUMMARY
[facility] [place] [facility]PITAL[facility]  DISCHARGE SUMMARY     Meghann Hassan Patient Status:  Inpatient    1950 MRN [id number]   [facility] 3SW-A Attending Yvrose Caro MD   [facility] Day # 4 PCP Christine Abrams MD     Date of Admission: 2017  Date symptoms. Will reconsider if worsening of compression. Oncology gave dose of steroids with improvement and will have radiation scheduled for tomorrow. Patient cleared for DC.      Procedures during hospitalization:   • None     Incidental or significant Last time this was given:  60 mg on 6/17/2017  9:09 PM   Commonly known as:  MS CONTIN        Take 60 mg by mouth every 12 (twelve) hours.     Refills:  0       morphINE Sulfate IR 15 MG Tabs   Last time this was given:  15 mg on 6/16/2017 10:57 PM   Comm nondistended. Positive bowel sounds. No rebound or guarding. Neurologic: No focal neurological deficits. Musculoskeletal: Moves all extremities. Extremities: No edema.   ----------------------------------------------------------------------------------

## 2017-06-18 NOTE — PROGRESS NOTES
Hematology/Oncology Progress Note    Patient Name: Howard Perry  Medical Record Number: [id number]    YOB: 1950     Reason for Consultation:  Howard Perry was seen today for the diagnosis of multiple myeloma, intractable back pain    In 231.0   MCV  82.3   RDW  17.6*   NEPRELIM  4.81       Recent Labs   Lab  06/16/17   0645   K  4.1       No results for input(s): PT, INR, PTT in the last 72 hours.     Imaging:    Lumbar spine x ray 6/14/17: FINDINGS:       BONES:  Frontal projection demons prn  -suppository- he prefers this    Ok to d/c home from heme standpoint. For simulation Monday.  Rad Onc will call to arrange    Mara Chen MD

## 2017-06-19 NOTE — PROGRESS NOTES
Palliative Care Follow up Note    Patient Name: Elver Garrett   YOB: 1950   Medical Record Number: [id number]   CSN: [id number]   Date of visit: 6/19/2017       Chief Complaint/Reason for Visit:  Patient presents with:   Follow - Up: Palliat acute, epigastric     Insomnia     Smoking trying to quit     Rib pain on right side     Constipation, unspecified     Depression     Anemia, chronic disease     Thrombocytopenia (HCC)     Anemia associated with chemotherapy     Drug-induced acute pancreat History     Works For [employer]      Social History Main Topics   Smoking status: Current Every Day Smoker  0.50 Packs/Day  For 40.00 Years     Types: Cigarettes    Smokeless tobacco: Never Used    Alcohol Use: Yes    Comment: on occasion    Drug Use: N •  acyclovir 400 MG Oral Tab, Take 1 tablet (400 mg total) by mouth 2 (two) times daily. , Disp: 60 tablet, Rfl: 11    Review of Systems:   General:  Fatigue.   Sleepy  HEENT:  Denies pain at L forehead   Neuro:  See HPI  Respiratory:  Denies SOB, nonprodu provided      Impression/Plan:   Pain  1. MS Contin 60mg Q 12  2. Morphine IR 15mg Q 4 prn    Constipation  1. Senna 2 tabs BID  2.  miralax daily    Fatigue  1. May improve with better pain control  2. Activity with rest periods    Fall  1.   CT head

## 2017-06-19 NOTE — TELEPHONE ENCOUNTER
NEEDS NEW REFERRAL TO A PCP IN [place] FOR HIM, THE ONE BEFORE IS NOT TAKING NEW PATIENTS   CALL HOME AND LEAVE MS THEN TRY [facility] [phone]      Previous recommendation: I called Ciara Stephenson and she does not remember the name of who Dr Kennedy Ford had

## 2017-06-20 NOTE — TELEPHONE ENCOUNTER
Returned call to patient. Patient stated pain is better right now. Not sure he needs to see . He will return call if pain returns. Return call from patient. Patient stated pain returned and is excruciating.  Would like to see Dr.Siegel overton

## 2017-06-21 NOTE — PATIENT INSTRUCTIONS
For Dr. Kristi May nurse line, call [phone] with any questions or concerns Monday through Friday 8:00 to 4:30. After hours or weekends for emergent needs [phone].

## 2017-06-21 NOTE — PROGRESS NOTES
Patient here for MD f/u. Began RT today, planning on 10 days. Has Ninlaro tabs but told not to start yet. C/O right side/flank pain. Not sleeping well, decreased appetite. Did have a BM yesterday.   Difficulty initiating urine stream.  Spoke with Stephanie plummer

## 2017-06-26 NOTE — PROGRESS NOTES
[facility] Radiation Treatment Management Note 1-5    Patient:  Eleni Saldana  Age:  69year old  Visit Diagnosis:    1.  Multiple myeloma not having achieved remission ([facility] [address]      Primary Rad/Onc:  Dr. Shila Rivas    Site Delivered

## 2017-06-26 NOTE — PROGRESS NOTES
Palliative Care Follow up Note    Patient Name: Elver Garrett   YOB: 1950   Medical Record Number: [id number]   CSN: [id number]   Date of visit: 6/26/2017       Chief Complaint/Reason for Visit:  Follow up     History of Present Illness: vertebral fracture, initial encounter     Constipation     Intractable low back pain     Compression fracture of lumbar spine, non-traumatic (HCC)     Pathologic fracture of lumbar vertebra     Anemia due to chemotherapy     Severe malnutrition ([facility] [address]     M SLEEPQ, Disp: , Rfl: 3  •  tamsulosin HCl (FLOMAX) 0.4 MG Oral Cap, Take 1 capsule (0.4 mg total) by mouth daily. , Disp: 60 capsule, Rfl: 3  •  morphINE Sulfate IR 15 MG Oral Tab, Take 1 tablet (15 mg total) by mouth every 3 (three) hours as needed for [employer] Rfl: 11    Review of Systems:   General:  Fatigue. Always in pain    Respiratory:  Denies SOB, denies cough  Cardiac:  Denies chest pain, heart palpitations  Abdomen:  Denies diarrhea. Denies pain. Psych:  No complaints.   Not Sleeping well due to pain

## 2017-06-26 NOTE — PROGRESS NOTES
APN education for new protocol  Patient getting palliative RT  For the next two weeks  Today  Oral ninlaro  IV dexamethasone    Next week Monday July 3  Oral ninlaro  IV dexamethasone    Starting July 10, new regimen  Cyclophosphamide  Doxil  Day 15 of kiarra

## 2017-06-28 PROBLEM — R52 INTRACTABLE PAIN: Status: ACTIVE | Noted: 2017-01-01

## 2017-06-28 PROBLEM — M54.50 CHRONIC RIGHT-SIDED LOW BACK PAIN WITHOUT SCIATICA: Status: ACTIVE | Noted: 2017-01-01

## 2017-06-28 PROBLEM — IMO0001: Status: ACTIVE | Noted: 2017-01-01

## 2017-06-28 PROBLEM — G89.29 CHRONIC RIGHT-SIDED LOW BACK PAIN WITHOUT SCIATICA: Status: ACTIVE | Noted: 2017-01-01

## 2017-06-28 PROBLEM — R10.9 ABDOMINAL PAIN OF UNKNOWN ETIOLOGY: Status: ACTIVE | Noted: 2017-01-01

## 2017-06-28 NOTE — H&P
[facility]  History and Physical     Milenajavier Bob Patient Status:  Observation    1950 MRN [id number]   [facility] 4NW-A Attending Graeme Hall MD   Hosp Day # 0 PCP Mitchell Waldron MD     Chief Complaint: Brooks Humbles facility-administered medications on file prior to encounter.    Current Outpatient Prescriptions on File Prior to Encounter:  Zolpidem Tartrate 10 MG Oral Tab TK 1 T PO QD HS PRN FOR SLEEPQ Disp:  Rfl: 3   LACTULOSE 10 GM/15ML Oral Solution TAKE 30 ML BY M HOURS AS NEEDED FOR NAUSEA Disp: 385 tablet Rfl: 3   Ondansetron HCl (ZOFRAN) 8 MG tablet Take 1 tablet (8 mg total) by mouth every 8 (eight) hours as needed for Nausea.  Disp: 30 tablet Rfl: 3       Review of Systems:   A comprehensive 14 point review of s Epic.      ASSESSMENT / PLAN:     1. Intractable Pain  1. conitnue pain control  2. Consider pain service eval if PCA needed  3. Abd US pending  4. Rib Xray ordered  2. Multiple Myeloma  1. Oncology to eval  3.  Dyslipidemia   1. statin      Quality:  · DVT

## 2017-06-28 NOTE — TELEPHONE ENCOUNTER
TC to floor RN Gregorio Ruiz regarding radiation treatment planned for today. Dr Azeem Ortega has been made aware of patients inpatient status, pain level, no new neuro deficits. Per Dr Azeem Ortega ok to proceed with RT today.  Instructed RN of 2:30 pm transport time by [facility] for 2:

## 2017-06-28 NOTE — CM/SW NOTE
06/28/17 1400   CM/SW Screening   Referral Source    Information Source Chart review  (patient)   Patient's Mental Status Alert;Oriented   Patient's [address]   Number of Levels in Home 2   Patient Status Prior to Admission   In

## 2017-06-28 NOTE — ED PROVIDER NOTES
Patient Seen in: [facility] 4nw-a    History   Patient presents with:  Abdomen/Flank Pain (GI/)    Stated Complaint: ABD PAIN    HPI    The patient is a 61-year-old male with a history of multiple myeloma, who had just had radiation therapy in his LACTULOSE 10 GM/15ML Oral Solution,  TAKE 30 ML BY MOUTH TWICE DAILY   tamsulosin HCl (FLOMAX) 0.4 MG Oral Cap,  Take 1 capsule (0.4 mg total) by mouth daily.    morphINE Sulfate IR 15 MG Oral Tab,  Take 1 tablet (15 mg total) by mouth every 3 (three) hours Comment: on occasion      Review of Systems    Positive for stated complaint: ABD PAIN  Other systems are as noted in HPI. Constitutional and vital signs reviewed. All other systems reviewed and negative except as noted above.     PSFH elements review normal limits   CBC W/ DIFFERENTIAL - Abnormal; Notable for the following:     RBC 3.51 (*)     HGB 9.1 (*)     HCT 28.7 (*)     MCH 25.9 (*)     RDW 18.8 (*)     RDW-SD 54.4 (*)     Neutrophil Absolute Prelim 8.14 (*)     Neutrophil Absolute 8.14 (*) to alleviate his pain, and he will need to be admitted for pain control. I discussed the patient with Dr. Dyllan Dietrich he was admitted in stable condition.         Disposition and Plan     Clinical Impression:  Multiple myeloma, remission status unspecified

## 2017-06-28 NOTE — ED INITIAL ASSESSMENT (HPI)
Patient is a 70 yo  male with c/o acute on chronic pain secondary to multiple myeloma. Patient states that he had radiation treatment today at this facility.  Patient states that within 60 min of treatment he developed pain across posterior shoulde

## 2017-06-29 NOTE — PLAN OF CARE
A&Ox4. Reporting right anterior shoulder pain that radiates to his back, scheduled and PRN pain medication administered per orders. Reviewed Dr. Uri Mendoza note with patient and family regarding pain control- expressed understanding.    Patient taken to radia

## 2017-06-29 NOTE — PLAN OF CARE
Continues to have right shoulder and back pain, medicated with iv dilaudid per patient request.iv fluids continued. Port intact.

## 2017-06-29 NOTE — PROGRESS NOTES
Hematology/Oncology Progress Note    Patient Name: Tony Tamez  Medical Record Number: [id number]    YOB: 1950     Reason for Consultation:  Tony Tamez was seen today for the diagnosis of multiple myeloma    Interval events:  Right a bony tenderness    Laboratory:  Recent Labs   Lab  06/26/17   1112  06/28/17   0050   WBC  7.0  9.5   HGB  9.6*  9.1*   HCT  30.8*  28.7*   PLT  224.0  238.0   MCV  83.2  81.8   RDW  18.1*  18.8*   NEPRELIM  5.43  8.14*       Recent Labs   Lab  06/26/17 shoulder  -hopefully this will respond to chemotherapy which will be intensified in 2 weeks as above.  In meantime will need to manage with pains meds- opiates  -cont ms cont 60mg BID, add MS cont 30mg once mid-day.    -encouraged pt to try PO morphine IR 1

## 2017-06-29 NOTE — CONSULTS
Hematology/Oncology Initial Consultation Note    Patient Name: Jolly Ramachandran  Medical Record Number: [id number]    YOB: 1950   Date of Consultation: 6/28/2017   Physician requesting consultation: Dr. Letty Mar    [facility] for [facility] to ED last night with increase in right lower rib pain after RT yesterday. Pain was sharp, 10/10 severity located both anteriorly and posterior at lower rib level. He reports being compliant with BID MS contin 60mg.   Tried taking morphine IR 15mg x 2 dos morphINE Sulfate IR 15 MG Oral Tab Take 1 tablet (15 mg total) by mouth every 3 (three) hours as needed for Pain. Disp: 90 tablet Rfl: 0   morphINE Sulfate ER 60 MG Oral Tab CR Take 1 tablet (60 mg total) by mouth every 12 (twelve) hours.  Disp: 60 tablet escitalopram  10 mg Oral Daily   • Fluticasone Propionate  1 spray Nasal Daily   • lactulose  20 g Oral BID   • morphINE Sulfate ER  60 mg Oral 2 times per day   • Senna  17.2 mg Oral BID   • atorvastatin  10 mg Oral Nightly   • Alfuzosin HCl ER  10 mg Ora Examination:  General: Patient is alert and oriented, not in acute distress  Psych: Mood and affect are appropriate  Eyes: EOMI, PERRL  ENT: Oropharynx is clear  CV: Regular rate and rhythm, no murmurs, no LE edema  Respiratory: Lungs clear to auscultation currently with a max SUV of 29.72 as opposed to 9.34. The left scapular lesion reveals relatively decreased degree of radiotracer uptake with max SUV of 4.72 as opposed to 10. 87.      The prior noted lesion along the right proximal humerus currently reveal reveals increased uptake on the prior study, currently measured at an SUV max of 4.78 as opposed to 2.75.    The prior noted proximal left femoral lesion currently reveals a max SUV of 4.85 as opposed to 15.75 on the prior study, suggesting interval improv ABCD regimen swapping ixazomib for bortezomib) as below:                          -doxil 20mg/m2 IV d1,15                          -ixazomib d1,8,15                          -cyclophosphamide 300mg/m2 IV H2,7,86,77                          -dexamethasone 1

## 2017-06-29 NOTE — PROGRESS NOTES
[facility] HOSPITALIST  Progress Note     Candelario Ray Patient Status:  Observation    1950 MRN [id number]   [facility] 4NW-A Attending Anayeli Sampson MD   Hosp Day # 0 PCP Jeff Wilder MD     Chief Complaint: Pain    S: Patient st g Oral BID   • morphINE Sulfate ER  60 mg Oral 2 times per day   • Senna  17.2 mg Oral BID   • atorvastatin  10 mg Oral Nightly   • Alfuzosin HCl ER  10 mg Oral Daily with breakfast   • enoxaparin  40 mg Subcutaneous Daily       ASSESSMENT / PLAN:     1.  I

## 2017-06-30 NOTE — PATIENT INSTRUCTIONS
POST-RADIATION INSTRUCTIONS:   - CALL [phone] FOR A FOLLOW-UP WITH DR. DOLL IN ONE MONTH  - FOLLOW-UP WITH DR CRUZ AS PLANNED.  YOU HAVE AN APPOINTMENT 7/3 FOR CHEMO, AN APPOINTMENT 7/10 FOR MD VISIT/ CHEM0/PALLIATIVE     CARE NURSE VISIT  - SIDE E

## 2017-06-30 NOTE — PLAN OF CARE
Patient alert and oriented X4. Not in respiratory distress. Per patient his right shoulder pain is now under control, states he feels better now. Scheduled MS contin given as ordered. Vital signs stable. Afebrile. Will continue to monitor.

## 2017-06-30 NOTE — DISCHARGE SUMMARY
[facility] [place] HOSPITALIST  DISCHARGE SUMMARY     Ivania Mensah Patient Status:  Observation    1950 MRN [id number]   [facility] 4NW-A Attending Jesusita Olivas MD   Hosp Day # 0 PCP Won Gannon MD     Date of Admission: 2017  Date as:  MS CONTIN  What changed:  · Another medication with the same name was added. Make sure you understand how and when to take each. · Another medication with the same name was changed. Make sure you understand how and when to take each.       Take 1 tabl Quantity:  5676 mL  Refills:  0     MILK OF MAGNESIA OR      Take by mouth as needed. Refills:  0     Ondansetron HCl 8 MG tablet  Commonly known as:  ZOFRAN      Take 1 tablet (8 mg total) by mouth every 8 (eight) hours as needed for Nausea.    Quantity: rhonchi. Cardiovascular: S1, S2. Regular rate and rhythm. No murmurs, rubs or gallops. Abdomen: Soft, nontender, nondistended. Positive bowel sounds. No rebound or guarding. Neurologic: No focal neurological deficits.    Musculoskeletal: Moves all extr

## 2017-06-30 NOTE — PROGRESS NOTES
[facility] HOSPITAL[facility]  Progress Note     Yusra Barrientos Patient Status:  Observation    1950 MRN [id number]   [facility] 4NW-A Attending Brian Mcelroy MD   Hosp Day # 0 PCP Janay Wu MD     Chief Complaint: Pain    S: Patient st Propionate  1 spray Nasal Daily   • lactulose  20 g Oral BID   • morphINE Sulfate ER  60 mg Oral 2 times per day   • Senna  17.2 mg Oral BID   • atorvastatin  10 mg Oral Nightly   • Alfuzosin HCl ER  10 mg Oral Daily with breakfast   • enoxaparin  40 mg Disla

## 2017-06-30 NOTE — PROGRESS NOTES
Hematology/Oncology Progress Note    Patient Name: Tony Tamez  Medical Record Number: [id number]    YOB: 1950     Reason for Consultation:  Tony Tamez was seen today for the diagnosis of multiple myeloma    Interval events:  Pain is tenderness    Laboratory:  Recent Labs   Lab  06/28/17   0050   WBC  9.5   HGB  9.1*   HCT  28.7*   PLT  238.0   MCV  81.8   RDW  18.8*   NEPRELIM  8.14*       Recent Labs   Lab  06/28/17 0050 06/29/17   0640   NA  145*  144   K  3.8  4.1   CL  106  112 adequate  -stable    *ID  -continue prophylactic acyclovir 400mg BID given on proteosome inhibitor     Discharge today. He will return to clinic next week to see palliative care and for RT. I will see him next on 7/10.        Isaias Mosquera MD  Hematology

## 2017-06-30 NOTE — TELEPHONE ENCOUNTER
Received call from pt's wife. She states that form was faxed twice to office and not returned. Left  for jo in .

## 2017-07-03 NOTE — PROGRESS NOTES
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  Take mid day between the MS contin 60mg doses, Disp: 30 tablet, R daily., Disp: 30 tablet, Rfl: 5  •  Magnesium Hydroxide (MILK OF MAGNESIA OR), Take by mouth as needed. , Disp: , Rfl:   •  Ascorbic Acid (VITAMIN C) 1000 MG Oral Tab, Take 1,000 mg by mouth daily. , Disp: , Rfl:   •  Cholecalciferol (VITAMIN D) 1000 UNITS up:   2 weeks    30 total minutes spent with patient >90% of visit was spent in counseling and coordination of care for symptom management.         Electronically Signed by:  BONNIE Foote, ELSY-CECILE [place] Outpatient Palliative Nurse Practitioner

## 2017-07-03 NOTE — PROGRESS NOTES
[facility] Radiation Treatment Management Note 6-10    Patient:  Serg Bonilla  Age:  69year old  Visit Diagnosis:    1.  Multiple myeloma not having achieved remission ([facility] [address]      Primary Rad/Onc:  Dr. Edilma Miranda

## 2017-07-10 NOTE — PROGRESS NOTES
Patient here for MD f/u. C/O increased abd pain, radiated to right side and back. Taking MS with some relief. Seeing Hannah CHAKRABORTY Today. Decreased appetite, increased fatigue, denies SOB.

## 2017-07-10 NOTE — PATIENT INSTRUCTIONS
For Dr. Jaki Mathews nurse line, call [phone] with any questions or concerns Monday through Friday 8:00 to 4:30. After hours or weekends for emergent needs [phone].

## 2017-07-10 NOTE — PROGRESS NOTES
Palliative Care Follow up Note    Patient Name: Johanna Florez   YOB: 1950   Medical Record Number: [id number]   [facility]: [id number]   Date of visit: 7/10/2017       Chief Complaint/Reason for Visit:  Palliative follow up     History of Present Pathologic lumbar vertebral fracture, initial encounter     Constipation     Intractable low back pain     Compression fracture of lumbar spine, non-traumatic (HCC)     Pathologic fracture of lumbar vertebra     Anemia due to chemotherapy     Severe malnut previously            Medications:    Current Outpatient Prescriptions:   •  fentaNYL 25 MCG/HR Transdermal Patch 72 Hr, Place 1 patch onto the skin every third day., Disp: 10 patch, Rfl: 0  •  morphINE Sulfate IR 15 MG Oral Tab, Take 1-2 tablets (15-30 mg Disp: , Rfl:   •  acyclovir 400 MG Oral Tab, Take 1 tablet (400 mg total) by mouth 2 (two) times daily. , Disp: 60 tablet, Rfl: 11    Review of Systems:   General:  Fatigue. Feels well.     Respiratory:  Denies SOB, denies cough  Cardiac:  Denies chest pain

## 2017-07-10 NOTE — PROGRESS NOTES
Hematology/Oncology Clinic Follow Up Note    Patient Name: Agnieszka Murillo Record Number: [id number]    YOB: 1950   PCP: Dr. Candace Myers  Other providers: BONNIE Montez (palliative care), Dr. Damaris Tsai (psychologist), Presents for follow up and to start the doxil and cyclophosphamide as above. He started the ixazomib 2 weeks earlier since it came early. His pain has been ongoing, taking ms contin 60mg BID, never started the 30mg ms contin to regimen mid-day.   Taking m (60 mg total) by mouth every 12 (twelve) hours. Disp: 60 tablet Rfl: 0   guaiFENesin 100mg/5ml Take 50 mg by mouth every 6 (six) hours as needed. Disp:  Rfl:    Senna 8.6 MG Oral Tab Take 2 tablets (17.2 mg total) by mouth 2 (two) times daily.  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 26-1.65   5/31/2017 167.49 (H)   5/10/2017 119.63 (H)   4/19/2017 577.91 (H)   3/22/2017    3/2/2017 487.43 (H)   2/9/2017 274.12 (H)   1/7/2017 42.96 (H)   12/19/2016 97.55 (H)   11/21/2016 57.55 (H)   10/17/2016 11.67 (H)   9/26/2016 8/8/2016 57.05 significantly improved.    The prior noted sternal lesion reveals increased uptake with a SUV max of 24.22 as opposed to 17.29 on the prior exam. The prior noted multiple rib lesions are again seen and all reveal significantly increased max SUV numbers.  As sacrum. 3. There are however a few lesions that have decreased in radiotracer uptake, and one lesion along the left mid humerus which has resolved.   4. There is one new lesion of increased FDG uptake along a right lumbar lamina inferiorly, presumably L5 w care today.   Will stop MS contin, switch to fentanyl patch starting at 25mcg/hr, continue morphine IR 30mg q3hrs prn  -palliative care will continue to follow with him weekly for now    *Bone-    -multiple active bone lesions on relapse  -continue zometa I

## 2017-07-11 NOTE — TELEPHONE ENCOUNTER
Date of Treatment: 7/10/17                                Type of Chemo: ABCD    Comments: Spoke with patient and wife. He states that he \"feels lousy. \" Geronimo OK this am, this afternoon her did have a pain in his back for about 20 mins.  Pain is almost dara

## 2017-07-11 NOTE — TELEPHONE ENCOUNTER
Received call from patient. He states that medication verification form regarding co-payment was sent to the office. Message to BONNIE Wang to have MD only sign per pt.

## 2017-07-12 NOTE — TELEPHONE ENCOUNTER
Zev Rodriguez called stating Henry's pain is not well controlled with fentanyl 25mcg patch. He is only using 1 -2 doses of morphine IR 30mg a day with some benefit. He is not optimizing morphine due to fear of SE.   Patient is awake, alert,  He didn't sleep last

## 2017-07-13 NOTE — TELEPHONE ENCOUNTER
Flavia Guerrero called stating increase in fentanyl and morphine haven't been helpful for pain. The pain is most severe in RUQ and stomach area. She states he has been complaining more of this pain and nothing is helpful for relief.    Patient states he has been mov

## 2017-07-13 NOTE — ED INITIAL ASSESSMENT (HPI)
Abdominal pain x3 days, worsening today. N/o nausea or vomiting. Afebrile. New cancer treatment this past Monday.

## 2017-07-13 NOTE — H&P
[facility] HOSPITALIST  History and Physical     [facility] Patient Status:  Emergency    1950 MRN [id number]   Location [address] [facility] Attending Moriah Kim MD   Hosp Day # 0 PCP Christine Abrams MD     Chief Complain drugs. Family History:   Family History   Problem Relation Age of Onset   • Cancer Father       of Colon cancer       Allergies: No Known Allergies    Medications:    No current facility-administered medications on file prior to encounter.    Current (VITAMIN C) 1000 MG Oral Tab Take 1,000 mg by mouth daily. Disp:  Rfl:    Cholecalciferol (VITAMIN D) 1000 UNITS Oral Tab Take 1,000 Units by mouth daily. Disp:  Rfl:    acyclovir 400 MG Oral Tab Take 1 tablet (400 mg total) by mouth 2 (two) times daily.  D Possibly due to constipation, to start bowel regimen. Continue with home pain meds. Oncology to see. 2. Multiple myeloma  3. HL    Quality:  · DVT Prophylaxis: Lovenox  · CODE status: Full code  · [place]: None    Plan of care discussed with Patient.     A

## 2017-07-13 NOTE — ED PROVIDER NOTES
Patient Seen in: [facility] Emergency Department    History   Patient presents with:  Abdomen/Flank Pain (GI/)    Stated Complaint: abdominal pain    HPI    58-year-old male complaining of abdominal pain the patient has a history of multiple myelo Oral Solution,  Take 20 g by mouth 2 (two) times daily. fentaNYL 25 MCG/HR Transdermal Patch 72 Hr,  Place 1 patch onto the skin every third day.    morphINE Sulfate IR 15 MG Oral Tab,  Take 1-2 tablets (15-30 mg total) by mouth every 3 (three) hours as n reviewed from today and agreed except as otherwise stated in HPI.     Physical Exam   ED Triage Vitals [07/13/17 1203]  BP: 132/89  Pulse: 80  Resp: 18  Temp: (!) 97.2 °F (36.2 °C)  Temp src: Temporal  SpO2: 100 %  O2 Device: None (Room air)    Current:BP 1 limits   LIPASE - Normal   POTASSIUM - Normal   CBC WITH DIFFERENTIAL WITH PLATELET    Narrative: The following orders were created for panel order CBC WITH DIFFERENTIAL WITH PLATELET.   Procedure                               Abnormality         Status Admission  Date Reviewed: 7/3/2017          ICD-10-CM Noted POA    * (Principal)Abdominal pain of unknown etiology R10.9 6/28/2017 Unknown    Cancer associated pain G89.3 Unknown     Constipation due to opioid therapy K59.03, T40.2X5A Unknown Unknown    Mu

## 2017-07-14 NOTE — DIETARY MALNUTRITION NOTE
NUTRITION INITIAL ASSESSMENT    Pt is at moderate nutrition risk. Pt meets malnutrition criteria.     NUTRITION DIAGNOSIS/PROBLEM:    Malnutrition related to inability to consume sufficient energy and increased physiological needs as evidenced by pt report 64.2 kg (141 lb 9.6 oz). This is 99.5 % of IBW  BMI: Body mass index is 22.85 kg/m².   IBW: 64.5 kg      WEIGHT HISTORY:   Wt Readings from Last 30 Encounters:  07/13/17 : 64.2 kg (141 lb 9.6 oz)  07/10/17 : 64.2 kg (141 lb 8 oz)  07/03/17 : 64.9 kg (143 lb Maintain lean body mass    MEDICATIONS:  Noted    LABS:  Noted    FOLLOW-UP DATE: 7/18/17    Kim Arambula  Dietetic [place]

## 2017-07-14 NOTE — PLAN OF CARE
NURSING DISCHARGE NOTE    Discharged Home via Ambulatory.   Accompanied by spouse  Belongings sent with patient

## 2017-07-14 NOTE — PLAN OF CARE
Constipation    • Interventions for Selected Goals Progressing        Pain, Altered Comfort    • Interventions for Selected Goals Progressing        Comfortable at this time. Abdominal pain is controlled with scheduled pain medication. Had 1 small  BM after

## 2017-07-14 NOTE — PLAN OF CARE
Dulcolax suppository given with moderate result. Potasium replaced iv per protocol. Declined prn pain meds. Port intact.

## 2017-07-14 NOTE — CONSULTS
Hematology/Oncology Initial Consultation Note    Patient Name: Cesar Amador  Medical Record Number: [id number]    YOB: 1950   Date of Consultation: 7/14/2017   Physician requesting consultation: Dr. Ji Way    Reason for Consultation: with hx of multiple myeloma well known to me presented to ED with worsening abdominal pain. He recently started a new chemotherapy regimen as above. Also switched from ms cont to fentanyl patch earlier this week.  Took morphine IR at home at increasing do patch Rfl: 0   morphINE Sulfate IR 15 MG Oral Tab Take 1-2 tablets (15-30 mg total) by mouth every 3 (three) hours as needed for Pain. Disp: 90 tablet Rfl: 0   tamsulosin HCl (FLOMAX) 0.4 MG Oral Cap Take 1 capsule (0.4 mg total) by mouth daily.  Disp: 60 c **OR** HYDROmorphone HCl PF, PEG 3350, magnesium hydroxide, bisacodyl, FLEET ENEMA, ondansetron HCl, Heparin Lock Flush    Allergies:   No Known Allergies    Psychosocial History:    Social History  Social History   Marital status:   Spouse name: N/ hepatosplenomegaly  Neurological: grossly intact.       Lymphatics: No palpable lymphadenopathy  Skin: no rashes or petechiae  MSK: No spinal tenderness    Laboratory:  Recent Labs   Lab  07/13/17   1215  07/14/17   0600   WBC  6.0  4.7   HGB  10.2*  9.0* degenerative changes of spine and hips. Soft tissue mass surrounding lateral right seventh rib is incompletely visualized   but was present on 3/26/17. Stable healing fracture right ninth rib.   LUNG BASES:  Stable pleural based mass posterior medially in r prn.  He should be off the MS contin at this point.   -will f/u with palliative care for this on Monday.      *Heme  -+anemia due to myeloma and chemo effects, remains adequate  -following CBC prior to each chemo dose     *ID  -continue prophylactic acyclov

## 2017-07-15 NOTE — DISCHARGE SUMMARY
[facility] HOSPITALIST  DISCHARGE SUMMARY     Walter Liu Patient Status:  Inpatient    1950 MRN [id number]   [facility] 4NW-A Attending No att. providers found   [facility] Day [facility] 1 PCP Abbey James MD     Date of Admission: 2017 at Discharge:   · None    Consultants:  • Dr. Negrita Guerrero    Discharge Medication List:     Discharge Medications      CONTINUE taking these medications      Instructions Prescription details   acyclovir 400 MG Tabs  Commonly known as:  ZOVIRAX      Take 1 tabl Refills:  0     tamsulosin HCl 0.4 MG Caps  Commonly known as:  FLOMAX      Take 1 capsule (0.4 mg total) by mouth daily. Stop taking on:  7/22/2017  Quantity:  60 capsule  Refills:  3     vitamin C 1000 MG Tabs      Take 1,000 mg by mouth daily.    Refil

## 2017-07-17 PROBLEM — G47.9 DIFFICULTY SLEEPING: Status: ACTIVE | Noted: 2017-01-01

## 2017-07-19 PROBLEM — Z51.5 PALLIATIVE CARE BY SPECIALIST: Status: ACTIVE | Noted: 2017-01-01

## 2017-07-19 NOTE — PROGRESS NOTES
Palliative Care Follow up Note    Patient Name: Elver Garrett   YOB: 1950   Medical Record Number: [id number]   CSN: [id number]   Date of visit: 7/19/2017       Chief Complaint/Reason for Visit:  abdominal pain     History of Present Illnes Bone pain     Intractable pain     Shoulder pain, bilateral     Hyperglycemia     Acute kidney injury ([facility] [address]     Dehydration     Renal insufficiency     Pathologic lumbar vertebral fracture     Pathologic lumbar vertebral fracture, initial encounter     Con Smokeless tobacco: Never Used    Comment: recent / stress related    Alcohol use Yes    Comment: on occasion    Drug use: No    Sexual activity: Not on file     Other Topics Concern    Exercise Yes    Comment: up to last couple of weeks     Social Histo as needed for Nausea., Disp: 30 tablet, Rfl: 3  •  aspirin 81 MG Oral Tab, Take 81 mg by mouth daily. , Disp: , Rfl:   •  Fluticasone Propionate (FLONASE) 50 MCG/ACT Nasal Suspension, 1 spray by Nasal route daily. , Disp: 1 Bottle, Rfl: prn  •  escitalopram Pain  1. Continue fentanyl patch 25mcg  2. D/C morphine IR  3. Oxycodone IR 5-10mg Q 3 prn  4. Lidocaine patch to ribs  5. Bentyl 10mg TID prn    Constipation  1. Senna 2 tabs BID  2. Lactulose BID  3.   Dulcolax suppository prn    Planned Follow u

## 2017-07-24 NOTE — PROGRESS NOTES
Pt arrived for APN f/u and chemo treatment, pt states feeling better than usual but still having increased fatigue, pt alert and appears in nad, pt ambulates slowly but with steady gait  Education Record    Learner:  Patient and Spouse    Disease / [place]

## 2017-07-24 NOTE — PROGRESS NOTES
Today was cycle 1 day 15. . Told them to take ninlaro today. . Technically he would hold next week, but mrs. Mentioned you told them to take. . I told them not to take next week until they see you first to clarify. . They have one pill left. . Not sure how.

## 2017-07-24 NOTE — PROGRESS NOTES
CC:Patient presents with: Follow - Up: cycle 1 day 15 cyclophosphamdie and Doxil      HPI:   Malou Silvestre is a(n) 69year old male followed by Dr. Florence Putnam for multiple myeloma  He has failed a variety of previous treatments.   [facility]    LABS        Recent Results (from the past 24 hour(s))  -COMP METABOLIC PANEL (14)   Collection Time: 07/24/17 10:28 AM   Result Value Ref Range   Glucose 123 (H) 70 - 99 mg/dL   BUN 10 8 - 20 mg/dL   Creatinine 0.59 (L) 0.70 - 1.30 mg/dL   GF 25mcg, prn oxycodone, meeting with palliative care APN today    3.  Anemia, counts stable    Patient instructions  Continue same medications    Follow up:  Next week for day 22    Decision making:complex     30   Minutes were spent with the patient and alena

## 2017-07-25 NOTE — PROGRESS NOTES
Palliative Care Follow up Note    Patient Name: Elver Garrett   YOB: 1950   Medical Record Number: [id number]   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 MG Oral Tab, Take 1,000 mg by mouth daily. , Disp: , Rfl:   •  Cholecalciferol (VITAMIN D) 1000 UNITS Oral Tab, Take 1,000 Units by mouth daily. , Disp: , Rfl:   •  acyclovir 400 MG Oral Tab, Take 1 tablet (400 mg total) by mouth 2 (two) times daily. , Disp: lexapro      Planned Follow up:   1 week    30 total minutes spent with patient >90% of visit was spent in counseling and coordination of care for symptom management.         Electronically Signed by:  BONNIE Rodriguez, ELSY-BC  [address]

## 2017-07-28 NOTE — PROGRESS NOTES
[facility]    PATIENT:  Glenn Hdez MD: Dr. Omar Rodarte    DIAGNOSIS:  Multiple myeloma    CANCER HISTORY:  42-year-old man with multiple myeloma diagnosed in 2015 with history of palliative r

## 2017-07-31 NOTE — PROGRESS NOTES
Palliative Care Follow up Note    Patient Name: Hakeem Brown   YOB: 1950   Medical Record Number: [id number]   CSN: [id number]   Date of visit: 7/31/2017       Chief Complaint/Reason for Visit:  Palliative followup     History of Present I insufficiency     Pathologic lumbar vertebral fracture     Pathologic lumbar vertebral fracture, initial encounter     Constipation     Intractable low back pain     Compression fracture of lumbar spine, non-traumatic (HCC)     Pathologic fracture of lumba Sexual activity: Not on file     Other Topics Concern    Exercise Yes    Comment: up to last couple of weeks     Social History Narrative    He is not working, on medical leave of absence, 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 , Disp: 1 Bottle, Rfl: prn  •  escitalopram (LEXAPRO) 10 MG Oral Tab, Take 1 tablet (10 mg total) by mouth daily. , Disp: 30 tablet, Rfl: 5  •  Ascorbic Acid (VITAMIN C) 1000 MG medication for pain prn    Constipation - improved  1. Senna 2 tabs BID  2.  miralax prn  3.   Lactulose prn      Planned Follow up:   1 week    30 total minutes spent with patient >90% of visit was spent in counseling and coordination of care for symptom

## 2017-07-31 NOTE — PROGRESS NOTES
Patient here for MD f/u and treatment. C/O increased pain, upper abd and back/right side. On Fentanyl patch and or MS, stats not much relief. Denies nausea/emesis. Some diarrhea, controlled. Decreased appetite, increased fatigue.

## 2017-07-31 NOTE — PATIENT INSTRUCTIONS
For Dr. Jose Ryder nurse line, call [phone] with any questions or concerns Monday through Friday 8:00 to 4:30. After hours or weekends for emergent needs [phone].

## 2017-07-31 NOTE — PROGRESS NOTES
PALLIATIVE CARE NP REQUESTED RD MEET W/ PT AS PER CONTINUED GRADUAL WT LOSS    Nutrition Consultation    Patient Name: Mandy Noel  YOB: 1950  Medical Record Number: [id number]   Account Number: [de-identified]  Dietitian: Александр Alonzo RD needed. , Disp: 90 capsule, Rfl: 0  •  bisacodyl (DULCOLAX) 10 MG Rectal Suppos, Place 10 mg rectally daily as needed. , Disp: , Rfl:   •  Ixazomib Citrate 4 MG Oral Cap, Take 4 mg by mouth.  WEEKLY FOR 3 WEEKS, THEN OFF FOR 1 WEEK---EVERY 28 DAYS, Disp: , Rf written ix provided addressing - taste changes; simple ways to increase protein/calories; samples of Ensure Enlive and coupons     Assessment/Plan: RD met briefly w/ pt during his txl. Pt appeared in pain as was wincing and holding R lower abdominal area.

## 2017-07-31 NOTE — PROGRESS NOTES
Hematology/Oncology Clinic Follow Up Note    Patient Name: Agnieszka Murillo Record Number: [id number]    YOB: 1950   PCP: Dr. Abran Villar  Other providers: BONNIE Tovar (palliative care), Dr. Tamra Boston (psychologist), and for chemotherapy, C1D22 of AICD as above. He is feeling poorly today. Reports midline lower abd pain and lower back pain. Achy and relentless the last 3 days. No radiation, no sharp or burning. Not like prior pancreatitis pain.   Taking fentanyl 25 HCl 5 MG Oral Tab Take 1-2 tablets (5-10 mg total) by mouth every 3 (three) hours as needed for Pain. Disp: 120 tablet Rfl: 0   Dicyclomine HCl 10 MG Oral Cap Take 1 capsule (10 mg total) by mouth 3 (three) times daily as needed.  Disp: 90 capsule Rfl: 0 Works For [employer]      Social History Main Topics   Smoking status: Current Every Day Smoker  0.50 Packs/day  For 40.00 Years     Types: Cigarettes    Smokeless tobacco: Never Used    Comment: recent / stress related    Alcohol use Yes    Comment: on occasion 07/24/2017   HGB 9.8 (L) 07/17/2017   HCT 31.3 (L) 07/31/2017   MCV 83.9 07/31/2017   MCH 27.3 07/31/2017   MCHC 32.6 07/31/2017   RDW 19.2 (H) 07/31/2017   .0 07/31/2017   .0 07/24/2017   .0 (L) 07/17/2017       Lab Results  Component 4/19/2017 60.103 (H) 0.104 (L)   3/22/2017 41.781 (H) <0.060 (L)   3/2/2017 34.120 (H) 0.070 (L)   2/9/2017 20.011 (H) 0.073 (L)   1/7/2017 19.463 (H) 0.453 (L)   12/19/2016 18.925 (H) 0.194 (L)   11/21/2016 8.862 (H) 0.154 (L)   10/17/2016 1.015 0.087 ( 10.87.  The prior noted lesion along the right proximal humerus currently reveals a max SUV of 8.32 as opposed to 6.07 which is slightly increased.  Prior noted left coracoid lesion currently reveals a max SUV of 8.73 as opposed to 8.99 which is relatively of 4.85 as opposed to 15.75 on the prior study, suggesting interval improvement. Right proximal femoral lesion currently reveals a max SUV of 10.55 as opposed to 17.16, also suggesting interval improvement.    =====  CONCLUSION:     1.  There are numerous r cancer  -s/p palliative RT as above  -pain is uncontrolled today. He continues to be reluctant to take prn opiates as instructed. Discussed with Bertrand Dodge from palliative care today.     -increase fentanyl patch to 50mcg/hr, encouraged to take the oxycodo

## 2017-08-01 PROBLEM — M84.48XA PATHOLOGIC LUMBAR VERTEBRAL FRACTURE, INITIAL ENCOUNTER: Status: RESOLVED | Noted: 2017-01-01 | Resolved: 2017-01-01

## 2017-08-01 PROBLEM — D64.9 ANEMIA: Status: RESOLVED | Noted: 2017-01-01 | Resolved: 2017-01-01

## 2017-08-01 PROBLEM — M25.511 SHOULDER PAIN, BILATERAL: Status: RESOLVED | Noted: 2017-01-01 | Resolved: 2017-01-01

## 2017-08-01 PROBLEM — M84.48XA PATHOLOGIC LUMBAR VERTEBRAL FRACTURE: Status: RESOLVED | Noted: 2017-01-01 | Resolved: 2017-01-01

## 2017-08-01 PROBLEM — M54.59 INTRACTABLE LOW BACK PAIN: Status: RESOLVED | Noted: 2017-01-01 | Resolved: 2017-01-01

## 2017-08-01 PROBLEM — N17.9 ACUTE KIDNEY INJURY (HCC): Status: RESOLVED | Noted: 2017-01-01 | Resolved: 2017-01-01

## 2017-08-01 PROBLEM — M54.50 CHRONIC RIGHT-SIDED LOW BACK PAIN WITHOUT SCIATICA: Status: RESOLVED | Noted: 2017-01-01 | Resolved: 2017-01-01

## 2017-08-01 PROBLEM — R52 INTRACTABLE PAIN: Status: RESOLVED | Noted: 2017-01-01 | Resolved: 2017-01-01

## 2017-08-01 PROBLEM — M89.8X9 BONE PAIN: Status: RESOLVED | Noted: 2017-01-01 | Resolved: 2017-01-01

## 2017-08-01 PROBLEM — S22.41XA CLOSED FRACTURE OF MULTIPLE RIBS OF RIGHT SIDE, INITIAL ENCOUNTER: Status: RESOLVED | Noted: 2017-01-01 | Resolved: 2017-01-01

## 2017-08-01 PROBLEM — R79.89 AZOTEMIA: Status: RESOLVED | Noted: 2017-01-01 | Resolved: 2017-01-01

## 2017-08-01 PROBLEM — M25.512 SHOULDER PAIN, BILATERAL: Status: RESOLVED | Noted: 2017-01-01 | Resolved: 2017-01-01

## 2017-08-01 PROBLEM — R10.9 ABDOMINAL PAIN OF UNKNOWN ETIOLOGY: Status: RESOLVED | Noted: 2017-01-01 | Resolved: 2017-01-01

## 2017-08-01 PROBLEM — N28.9 RENAL INSUFFICIENCY: Status: RESOLVED | Noted: 2017-01-01 | Resolved: 2017-01-01

## 2017-08-01 PROBLEM — R73.9 HYPERGLYCEMIA: Status: RESOLVED | Noted: 2017-01-01 | Resolved: 2017-01-01

## 2017-08-01 PROBLEM — G89.29 CHRONIC RIGHT-SIDED LOW BACK PAIN WITHOUT SCIATICA: Status: RESOLVED | Noted: 2017-01-01 | Resolved: 2017-01-01

## 2017-08-02 PROBLEM — D64.9 NORMOCYTIC ANEMIA: Status: ACTIVE | Noted: 2017-01-01

## 2017-08-02 PROBLEM — R10.9 ABDOMINAL PAIN: Status: ACTIVE | Noted: 2017-01-01

## 2017-08-02 PROBLEM — R10.9 INTRACTABLE ABDOMINAL PAIN: Status: ACTIVE | Noted: 2017-01-01

## 2017-08-02 NOTE — ED INITIAL ASSESSMENT (HPI)
Patient c/o 3 days severe abd pains with slight radiation to back. Denies constipation.  Denies n/v.

## 2017-08-02 NOTE — H&P
[facility] HOSPITALIST  History and Physical     Johanna Florez Patient Status:  Emergency    1950 MRN [id number]   Location [address] [facility] Attending Kaylin Olivia MD   Hosp Day # 0 PCP Louise Amaro MD     Chief Complain smoking Cigarettes. He has a 20.00 pack-year smoking history. He has never used smokeless tobacco. He reports that he drinks alcohol. He reports that he does not use drugs.     Family History:   Family History   Problem Relation Age of Onset   • Cancer Fat for Nausea. Disp: 30 tablet Rfl: 3   aspirin 81 MG Oral Tab Take 81 mg by mouth daily. Disp:  Rfl:    Fluticasone Propionate (FLONASE) 50 MCG/ACT Nasal Suspension 1 spray by Nasal route daily.  Disp: 1 Bottle Rfl: prn   escitalopram (LEXAPRO) 10 MG Oral Tab K  3.8  3.6   CL  111  110   CO2  24.0  23.0   ALKPHO  79  70   AST  12*  14*   ALT  13*  11*   BILT  0.2  0.2   TP  6.4  6.1       Estimated Creatinine Clearance: 96.3 mL/min (based on SCr of 0.65 mg/dL).     No results for input(s): PTP, INR in the last

## 2017-08-02 NOTE — ED PROVIDER NOTES
Patient Seen in: [facility] Emergency Department    History   Patient presents with:  Abdomen/Flank Pain (GI/)  Chemo Clearance    Stated Complaint: ABDOMINAL PAIN    HPI    Patient is a pleasant 25-year-old male, presenting for evaluation of abdo MOUTH TWICE DAILY. PATIENT TAKING EVERY OTHER DAY   OxyCODONE HCl 5 MG Oral Tab,  Take 1-2 tablets (5-10 mg total) by mouth every 3 (three) hours as needed for Pain.    Dicyclomine HCl 10 MG Oral Cap,  Take 1 capsule (10 mg total) by mouth 3 (three) times d complaint: ABDOMINAL PAIN  Other systems are as noted in HPI. Constitutional and vital signs reviewed. All other systems reviewed and negative except as noted above. PSFH elements reviewed from today and agreed except as otherwise stated in HPI. All other components within normal limits   LIPASE - Normal   CBC WITH DIFFERENTIAL WITH PLATELET    Narrative: The following orders were created for panel order CBC WITH DIFFERENTIAL WITH PLATELET.   Procedure                               Abnormali imaging was performed. Dose reduction techniques were used. Dose information is transmitted to the [facility] [employer]) NRDR ([address]) which includes the Dose Index Registry.   PATIENT STATED HISTORY:(As transcribed ============================================================  ED Course  ------------------------------------------------------------     MDM     Patient was placed on a cart, an IV was established, and blood was drawn and sent to the laboratory for fu

## 2017-08-03 PROBLEM — Z51.5 PALLIATIVE CARE BY SPECIALIST: Status: ACTIVE | Noted: 2017-01-01

## 2017-08-03 NOTE — CONSULTS
[facility]                       Gastroenterology Consultation-[facility]    Sharif Lucia Patient Status:  Observation    1950 MRN [id number]   [facility] 4NW-A Attending Philip Coelho MD   Hosp Day # 0 [facility] radiation    • Family history of colon cancer 5/11/2015   • GERD (gastroesophageal reflux disease)    • History of acute pancreatitis    • Hyperlipidemia    • Impaired fasting glucose 5/20/2014   • Multiple myeloma ([facility] [address]     7/2015 biopsy of rib lesion   • HYDROmorphone HCl PF (DILAUDID) 1 MG/ML injection 0.5 mg 0.5 mg Intravenous Q2H PRN   Or      HYDROmorphone HCl PF (DILAUDID) 1 MG/ML injection 1 mg 1 mg Intravenous Q2H PRN   [COMPLETED] HYDROmorphone HCl PF (DILAUDID) 1 MG/ML injection 1 mg 1 mg Shilpa Cheung kg)   SpO2 99%   BMI 22.39 kg/m²   Gen: AAO x 3, able to speak in complete sentences  HENT: EOMI, PERRL, oropharynx is clear with moist mucosal membranes  Eyes: Sclerae are anicteric  Neck:  Supple without nuchal rigidity  CV: Regular rate and rhythm, with distended loops of colon are noted. CALCIFICATIONS:  None significant.     =====  CONCLUSION:  No free air. Nonspecific air-filled distended loops of colon are noted. No evidence of bowel obstruction.            Dictated by: Estevan Sprague MD on 8/02/2 distended. No visible focal wall thickening, lesion, or calculus. PELVIC NODES:  Normal.  No adenopathy. PELVIC ORGANS:  Normal.  No visible mass. Pelvic organs appropriate for patient age.     BONES:  Stable mixed osteolytic and osteosclerotic lesi patient, agree with the above findings, assessment and plan. Mr. Raciel Bernal is a 69year-old male who is being seen in consultation for chronic abdominal pain. His abdomen is soft and non tender. He is non-toxic appearing and in no acute distress.  Fady Salazar

## 2017-08-03 NOTE — PLAN OF CARE
Pt discharged to home alertx4 d/c instructions given to pt. Pt was seen by palliative care Gi follow up arranged. Pt left unit with belongings.

## 2017-08-03 NOTE — CONSULTS
[address] [place] [address] Rd  [id number]  Hospital Day # 0  Date of Consult:  8/3/2017    Reason for Consultation:  Consult requested by Dr. Jo-Ann Mckeon for evaluation of palliative care needs and pain control.     History of Present Illne History: Allergies:  No Known Allergies    Medications:  Complete list reviewed. Active palliative care medications include fentanyl patch 50mcg, oxycodone 10mg, IV dilaudid, naproxen. Review of Systems:    General:  Feel better.   Psych: anxious to

## 2017-08-03 NOTE — CONSULTS
Hematology/Oncology Initial Consultation Note    Patient Name: Laura Torres  Medical Record Number: [id number]    YOB: 1950   Date of Consultation: 8/3/2017   Physician requesting consultation: Dr. Solange Govea    Reason for Consultation: with hx of multiple myeloma well known to me presented to ED with worsening abdominal pain. I saw him on Monday this week and his pain was worse, but he wasn't increasing his prn oxycodone as instructed.   His fentanyl patch was increased from 25-> 50mcg/h APPENDECTOMY  8/17/15: BONE MARROW ASPIRATE &BIOPSY  No date: PORT, INDWELLING, IMP      Comment: left  No date: TONSILLECTOMY    Home Medications:    No current facility-administered medications on file prior to encounter.    Current Outpatient Prescriptio mg total) by mouth daily. Disp: 30 tablet Rfl: 5   Ascorbic Acid (VITAMIN C) 1000 MG Oral Tab Take 1,000 mg by mouth daily. Disp:  Rfl:    Cholecalciferol (VITAMIN D) 1000 UNITS Oral Tab Take 1,000 Units by mouth daily.  Disp:  Rfl:    acyclovir 400 MG Oral 2048)  BSA (Calculated - sq m): 1.71 sq meters (08/02 2048)  Pulse: 56 (08/03 0451)  BP: 133/64 (08/03 0451)  Temp: 98 °F (36.7 °C) (08/03 0451)  Do Not Use - Resp Rate: --  SpO2: 99 % (08/03 0451)    Wt Readings from Last 6 Encounters:  08/02/17 : 62.9 kg No mass or enlargement. AORTA/VASCULAR:  Stable infrarenal aortic ectasia measuring up to 2.7 cm. Extensive atherosclerotic calcifications. RETROPERITONEUM:  Normal.  No mass or adenopathy. BOWEL/MESENTERY:  Moderate colonic stool.  No bowel distenti added to dragan+velcade x 2 cycles however PET/CT showed mixed response though clinical progression with pain in right shoulder, ribs, and lower back.    -completed 2 weeks of palliative RT to right shoulder and lumbar spine on 7/5/17.  -due to start cycle 2

## 2017-08-03 NOTE — PROGRESS NOTES
NURSING ADMISSION NOTE      Patient admitted via Cart  Oriented to room. Safety precautions initiated. Bed in low position. Call light in reach. A&O times 4. Wife at bedside. Admitted with intractable lower ab pain. Hx of multiple myeloma.  Onc

## 2017-08-03 NOTE — PROGRESS NOTES
[facility] HOSPITALIST  Progress Note     Elver Garrett Patient Status:  Observation    1950 MRN [id number]   [facility] 4NW-A Attending Jeff Conteh MD   Hosp Day # 0 PCP Leah Tovar MD     Chief Complaint: Abdominal pain    S: ASSESSMENT / PLAN:     1. Abdominal pain:  Etiology unclear, ?IBD, will refer to gi as an outpatient. Advance diet. 2. Multiple Myeloma  3. HL  4. GERD    Plan of care: Advance diet, possible home later today.     Quality:  · DVT Prophylaxis: Chas Galeana

## 2017-08-03 NOTE — CM/SW NOTE
08/03/17 1000   CM/SW Referral Data   Referral Source Social Work (self-referral)   Reason for Referral Discharge planning   Informant Patient   Patient Info   Patient's Mental Status Alert;Oriented   Patient's [address]   Number of Levels

## 2017-08-04 NOTE — DISCHARGE SUMMARY
[facility]OSPITAL[facility]  DISCHARGE SUMMARY     Monique Cuba Patient Status:  Observation    1950 MRN [id number]   [facility] 4NW-A Attending No att. providers found   Hosp Day # 0 PCP Angel Montero MD     Date of Admission: 2017 Mate[facility] Hazel, Dr. Blackwell [facility]    Discharge Medication List:     Discharge Medications      START taking these medications      Instructions Prescription details   docusate sodium 100 MG Caps  Commonly known as:  COLACE      Take 1 capsule (100 mg total) by mouth 2 tablet  Refills:  3     OxyCODONE HCl 5 MG Tabs  Commonly known as:  ROXICODONE      Take 1-2 tablets (5-10 mg total) by mouth every 3 (three) hours as needed for Pain.    Quantity:  120 tablet  Refills:  0     Prochlorperazine Maleate 10 mg tablet  Commonl Moves all extremities. Extremities: No edema.   -----------------------------------------------------------------------------------------------  PATIENT DISCHARGE INSTRUCTIONS: See electronic chart    Sin Cruz MD 8/4/2017    Time spent:  > 30 minutes

## 2017-08-07 PROBLEM — R11.0 NAUSEA: Status: ACTIVE | Noted: 2017-01-01

## 2017-08-07 NOTE — PROGRESS NOTES
Hematology/Oncology Clinic Follow Up Note    Patient Name: Agnieszka Murillo Record Number: [id number]    YOB: 1950   PCP: Dr. Felicitas Weber  Other providers: BONNIE Brothers (palliative care), Dr. Yonatan Chen (psychologist), for chemotherapy, C2D1 of AICD as above. Was hospitalized again from 8/2-8/3 last week with intractable lower abdominal pain. Since being home he has been feeling much better.   Taking naproxen BID, fentanyl 50mcg/hr patch, and oxycodone 20mg prn- about 3 EVERY OTHER DAY Disp: 3040 mL Rfl: 5   OxyCODONE HCl 5 MG Oral Tab Take 1-2 tablets (5-10 mg total) by mouth every 3 (three) hours as needed for Pain.  Disp: 120 tablet Rfl: 0   Dicyclomine HCl 10 MG Oral Cap Take 1 capsule (10 mg total) by mouth 3 (three) Main Topics   Smoking status: Current Every Day Smoker  0.50 Packs/day  For 40.00 Years     Types: Cigarettes    Smokeless tobacco: Never Used    Comment: recent / stress related    Alcohol use Yes    Comment: on occasion    Drug use: No    Sexual activity HGB 10.2 (L) 07/31/2017   HCT 30.6 (L) 08/07/2017   MCV 85.0 08/07/2017   MCH 27.5 08/07/2017   MCHC 32.4 08/07/2017   RDW 19.8 (H) 08/07/2017   .0 08/07/2017   .0 08/02/2017   .0 07/31/2017       Lab Results  Component Value Date 0.104 (L)   3/22/2017 41.781 (H) <0.060 (L)   3/2/2017 34.120 (H) 0.070 (L)   2/9/2017 20.011 (H) 0.073 (L)   1/7/2017 19.463 (H) 0.453 (L)   12/19/2016 18.925 (H) 0.194 (L)   11/21/2016 8.862 (H) 0.154 (L)   10/17/2016 1.015 0.087 (L)   9/26/2016 20.224 ( lesion along the right proximal humerus currently reveals a max SUV of 8.32 as opposed to 6.07 which is slightly increased.  Prior noted left coracoid lesion currently reveals a max SUV of 8.73 as opposed to 8.99 which is relatively stable to slightly decre 15.75 on the prior study, suggesting interval improvement. Right proximal femoral lesion currently reveals a max SUV of 10.55 as opposed to 17.16, also suggesting interval improvement.    =====  CONCLUSION:     1.  There are numerous regions of abnormal FDG above  -pain is well controlled today- continue fentanyl patch 50mcg/hr, oxycodone 10-20mg prn, naproxen BID.    -addition of marinol as below may help as well    *poor appetite  -trial of marinol 2.5mg BID    *recurrent pancreatitis  -concern could be due

## 2017-08-07 NOTE — PATIENT INSTRUCTIONS
For Dr. Zuly Sanchez nurse line, call [phone] with any questions or concerns Monday through Friday 8:00 to 4:30. After hours or weekends for emergent needs [phone].

## 2017-08-07 NOTE — PROGRESS NOTES
Patient here for MD f/u. States little better pain control with increased fentanyl patch. Still in right side. Decreased appetite, not sleeping well. Currently no constipation or diarrhea.

## 2017-08-07 NOTE — PROGRESS NOTES
Pt here for treatment. He is complaining of hip pain today, [facility] came to examine patient. He tolerated treatment without incident.     Education Record    Learner:  Patient    Disease / Diagnosis:    Barriers / Limitations:  None   Comments:    Method:  Meliza Villar

## 2017-08-07 NOTE — PROGRESS NOTES
NUTRITION F/U NOTE:     DX:  multiple myeloma     TX: doxil, ixazomib, cyclophosphamide, dexamethasone     MEDS: dronabinol    OTHER: Noted oncologist documenting \"poor PO intake, dehydration, lightheadedness-encouraged to increase PO intake\"    Wt Readi

## 2017-08-08 NOTE — PROGRESS NOTES
Palliative Care Follow up Note    Patient Name: Laura Torres   YOB: 1950   Medical Record Number: [id number]   CSN: [id number]   Date of visit: 8/8/2017       Chief Complaint/Reason for Visit:  Palliative follow up     History of Present I Past Medical History:   Diagnosis Date   • Allergic rhinitis    • Anemia associated with chemotherapy 11/12/2015   • Back problem    • Basal cell carcinoma of skin of other and unspecified parts of face 4/3/2012   • Basal cell carcinoma of skin of other tablet, Rfl: 3  •  fentaNYL 50 MCG/HR Transdermal Patch 72 Hr, Place 1 patch onto the skin every third day., Disp: 10 patch, Rfl: 0  •  LACTULOSE 10 GM/15ML Oral Solution, TAKE 30 ML BY MOUTH TWICE DAILY.  PATIENT TAKING EVERY OTHER DAY, Disp: 3040 mL, Rfl: times daily. , Disp: 60 tablet, Rfl: 11    Review of Systems:   General:  Fatigue. Feels well. Respiratory:  Denies SOB, denies cough  Cardiac:  Denies chest pain, heart palpitations  Abdomen:  Denies constipation, diarrhea. Denies pain.   Normal BM pat Nurse Practitioner

## 2017-08-09 PROBLEM — R10.9 INTRACTABLE ABDOMINAL PAIN: Status: RESOLVED | Noted: 2017-01-01 | Resolved: 2017-01-01

## 2017-08-09 PROBLEM — R63.0 POOR APPETITE: Status: ACTIVE | Noted: 2017-01-01

## 2017-08-09 PROBLEM — R10.9 ABDOMINAL PAIN: Status: RESOLVED | Noted: 2017-01-01 | Resolved: 2017-01-01

## 2017-08-09 NOTE — TELEPHONE ENCOUNTER
Matt Cohn called stating patient felt great yesterday and went out friends. Today he has had some increased pain in his abdomen/ R rib area. He took some lactulose and just has a BM and states the pain has much improved.   He also took an additional dose of ox

## 2017-08-14 PROBLEM — Z51.5 PALLIATIVE CARE BY SPECIALIST: Status: RESOLVED | Noted: 2017-01-01 | Resolved: 2017-01-01

## 2017-08-14 NOTE — PROGRESS NOTES
Education Record    Learner:  Patient    Disease / Diagnosis: Multiple Myeloma    Barriers / Limitations:  None   Comments:    Method:  Brief focused and Reinforcement   Comments:    General Topics:  Pain, Side effects and symptom management, Plan of care

## 2017-08-14 NOTE — PROGRESS NOTES
Palliative Care Follow up Note    Patient Name: Fabiola Yanes   YOB: 1950   Medical Record Number: [id number]   CSN: [id number]   Date of visit: 8/14/2017       Chief Complaint/Reason for Visit:  Palliative care follow up     History of Pre Face     • Cancer [facility])     multiple myeloma   • Cataract    • Depression    • Exposure to radiation    • Family history of colon cancer 5/11/2015   • GERD (gastroesophageal reflux disease)    • History of acute pancreatitis    • Hyperlipidemia    • Impair capsule (100 mg total) by mouth 2 (two) times daily. , Disp: 60 capsule, Rfl: 5  •  naproxen 500 MG Oral Tab, Take 1 tablet (500 mg total) by mouth 2 (two) times daily with meals. , Disp: 60 tablet, Rfl: 3  •  LACTULOSE 10 GM/15ML Oral Solution, TAKE 30 ML B diarrhea. Denies pain. Normal BM pattern  Psych:  No complaints. Not Sleeping well      Palliative Performance Scale:  70%    Physical Examination:   General: alert and oriented, not in acute distress. Respiratory: Clear to auscultation.   Cardiac: Regu

## 2017-08-14 NOTE — PROGRESS NOTES
Per BONNIE Mcneil to give 2nd dose of Dilaudid 2 mg IV prior to patient leaving eventhough it has not been 2 hours since the last dose.

## 2017-08-21 NOTE — PROGRESS NOTES
Pt here for doxil and cytoxan today. He is having pain around his right side of the chest and following the administration of 2mg of dilaudid, pt does state he had quite a bit of relief.     Education Record    Learner:  Patient    Disease / Diagnosis:

## 2017-08-21 NOTE — PROGRESS NOTES
Hematology/Oncology Clinic Follow Up Note    Patient Name: Agnieszka Murillo Record Number: [id number]    YOB: 1950   PCP: Dr. Wendel Alpers  Other providers: BONNIE Monteiro (palliative care), Dr. Brien Ghosh (psychologist), for chemotherapy, C2D15 of AICD as above. He reports increased RLQ abd pain below ribs since last night which he attributes to constipation pain. Admits to not being consistent with bowel regimen, ran out of lactulose yesterday, not taking miralax.   Has mg total) by mouth nightly as needed for Sleep. Disp: 30 tablet Rfl: 3   fentaNYL 75 MCG/HR Transdermal Patch 72 Hr Place 1 patch onto the skin every third day.  Disp: 10 patch Rfl: 0   gabapentin 300 MG Oral Cap Take 1 capsule (300 mg total) by mouth night Rfl:    Cholecalciferol (VITAMIN D) 1000 UNITS Oral Tab Take 1,000 Units by mouth daily.  Disp:  Rfl:      Allergies:   No Known Allergies    Psychosocial History:    Social History  Social History   Marital status:   Spouse name: N/A    Years of edu tenderness without mass or guarding  Neurological: grossly intact. Lymphatics: No palpable lymphadenopathy  Skin: no rashes or petechiae  MSK: right anterior rib tenderness.  No spinal or shoulder tenderness    Laboratory:    Lab Results  Component Sia 8/8/2016 Faint IgG K on DIOR   4/25/2016 No monoclonal protein on SPEP or DIOR   11/10/2015 0.19 (H)   10/20/2015 0.23 (H)   9/28/2015 0.42 (H)   8/5/2015 1.18 (H)       Component KAPPA FREE LIGHT CHAIN LAMBDA FREE LIGHT CHAIN   Latest Ref Rng 0.330-1.940 of abnormal uptake along the scapula bilaterally, the prior noted right scapular lesion along the scapular spine reveals significant interval increase in uptake as well as suggestion of increase in size, currently with a max SUV of 29.72 as opposed to 9.34 has significantly increased in radiotracer uptake, currently with a max SUV of 16.24 as opposed to 4.40 on the prior study.  The prior noted lesion along the left posterior column of the acetabulum reveals increased uptake on the prior study, currently katarzyna (essentially modified ABCD regimen swapping ixazomib for bortezomib, thus calling it AICD).   Will proceed with C2D15 today as below:   -doxil 20mg/m2 IV d1,15   -ixazomib 4mg PO d1,8,15   -cyclophosphamide 300mg/m2 IV B8,7,95,97   -dexamethasone 10mg IV d1

## 2017-08-21 NOTE — PROGRESS NOTES
Palliative Care Follow up Note    Patient Name: Eleni Saldana   YOB: 1950   Medical Record Number: [id number]   CSN: [id number]   Date of visit: 8/21/2017       Chief Complaint/Reason for Visit:  Palliative care follow up     History of Pre carcinoma of skin of other parts of face 4/3/2012    Basal Cell Carcinoma Face     • Cancer [facility])     multiple myeloma   • Cataract    • Depression    • Exposure to radiation    • Family history of colon cancer 5/11/2015   • GERD (gastroesophageal reflux d capsule, Rfl: 3  •  dronabinol 2.5 MG Oral Cap, Take 1 capsule (2.5 mg total) by mouth 2 (two) times daily before meals. , Disp: 60 capsule, Rfl: 0  •  docusate sodium 100 MG Oral Cap, Take 1 capsule (100 mg total) by mouth 2 (two) times daily. , Disp: 60 ca denies cough  Cardiac:  Denies chest pain, heart palpitations  Abdomen:  constipated  Psych:  No complaints. Sleeping well      Palliative Performance Scale: 70%        Physical Examination:   General: alert and oriented, not in acute distress.   Respirato management.         Electronically Signed by:  BONNIE Beavers, FNP-BC  THE [facility] Outpatient Palliative Nurse Practitioner

## 2017-08-21 NOTE — PATIENT INSTRUCTIONS
For Dr. Blayne Morton nurse line, call [phone] with any questions or concerns Monday through Friday 8:00 to 4:30. After hours or weekends for emergent needs [phone].

## 2017-08-21 NOTE — PROGRESS NOTES
[facility] Medicine Progress Note  Date of Service: 04/06/2017    Assessment & Plan   Mirian Cruz is a 57 year old female who presented on 4/5/2017 for scheduled Procedure(s):  ARTHROPLASTY KNEE by Zelalem Mendez MD and is being followed by the hospital medicine service for co-management of acute and/or chronic perioperative medical problems.    S/p Procedure(s):  ARTHROPLASTY KNEE  - POD #1  - pain control, wound cares, physical therapy, occupational therapy and DVT prophylaxis per orthopedic surgery service    Positive Staci's to Left LE  Surgical Side.  Low suspicion given nature of pain is mild, no swelling, no erythema.  - duplex of LLE ordered this AM    Prediabetes  BG reviewed, stable without medication    Tobacco use disorder  Nicotine patch available, pt declining at this point in time    GERD (gastroesophageal reflux disease)  - continue PTA omeprazole    Overactive Bladder  - continue PTA myrbetriq      DVT Prophylaxis: as per orthopedic surgery service - Defer to primary service  Code Status: Full Code    Lines: PIV, without edema/erythema   House catheter: pulled this AM    Discussion: Medically, the patient appears stable.    Disposition: Anticipate discharge in 2-3 days.  Pt plans to discharge to home with homecare.  She lives alone, but has a 23 year old grandson who will be living with her immediately after surgery..     Attestation:  I have reviewed today's vital signs, notes, medications, labs and imaging.    Sydney Raza PA-C      Interval History   Pain is present, but controlled.  Denies fever, CP, SOB, cough, abdominal pain, dysuria, and lower extremity pain/swelling at rest.  Passing flatus.  Voiding freely.  Has not yet been evaluated by PT/OT.  Smokes 5 cigarettes daily.  No alcohol use at home.    Physical Exam   Temp:  [97.5  F (36.4  C)-98.8  F (37.1  C)] 97.6  F (36.4  C)  Pulse:  [64-80] 70  Heart Rate:  [66-71] 70  Resp:  [12-18]  Patient here for MD f/u and treatment. C/O continued pain in righ tabd that radiates to the back. Using Fentanyl patch 75 mcg. Says having trouble keeping it on. Hannah buchanan. He is seeing Seble Almaraz in clinic today. 16  BP: (103-139)/(48-88) 115/51  SpO2:  [96 %-99 %] 97 %    Weights:   Vitals:    04/05/17 1034   Weight: 106.6 kg (235 lb)    Body mass index is 40.34 kg/(m^2).    General: alert and oriented x4, in no acute distress  CV: Distant heart sounds given body habitus. Regular rate/rhythm, no appreciable murmur, rub, or gallop  Respiratory: CTA bilaterally, equal chest expansion  GI: Soft, nontender, normoactive BS  Skin: Warm and dry  Musculoskeletal: Positive homans to LLE.  Mildly tender to palpation of LLE.  Right LE non-tender and negative homans to this side.  No edema to lower extremities.  Neuro: equal  strength    Data     Recent Labs  Lab 04/06/17  0620 04/05/17  1052   WBC  --  6.2   HGB 11.4* 14.4   MCV  --  87   PLT  --  177   INR  --  0.92   CR 0.72  --    *  --          Recent Labs  Lab 04/06/17  0620   *        Unresulted Labs Ordered in the Past 30 Days of this Admission     No orders found for last 61 day(s).           Imaging  Recent Results (from the past 24 hour(s))   XR Knee Port Left 1/2 Views    Narrative    XR KNEE PORT LT 1/2 VW 4/5/2017 2:46 PM    HISTORY: Postop total knee arthroplasty    COMPARISON: 9/9/2010.      Impression    IMPRESSION: 2 views of the left knee show an arthroplasty in place.  Hardware appears well seated.     JONNIE VELAZQUEZ MD        I reviewed all new labs and imaging results over the last 24 hours. I personally reviewed no images or EKG's today.    Medications     NaCl 125 mL/hr at 04/06/17 0200       mirabegron  50 mg Oral Daily     omeprazole  40 mg Oral Daily     sodium chloride (PF)  3 mL Intracatheter Q8H     enoxaparin  40 mg Subcutaneous Q24H     ranitidine  150 mg Oral BID     acetaminophen  975 mg Oral Q8H     ketorolac  30 mg Intravenous Q6H     gabapentin  300 mg Oral BID     nicotine   Transdermal Q8H     nicotine   Transdermal Daily     nicotine  1 patch Transdermal Daily     pneumococcal vaccine  0.5 mL Intramuscular Prior to discharge        Sydney Raza PA-C

## 2017-08-28 NOTE — PROGRESS NOTES
Palliative Care Follow up Note    Patient Name: Yusra Barrientos   YOB: 1950   Medical Record Number: [id number]   CSN: [id number]   Date of visit: 8/28/2017       Chief Complaint/Reason for Visit:  Palliative care follow up     History of Pre Carcinoma Face     • Cancer [facility])     multiple myeloma   • Cataract    • Depression    • Exposure to radiation    • Family history of colon cancer 5/11/2015   • GERD (gastroesophageal reflux disease)    • History of acute pancreatitis    • Hyperlipidemia mg total) by mouth 2 (two) times daily before meals. , Disp: 60 capsule, Rfl: 0  •  docusate sodium 100 MG Oral Cap, Take 1 capsule (100 mg total) by mouth 2 (two) times daily. , Disp: 60 capsule, Rfl: 5  •  naproxen 500 MG Oral Tab, Take 1 tablet (500 mg to Denies constipation, diarrhea. Denies pain. Normal BM pattern  Psych:  No complaints. Sleeping well      Palliative Performance Scale:  70%     Physical Examination:   General:  alert and oriented, not in acute distress.   Respiratory: Clear to ausculta

## 2017-08-28 NOTE — PROGRESS NOTES
Education Record    Learner:  Patient    Disease / Diagnosis:    Barriers / Limitations:  None   Comments:    Method:  Discussion   Comments:    General Topics:  Side effects and symptom management, Plan of care reviewed and Fall risk and prevention   Comm

## 2017-08-28 NOTE — PROGRESS NOTES
PER RN, PT DECLINED RD VISIT TODAY, w/ RN NOTING PT STATING, \"IT JUST DOESN'T MATTER. \"    NUTRITION F/U NOTE:    DX:  multiple myeloma      TX: doxil, ixazomib, cyclophosphamide, dexamethasone      MEDS: dronabinol     Wt Readings from Last 6 Encounters:

## 2017-08-29 NOTE — TELEPHONE ENCOUNTER
Prudence Turner, from [facility] calling for clearance letter for surgery from Dr. Kimberley Mcneal. Message forward to Social Work [employer].

## 2017-08-29 NOTE — PROGRESS NOTES
ARTURO prepared letter to [facility] at [phone] clearing patient for eye surgery.

## 2017-08-29 NOTE — PROGRESS NOTES
Education Record    Learner:  Patient    Disease / Diagnosis: Multiple Myeloma; Pain    Barriers / Limitations:  None   Comments:    Method:  Brief focused and Reinforcement   Comments:    General Topics:  Pain, Side effects and symptom management, Plan of

## 2017-08-29 NOTE — TELEPHONE ENCOUNTER
Eugenio Guillen, pt's wife calling re: pt increase pain. Wanting to come to office for IV pain medicine. Pt having BMs. Discussed with SONAM Young- orders received for pt to come to cancer ctr for IV dilaudid and APN visit. Appt time given to wife.  Manford Peabody, charge RN no

## 2017-08-29 NOTE — PROGRESS NOTES
Palliative Care Follow up Note    Patient Name: Patrick Grady   YOB: 1950   Medical Record Number: [id number]   CSN: [id number]   Date of visit: 8/29/2017       Chief Complaint/Reason for Visit:  Abdominal pain     History of Present Illnes 11/12/2015   • Back problem    • Basal cell carcinoma of skin of other and unspecified parts of face 4/3/2012   • Basal cell carcinoma of skin of other parts of face 4/3/2012    Basal Cell Carcinoma Face     • Cancer (HCC)     multiple myeloma   • Cataract tablet, Rfl: 3  •  fentaNYL 75 MCG/HR Transdermal Patch 72 Hr, Place 1 patch onto the skin every third day., Disp: 10 patch, Rfl: 0  •  gabapentin 300 MG Oral Cap, Take 1 capsule (300 mg total) by mouth nightly., Disp: 30 capsule, Rfl: 3  •  dronabinol 2.5 mg by mouth daily. , Disp: , Rfl:   •  Cholecalciferol (VITAMIN D) 1000 UNITS Oral Tab, Take 1,000 Units by mouth daily. , Disp: , Rfl:     Review of Systems:   General:  Fatigue.      Respiratory:  Denies SOB, denies cough  Cardiac:  Denies chest pain, heart

## 2017-08-31 NOTE — TELEPHONE ENCOUNTER
Call from Kimmy Prather, patient's wife who states patient continues to have abdominal pain and is having trouble eating and sleeping due to pain. She has increased the oxycodone dose to 10mg and the frequency with little benefit.     Will have patient come for ass

## 2017-08-31 NOTE — PROGRESS NOTES
Patient here for labs, dilaudid and IV Zometa. Patient to see [facility] APN as well.      Education Record    Learner:  Patient    Disease / Diagnosis: MM    Barriers / Limitations:  None   Comments:    Method:  Discussion   Comments:    General Topics:  Lissett Henson

## 2017-08-31 NOTE — PATIENT INSTRUCTIONS
Stop senna  Stop miralax  Stop lactulose    Continue amitiza twice a day    Increase fentanyl patch to 100mcg use (75mcg + 25mcg)    Increase gabapentin to twice a day

## 2017-09-01 NOTE — PROGRESS NOTES
Palliative Care Follow up Note    Patient Name: Patrick Grady   YOB: 1950   Medical Record Number: [id number]   CSN: [id number]   Date of visit: 9/1/2017       Chief Complaint/Reason for Visit:  Palliative care follow up for pain     Histor of other parts of face 4/3/2012    Basal Cell Carcinoma Face     • Cancer [facility])     multiple myeloma   • Cataract    • Depression    • Exposure to radiation    • Family history of colon cancer 5/11/2015   • GERD (gastroesophageal reflux disease)    • Histo gabapentin 300 MG Oral Cap, Take 1 capsule (300 mg total) by mouth nightly., Disp: 30 capsule, Rfl: 3  •  dronabinol 2.5 MG Oral Cap, Take 1 capsule (2.5 mg total) by mouth 2 (two) times daily before meals. , Disp: 60 capsule, Rfl: 0  •  docusate sodium 100 Review of Systems:   General:  Fatigue. Respiratory:  Denies SOB, denies cough  Cardiac:  Denies chest pain, heart palpitations  Abdomen:  Denies diarrhea. Denies pain. Normal BM pattern  Psych:  No complaints.   Sleeping well      Palliative Perf

## 2017-09-05 NOTE — PATIENT INSTRUCTIONS
Increase fentanyl patch to 150mcg. Can use 2 (75mcg) patches until new prescription is filled. Increase oxycodone to 20mg every 3 hours as needed.   Use 2 (10mg ) tablets

## 2017-09-05 NOTE — PROGRESS NOTES
Palliative Care Follow up Note    Patient Name: Adolph Hernandez   YOB: 1950   Medical Record Number: [id number]   CSN: [id number]   Date of visit: 9/5/2017       Chief Complaint/Reason for Visit:  pain     History of Present Illness:   66y ol cell carcinoma of skin of other and unspecified parts of face 4/3/2012   • Basal cell carcinoma of skin of other parts of face 4/3/2012    Basal Cell Carcinoma Face     • Cancer ([facility] [address]     multiple myeloma   • Cataract    • Depression    • Exposure to radia Take 1 tablet (400 mg total) by mouth 2 (two) times daily. , Disp: 60 tablet, Rfl: 11  •  Zolpidem Tartrate 10 MG Oral Tab, Take 1 tablet (10 mg total) by mouth nightly as needed for Sleep., Disp: 30 tablet, Rfl: 3  •  gabapentin 300 MG Oral Cap, Take 1 cap (LEXAPRO) 10 MG Oral Tab, Take 1 tablet (10 mg total) by mouth daily. , Disp: 30 tablet, Rfl: 5  •  Ascorbic Acid (VITAMIN C) 1000 MG Oral Tab, Take 1,000 mg by mouth daily. , Disp: , Rfl:   •  Cholecalciferol (VITAMIN D) 1000 UNITS Oral Tab, Take 1,000 Unit visit was spent in counseling and coordination of care for symptom management.         Electronically Signed by:  BONNIE Byrnes, ELSY-BC  [facility] Outpatient Palliative Nurse Practitioner

## 2017-09-06 NOTE — PATIENT INSTRUCTIONS
For Dr. Baldomero Valentin nurse line, call [phone] with any questions or concerns Monday through Friday 8:00 to 4:30. After hours or weekends for emergent needs [phone].

## 2017-09-06 NOTE — PROGRESS NOTES
Patient here for MD f/u. Was in yesterday to see Hannah. Received Dilaudid IV for pain and states completely resolved today. Continued fatigue and weakness. Continued decreased appetite. BM yesterday.

## 2017-09-06 NOTE — PROGRESS NOTES
Hematology/Oncology Clinic Follow Up Note    Patient Name: Agnieszka Murillo Record Number: [id number]    YOB: 1950   PCP: Dr. Sheron Fischer  Other providers: BONNIE Sue (palliative care), Dr. Malgorzata Franco (psychologist), Presents for follow up. Repeat PET/CT shows further progression of disease. He has been continuing to struggle with right rib pains and poor sleep. Yesterday saw BONNIE Byrnes and received IVF and dilaudid IV which he states worked very well.   He slept Disp:  Rfl:    acyclovir 400 MG Oral Tab Take 1 tablet (400 mg total) by mouth 2 (two) times daily. Disp: 60 tablet Rfl: 11   Zolpidem Tartrate 10 MG Oral Tab Take 1 tablet (10 mg total) by mouth nightly as needed for Sleep.  Disp: 30 tablet Rfl: 3   gabape tablet (10 mg total) by mouth daily. Disp: 30 tablet Rfl: 5   Ascorbic Acid (VITAMIN C) 1000 MG Oral Tab Take 1,000 mg by mouth daily. Disp:  Rfl:    Cholecalciferol (VITAMIN D) 1000 UNITS Oral Tab Take 1,000 Units by mouth daily.  Disp:  Rfl:      Allergie bowel sounds, no hepatosplenomegaly. No abd tenderness  Neurological: grossly intact. Lymphatics: No palpable lymphadenopathy  Skin: no rashes or petechiae  MSK: right anterior rib tenderness.  No spinal or shoulder tenderness    Laboratory:    Lab Res 8/8/2016 Faint IgG K on DIOR   4/25/2016 No monoclonal protein on SPEP or DIOR   11/10/2015 0.19 (H)   10/20/2015 0.23 (H)   9/28/2015 0.42 (H)   8/5/2015 1.18 (H)     Component KAPPA FREE LIGHT CHAIN LAMBDA FREE LIGHT CHAIN   Latest Ref Rng & Units 0.330 ribs including the right fourth, sixth, right seventh, right ninth, right 10th and at the costovertebral junction of the right 10th th rib. The abnormal activity ranges between SUV is 5.58 and 20.28.  There is a pathologic fracture of the anterior right fou lytic defect measures 3.5 x 1.3 cm and the maximum SUV is 16.52. There is also a lytic destructive lesion in the lower sacrum. There is a soft tissue mass associated   with the destructive lesion measuring 3.1 x 2.5 cm. In maximum SUV is 8.63.  There is an consistent with known history of multiple myeloma. . Compared with previous PET CT, there has been marked interval increase in size of the soft tissue component of the expansile lesion of the right lateral seventh rib and at the costovertebral junction at T pursuing any of this, but agreeable to trying another line of treatment here in a few weeks.      *pain due to cancer  -referral for more palliative RT, this time to right ribs  -pain from cancer is well controlled today- continue fentanyl patch, oxycodone

## 2017-09-07 NOTE — PROGRESS NOTES
Nursing Consultation Note  Patient: Jina Carreno  YOB: 1950  Age: 69year old  Radiation Oncologist: Dr. Deepthi Soria  Referring Physician: Janice Morgan  Diagnosis:No diagnosis found.   Consult Date: 9/7/2017    History of Present chemotherapy    • Tubular adenoma of colon 5/11/2015   Surgical History:Past Surgical History:  No date: ADENOIDECTOMY  No date: APPENDECTOMY  8/17/15: BONE MARROW ASPIRATE &BIOPSY  No date: PORT, INDWELLING, IMP      Comment: left  No date: TONSILLECTOMY lubiprostone 24 MCG Oral Cap Take 24 mcg by mouth 2 (two) times daily with meals. Disp:  Rfl:    acyclovir 400 MG Oral Tab Take 1 tablet (400 mg total) by mouth 2 (two) times daily.  Disp: 60 tablet Rfl: 11   Zolpidem Tartrate 10 MG Oral Tab Take 1 tablet Negative. Musculoskeletal: Positive for back pain. Skin: Negative. Neurological: Positive for dizziness. Endo/Heme/Allergies: Negative. Psychiatric/Behavioral: Negative.       Vital Signs: /69 (BP Location: Left arm, Patient Position: Sit

## 2017-09-07 NOTE — PROGRESS NOTES
[facility] RADIATION ONCOLOGY FOLLOW UP    PATIENT:  [place] MD: Dr. Maurice Bruner  DIAGNOSIS:  Multiple myeloma    CANCER HISTORY:  78-year-old man with multiple myeloma diagnosed in 2015 with history of palliative radiation t Advised palliative radiotherapy, over 2 weeks, to the right rib lesions that are painful, and the lesion on the right costovertebral junction at T10. We will check plain films of the right femur to assess bone stability.   He has systemic therapy options a

## 2017-09-07 NOTE — PATIENT INSTRUCTIONS
- FOLLOW-UP WITH Dr Norris NICOLE as planned    - CT SIMULATION SCAN SCHEDULED FOR __________________________________________      - IF YOU HAVE ANY QUESTIONS OR CONCERNS, [address] [place] [address] [phone]

## 2017-09-12 NOTE — TELEPHONE ENCOUNTER
Call from Guillaume Pastor stating pain is well controlled now but patient is more sleepy. He is tolerating sitting in chair but continues to have trouble sleeping at times. He has a poor appetite but is eating donuts.  Instructed to use ensure at least twice a day

## 2017-09-14 NOTE — PROGRESS NOTES
Palliative Care Follow up Note    Patient Name: Eleni Saldana   YOB: 1950   Medical Record Number: [id number]   CSN: [id number]   Date of visit: 9/14/2017       Chief Complaint/Reason for Visit:  Pain.   Unable to complete RT    History of P of skin of other parts of face 4/3/2012    Basal Cell Carcinoma Face     • Cancer [facility])     multiple myeloma   • Cataract    • Depression    • Exposure to radiation    • Family history of colon cancer 5/11/2015   • GERD (gastroesophageal reflux disease) total) by mouth 2 (two) times daily. , Disp: 60 tablet, Rfl: 11  •  Zolpidem Tartrate 10 MG Oral Tab, Take 1 tablet (10 mg total) by mouth nightly as needed for Sleep., Disp: 30 tablet, Rfl: 3  •  gabapentin 300 MG Oral Cap, Take 1 capsule (300 mg total) by Scale:  70%    Physical Examination:   General: alert and oriented, dozes during conversation. Respiratory: Clear to auscultation. Cardiac: Regular rate and rhythm. No edema  Abdomen: Soft, non tender with good bowel sounds.   Psych: Mood/Affect appropr

## 2017-09-15 PROBLEM — N17.9 ACUTE RENAL FAILURE (ARF) (HCC): Status: ACTIVE | Noted: 2017-01-01

## 2017-09-15 NOTE — CONSULTS
Hematology/Oncology Initial Consultation Note    Patient Name: Agnieszka Murillo Record Number: [id number]    YOB: 1950   Date of Consultation: 9/15/2017   Physician requesting consultation: Dr. Chente Lai    Reason for Consultation Illness:  69year old male with multiple myeloma with clinical course as above was seen by BONNIE Sanchez palliative care today for ongoing rib and back pain related to myeloma lesions. He was noted to have productive cough x 3 days. Feeling poorly.   More • Multiple myeloma ([facility] [address]     7/2015 biopsy of rib lesion   • Multiple myeloma ([facility] [address]    • Osteoarthritis     left knee   • Personal history of antineoplastic chemotherapy    • Tubular adenoma of colon 5/11/2015     Past Surgical History:  No date: MICHELLE (COMPAZINE) 10 mg tablet TAKE 1 TABLET(10 MG) BY MOUTH EVERY 6 HOURS AS NEEDED FOR NAUSEA Disp: 385 tablet Rfl: 3   aspirin 81 MG Oral Tab Take 81 mg by mouth daily.  Disp:  Rfl:    Fluticasone Propionate (FLONASE) 50 MCG/ACT Nasal Suspension 1 spray by Philippe Encounters:  09/15/17 : 62.1 kg (137 lb)  09/13/17 : 64.2 kg (141 lb 8 oz)  09/07/17 : 62.1 kg (137 lb)  09/05/17 : 62.3 kg (137 lb 6.4 oz)  08/31/17 : 61.1 kg (134 lb 9.6 oz)  08/29/17 : 63 kg (138 lb 12.8 oz)    ECOG PS: 2     Physical Examination:  Gene the prior study. This may be further evaluated with CT scanning, if indicated. 3. Stable right-sided rib fractures and associated pleural reaction.      Impression & Plan:     *Multiple myeloma  -poor risk cytogenetics based on both t(4;14) and 17p deletio

## 2017-09-15 NOTE — CONSULTS
[facility]  Report of Consultation    Monique Cuba Patient Status:  Inpatient    1950 MRN [id number]   [facility] 4NW-A Attending Sara Beard MD   Hosp Day # 0 PCP Angel Montero MD     Reason for Consultation:  KAMLA    H APPENDECTOMY  8/17/15: BONE MARROW ASPIRATE &BIOPSY  No date: PORT, INDWELLING, IMP      Comment: left  No date: TONSILLECTOMY  Family History   Problem Relation Age of Onset   • Cancer Father       of Colon cancer   • Other Lien Banegas Sister       report lb  09/13/17 1354 : 141 lb 8 oz  09/07/17 0939 : 137 lb  09/05/17 1320 : 137 lb 6.4 oz  08/31/17 1030 : 134 lb 9.6 oz    General: Alert and oriented appears uncomfortable.   HEENT: No scleral icterus, dry MM  Neck: Supple, no SHAVON or thyromegaly  Cardiac: Re juan carlos if needed. Follow closely    #2  MM- per oncology      Thank you for allowing me to participate in the care of your patient. Please do not hesitate to call with any questions or concerns.        Raj Ambrose  9/15/2017  3:53 PM

## 2017-09-15 NOTE — PLAN OF CARE
NURSING ADMISSION NOTE      Patient admitted via Wheelchair  Oriented to room. Safety precautions initiated. Bed in low position. Call light in reach. Patient admitted from cancer center treated for Multiple Myeloma. Patient is in the middle of ra

## 2017-09-15 NOTE — PROGRESS NOTES
Palliative Care Follow up Note    Patient Name: Lizzy Bob   YOB: 1950   Medical Record Number: [id number]   CSN: [id number]   Date of visit: 9/15/2017       Chief Complaint/Reason for Visit:  Weakness      History of Present Illness: Past Medical History:   Diagnosis Date   • Allergic rhinitis    • Anemia associated with chemotherapy 11/12/2015   • Back problem    • Basal cell carcinoma of skin of other and unspecified parts of face 4/3/2012   • Basal cell carcinoma of skin of other tablets (20 mg total) by mouth every 3 (three) hours as needed for Pain., Disp: 180 tablet, Rfl: 0  •  lubiprostone 24 MCG Oral Cap, Take 24 mcg by mouth 2 (two) times daily with meals. , Disp: , Rfl:   •  acyclovir 400 MG Oral Tab, Take 1 tablet (400 mg to Review of Systems:   General:  Fatigue. Feels weak    Neuro:  Complains of bilateral finger paresthesia  Respiratory: SOB, productive cough  Cardiac:  Denies chest pain, heart palpitations  Abdomen:  Denies diarrhea. Denies pain.   Psych:  No complain FNP-BC  [facility] Outpatient Palliative Nurse Practitioner

## 2017-09-15 NOTE — PROGRESS NOTES
Patient to be admitted under hospitalist, ARF  Discussed with Dr. Jiles Duverney [phone], Kobi Multani RN to call report and arrange transfer

## 2017-09-15 NOTE — PROGRESS NOTES
Patient came in to clinic today with SOB and feeling unstable. He was seen by Yudith Aldana NP, Pulse ox 89-91% on Room air. O2 2L per NC started. VSS. Labs drawn and results verified by Dr. Anish Polo. Patient to be admitted to room 431.  Report given to [facility]

## 2017-09-16 NOTE — PROGRESS NOTES
[facility]  Nephrology Progress Note    Tony Tamez Patient Status:  Inpatient    1950 MRN [id number]   [facility] 4NW-A Attending Tony Mejia MD   Hosp Day # 1 PCP Omar Walker MD       SUBJECTIVE:  States he feels b (LEXAPRO) tablet 10 mg 10 mg Oral Daily   fentaNYL (DURAGESIC) 75 MCG/HR 1 patch 1 patch Transdermal Q72H   gabapentin (NEURONTIN) cap 300 mg 300 mg Oral BID   lubiprostone (AMITIZA) cap 24 mcg 24 mcg Oral BID with meals   oxyCODONE HCl (OXY-IR) cap/tab 10

## 2017-09-16 NOTE — H&P
[facility]  History and Physical     Yusra Floyd Patient Status:  Inpatient    1950 MRN [id number]   [facility] 4NW-A Attending Mehreen [facility] Day # 0 PCP Janay Wu MD     Chief Complaint: acute • Hyperlipidemia    • Impaired fasting glucose 5/20/2014   • Multiple myeloma ([address]     7/2015 biopsy of rib lesion   • Multiple myeloma (HCC)    • Osteoarthritis     left knee   • Personal history of antineoplastic chemotherapy     last chemo therapy 2 differently: Take 300 mg by mouth 2 (two) times daily.  ) Disp: 30 capsule Rfl: 3   bisacodyl (DULCOLAX) 10 MG Rectal Suppos Place 10 mg rectally daily as needed. Disp:  Rfl:    simvastatin 20 MG Oral Tab Take 20 mg by mouth nightly.  Disp:  Rfl:    Senna 8 extremities. Extremities: No edema or cyanosis. Integument: No rashes or lesions. Psychiatric: Appropriate mood and affect.       Diagnostic Data:      Labs:  Recent Labs   Lab  09/15/17   1148   WBC  5.9   HGB  9.3*   MCV  86.8   PLT  138.0*       Rece Based on patients current state of illness, I anticipate that, after discharge, patient will require TBD.

## 2017-09-16 NOTE — PLAN OF CARE
PAIN - ADULT    • Verbalizes/displays adequate comfort level or patient's stated pain goal Not Progressing        RISK FOR INFECTION - ADULT    • Absence of fever/infection during anticipated neutropenic period Not Progressing        SAFETY ADULT - FALL

## 2017-09-16 NOTE — CONSULTS
Critical access hospital Pharmacy Note:  Renal Adjustment for Zosyn (piperacillin/tazobactam)    Candice Piper is a 69year old male who has been prescribed Zosyn (piperacillin/tazobactam) 3.375 g every 8 hrs.   CrCl is estimated creatinine clearance is 12.7 mL/min (based on

## 2017-09-16 NOTE — PLAN OF CARE
Assumed care @ 0730. Patient drowsy, easily arousable, appears distracted, oriented to person and place. Patient c/o mild pain on left ribcage.  Patient has shortness of breath with exertion, lungs sound diminished, with occasional productive cough, tan spu

## 2017-09-16 NOTE — PROGRESS NOTES
[facility]  Progress Note     Howard Perry Patient Status:  Inpatient    1950 MRN [id number]   [facility] 4NW-A Attending Adam Mejia MD   Hosp Day # 1 PCP Jackie Santos MD     Chief Complaint: weakness    S: Patient Oral Daily   • Senna  17.2 mg Oral BID   • azithromycin  250 mg Oral Daily   • docusate sodium  100 mg Oral BID       ASSESSMENT / PLAN:     1. KAMLA due to dehydration and nsaids-gfr slowly improving  2. MM  3. Pancytopenia  4. Hyperkalemia  5.  Metabolic ac

## 2017-09-17 NOTE — PROCEDURES
[facility] - ELECTROENCEPHALOGRAM (EEG) REPORT  Patient Name:  Mandy Noel   MRN / CSN:  [id number] / [id number]   Date of Birth / Age:  1/13/1950 /  69year old   Encounter Date:  9/17/17         METHODS:  Twenty-two electrodes were applied according to the 10 correlation is advised.     Report covers  Start 9/17/17 at 1231  End 9/17/17 at [facility]:  [facility] [address] [facility] Neurology

## 2017-09-17 NOTE — PROGRESS NOTES
[facility]  Progress Note     Lawrence Vicky Patient Status:  Inpatient    1950 MRN [id number]   [facility] 4NW-A Attending Reynold Terry MD   Hosp Day # 2 PCP Jackie Santos MD     Chief Complaint: MM    S: Patient Yazmin Hippo • gabapentin  300 mg Oral BID   • lubiprostone  24 mcg Oral BID with meals   • PEG 3350  17 g Oral Daily   • Senna  17.2 mg Oral BID   • azithromycin  250 mg Oral Daily   • docusate sodium  100 mg Oral BID       ASSESSMENT / PLAN:     1. Sepsis  1.  BCX p

## 2017-09-17 NOTE — PROGRESS NOTES
THE [facility] Progress Note      Patient Name: Bola Corral   YOB: 1950  Medical Record Number: [id number]  Attending Physician: Harrison Garcia.  Bowen Pastor M.D.       SUBJECTIVE:  Caroline Esparza is a(n) 69year old male with mult None Seen   Yeast Urine None Seen None Seen   -HAPTOGLOBIN   Collection Time: 09/17/17  6:25 AM   Result Value Ref Range   Haptoglobin 303.0 (H) 30.0 - 200.0 mg/dL   -CBC W/ DIFFERENTIAL   Collection Time: 09/17/17  6:33 AM   Result Value Ref Range   WBC 5 Bilirubin, Total 0.5 0.1 - 2.0 mg/dL   Total Protein 5.6 (L) 6.1 - 8.3 g/dL   Albumin 2.5 (L) 3.5 - 4.8 g/dL   Bilirubin, Direct <0.1 (L) 0.1 - 0.5 mg/dL   -LDH   Collection Time: 09/17/17  6:34 AM   Result Value Ref Range    (H) 84 - 249 U/L   -A Plan  1. Acute renal failure; Ultrasound unremarkable. Nephrology on consult. Patient has a nonsecretory myeloma. This in combination with acute onset would suggest myleoma is not etiology. 2.   Multiple myeloma: No acute therapy.  Systemic therapy as o

## 2017-09-17 NOTE — PLAN OF CARE
HEMATOLOGIC - ADULT    • Maintains hematologic stability Progressing        PAIN - ADULT    • Verbalizes/displays adequate comfort level or patient's stated pain goal Progressing    Denies pain. Slept well overnight.     RESPIRATORY - ADULT    • Achieves op

## 2017-09-17 NOTE — CONSULTS
Neurology H&P    Ralph Pendleton Patient Status:  Inpatient    1950 MRN [id number]   [facility] 4NW-A Attending Cate Kelley MD   Hosp Day # 2 PCP Mo Sahu MD     Subjective:   Ralph Pendleton is a(n) 69year old male with m disease     Acute renal failure (ARF) (HCC)      PMHx:  Past Medical History:   Diagnosis Date   • Allergic rhinitis    • Anemia associated with chemotherapy 11/12/2015   • Back problem    • Basal cell carcinoma of skin of other and unspecified parts of fa cough  CV: no chest pain, no new lower extremity swelling  GI: no constipation or abdominal pain  : denies frequent urination or difficulty urinating   Heme: no new bruising, no unexplained fevers or chills  Endocrine: no unexplained weight loss or gain, GLU 76 09/17/2017   CA 7.6 09/17/2017   ALB 2.5 09/17/2017   ALKPHO 78 09/17/2017   BILT 0.5 09/17/2017   TP 5.6 09/17/2017   AST 42 09/17/2017   ALT 22 09/17/2017   PGLU 103 09/16/2017       Imaging:  No recent CNS imaging    Assessment:   This is a 69 y

## 2017-09-17 NOTE — PLAN OF CARE
Assumed care @ 0730. Patient drowsy, easily arousable, oriented to person, place and time. Patient restless, uncooperative. Hand grasp strong and equal. Patient denies pain. Temp 99.0 max.  Patient has shortness of breath with exertion, lungs sound diminish

## 2017-09-17 NOTE — PROGRESS NOTES
[facility]  Nephrology Progress Note    Minda Harada Patient Status:  Inpatient    1950 MRN [id number]   [facility] 4NW-A Attending Sahil Wilder MD   Hosp Day # 2 PCP Klaudia Boss MD       SUBJECTIVE:  States he feels b 09/16/17   1135   PGLU  79  103*       Meds:     Current Facility-Administered Medications:  Heparin Sodium (Porcine) 5000 UNIT/ML injection 5,000 Units 5,000 Units Subcutaneous Q8H [facility]   Piperacillin Sod-Tazobactam So (ZOSYN) 3.375 g in dextrose 5 % 100 m were discussed with patient and/or family by bedside.         Louise Henning MD  9/17/2017  6:14 PM

## 2017-09-18 NOTE — PHYSICAL THERAPY NOTE
PHYSICAL THERAPY EVALUATION - INPATIENT     Room Number: 431/431-A  Evaluation Date: 9/18/2017  Type of Evaluation: Initial  Physician Order: PT Eval and Treat    Presenting Problem: Multiple myeloma w/ relapse; acute renal failure  Reason for Therapy: parts of face 4/3/2012    Basal Cell Carcinoma Face     • Cancer ([facility] [address]     multiple myeloma   • Cataract     cataract surgery 2 1/2 weeks ago L eye   • Depression    • Exposure to radiation     radiation on shoulders & side in the past; supposed to start t ASSESSMENT  Upper extremity ROM is within functional limits; strength was not formally tested    Lower extremity ROM is within functional limits     Lower extremity strength was not formally tested    BALANCE  Static Sitting: Good  Dynamic Sitting: Good  S (impulsive, varying speeds of chichi)          Skilled Therapy Provided: Pt received semi-supine in bed w/ family present. Semi-supine to sit mod I w/ use of bedrails. Sit to stand independently. Amb 250ft supervision w/o device.   Performed Modified Be Based on this evaluation, patient's clinical presentation is evolving and overall the evaluation complexity is considered moderate.   These impairments and comorbidities manifest themselves as functional limitations in independent bed mobility, transfers,

## 2017-09-18 NOTE — PROGRESS NOTES
[facility]  Nephrology Progress Note    Cj Ram Patient Status:  Inpatient    1950 MRN [id number]   [facility] 4NW-A Attending Fito Fulton MD   Hosp Day # 3 PCP Elijah Richardson MD       SUBJECTIVE:  No acute events 308*       Recent Labs   Lab  09/16/17   0740  09/16/17   1135  09/18/17   0718   PGLU  79  103*  89       Meds:     Current Facility-Administered Medications:  Heparin Sodium (Porcine) 5000 UNIT/ML injection 5,000 Units 5,000 Units Subcutaneous [facility]     #2  MM- per oncology    Questions/concerns were discussed with patient and/or family by bedside.     Jaime Finch MD  9/18/2017  11:03 AM

## 2017-09-18 NOTE — PHYSICAL THERAPY NOTE
Order received, chart reviewed. Attempted this AM.  Pt leaving for video swallow. Will re-attempt as able.

## 2017-09-18 NOTE — VIDEO SWALLOW STUDY NOTE
ADULT VIDEOFLUOROSCOPIC SWALLOWING STUDY    Admission Date: 9/15/2017  Evaluation Date: 09/18/17  Radiologist: Lisa Gibson    Dear Dr. Elmo Blanca,  This letter is to inform you of Ashli Anderson Videofluoroscopic Swallowing Study results and/or plan of treatment left knee   • Personal history of antineoplastic chemotherapy     last chemo therapy 2 weeks ago   • Renal disorder     came with elevated creatinine,ARF   • Shortness of breath     came in with SOB from Cancer center   • Tubular adenoma of colon 5/11/ Yes    Penetration Aspiration Scale Score: Score 1: Material does not enter airway    Overall Impression: Pt's swallow presents WFL at this time. Recommend regular diet and thin liquids. No further dysphagia therapy is indicated at this time.     EDUCATION/

## 2017-09-18 NOTE — SLP NOTE
ADULT SWALLOWING EVALUATION    ASSESSMENT & PLAN   ASSESSMENT  Pt seen at bedside this AM for swallow evaluation. Pt admitted to worsening SOB and confusion. RN approved session.  Speech consulted due to recent pneumonia diagnosis with concern for aspiratio Basal cell carcinoma of skin of other and unspecified parts of face 4/3/2012   • Basal cell carcinoma of skin of other parts of face 4/3/2012    Basal Cell Carcinoma Face     • [facility] ([facility]     multiple myeloma   • Cataract     cataract surgery 2 1/2 weeks Swallow: Impaired  Laryngeal Elevation Timing: Impaired  Laryngeal Elevation Strength: Intact  Laryngeal Elevation Coordination: Intact  (Please note: Silent aspiration cannot be evaluated clinically.  Videofluoroscopic Swallow Study is required to rule-out

## 2017-09-18 NOTE — PROGRESS NOTES
[facility]  Progress Note     Hakeem  Patient Status:  Inpatient    1950 MRN [id number]   [facility] 4NW-A Attending Royal Jonathan MD   Hosp Day # 3 PCP Dionicio Christine MD     Chief Complaint: MM    S: Patient feeli Epic.    Medications:   • Heparin Sodium (Porcine)  5,000 Units Subcutaneous Q8H [address]   • piperacillin-tazobactam  3.375 g Intravenous Q12H   • dronabinol  2.5 mg Oral BID AC   • escitalopram  10 mg Oral Daily   • fentaNYL  1 patch Transdermal Q72H   • haydee stability  At this point Mr. Caprice Garcia is expected to be discharge to: home    Plan of care discussed with patient, son Codi Escalante, and RN. Lucero Aguilar, RN, MSN/APN student  BONNIE Wilson     Addendum:    Agree with above note. Pt. Seen and examined.

## 2017-09-18 NOTE — PROGRESS NOTES
[id number] [address] [facility] Neurology Progress Note    Yusra Barrientos Patient Status:  Inpatient    1950 MRN [id number]   [facility] 4NW-A Attending Janeth Haney MD   Hosp Day # 3 PCP Janay Wu MD     Chief Complaint:   Follow up Testing:  EEG 9/17/2017  IMPRESSION:  This EEG is an abnormal study recorded in the awake and drowsy states showing background slowing into the theta range (7Hz) and rare triphasic waves over the bilateral central, parietal and temporal areas.  This is cons to person, place, time - gives age, month, day, date, year and hospital name and place  Speech fluent and conversational  Repetition, naming, and fund of knowledge intact  Comprehension: able to follow simple, complex and crossed commands    Memory: 2 out hemorrhage or extra-axial fluid collection.   Dictated by: Tiffany Tejeda MD on 9/17/2017 at 11:49     Approved by: Tiffany Tejeda MD            Xr Video Swallow ([id number])    Result Date: 9/18/2017  PROCEDURE:  SWALLOWING STUDY  TECHNIQUE:  Video

## 2017-09-18 NOTE — PLAN OF CARE
GASTROINTESTINAL - ADULT    • Maintains or returns to baseline bowel function Progressing        HEMATOLOGIC - ADULT    • Maintains hematologic stability Progressing        Impaired Functional Mobility    • Achieve highest/safest level of mobility/gait Pro

## 2017-09-18 NOTE — CM/SW NOTE
Responding to order for SW for PMD.    Met and spoke briefly with pt and daughter at pt's bedside. Pt advises he's been asked to choose a doctor as a PCP.  Currently pt has been following with Dr. Osmany Figueroa from [place], but pt wants to consider Regina CAICEDO

## 2017-09-18 NOTE — PROGRESS NOTES
Hematology/Oncology Progress Note    Patient Name: Jina Carreno  Medical Record Number: [id number]    YOB: 1950     Reason for Consultation:  Jina Carreno was seen today for the diagnosis of multiple myeloma, KAMLA    Interval events:  Fe 09/17/17   0633   WBC  3.8*  5.8   HGB  7.7*  7.8*   HCT  24.2*  24.5*   PLT  123.0*  114.0*   MCV  88.0  88.8   RDW  19.2*  19.4*   NEPRELIM  2.89  4.83       Recent Labs   Lab  09/16/17   0658  09/17/17   0634  09/18/17   0600   NA  143  144  147*   K  5 could be due to dexamethasone when given 20mg PO twice weekly, as no other cause found.   Has tolerated lower dose of dexamethasone 10mg when given IV weekly   -avoid high dose steroids if able       *anemia, thrombocytopenia- due to myeloma, prior chemo, l

## 2017-09-19 NOTE — PROGRESS NOTES
Hematology/Oncology Progress Note    Patient Name: Mandy Noel  Medical Record Number: [id number]    YOB: 1950     Reason for Consultation:  Mandy Noel was seen today for the diagnosis of multiple myeloma, KAMLA    Interval events:  Fe petechiae      Laboratory:  Recent Labs   Lab  09/17/17   0633  09/19/17   0600   WBC  5.8  6.6   HGB  7.8*  6.9*   HCT  24.5*  21.5*   PLT  114.0*  138.0*   MCV  88.8  87.8   RDW  19.4*  19.1*   NEPRELIM  4.83  5.60       Recent Labs   Lab  09/17/17   063 time to right ribs and T10  -continue fentanyl patch 75mcg/hr, oxycodone 10mg PO prn, would give IV dilaudid for more severe breakthrough prn.   Continue gabapentin and marinol     *constipation with RLQ pain  -improved since started [place]- continue this

## 2017-09-19 NOTE — CONSULTS
[facility]                       Gastroenterology [address] [facility] Gastroenterology    Howard Perry Patient Status:  Inpatient    1950 MRN [id number]   [facility] 4NW-A Attending MD Ian Esposito colonoscopy took place 5-6 yrs ago (unable to recall findings or where it took place) and has never completed an EGD. Currently, the patient is to receive 1 PRBC with daily IV iron.       PMHx:   Past Medical History:   Diagnosis Date   • Allergic rhinitis Q72H   gabapentin (NEURONTIN) cap 300 mg 300 mg Oral BID   lubiprostone (AMITIZA) cap 24 mcg 24 mcg Oral BID with meals   oxyCODONE HCl (OXY-IR) cap/tab 10-20 mg 10-20 mg Oral Q3H PRN   PEG 3350 (MIRALAX) powder packet 17 g 17 g Oral Daily   Prochlorperazi chronic arthritis, myalgias, arthralgias            Genitourinary: + admitted with KAMLA           Psychiatric: + depression           Oncologic: + multiple myeloma (dx 7/2015)           ENT: The patient reports no hoarseness of voice, hearing loss, sinus co MCV  87.8   MCH  28.2   MCHC  32.1   RDW  19.1*   NEPRELIM  5.60   WBC  6.6   PLT  138.0*       Recent Labs   Lab  09/17/17   0634  09/19/17   0600   ALT  22  26   AST  42*  34       Imaging:     PROCEDURE:  SWALLOWING STUDY     TECHNIQUE:  Video fluoros the mastoid air cells are unremarkable. Visualized portions of the orbits are unremarkable. Bony exostosis off the vertex of the parietal bones is noted.   IMPRESSION:  No significant changes since prior exam. Sequelae of chronic small vessel ischemic dise injury. History of Multiple Myeloma with chemo/radiation therapy. Renal labs are abnormal.  BUN 49 Creatnine 5.33 both elevated.         FINDINGS:      RIGHT KIDNEY  MEASUREMENTS:  12 x 4.4 x 6.2 cm  ECHOGENICITY:  Normal.  HYDRONEPHROSIS:  None.   CYSTS/ST significant interval change identified. 2. There are 2 nonspecific nodular foci projecting into the lingular region of the left lung which are unchanged from the prior study. This may be further evaluated with CT scanning, if indicated.   3. Stable right-s requiring Oxygen. He is on Zosyn for PNA. Will start a small dose of prep now and give the full prep again tomorrow given chronic constipation.      The potentially life-threatening complications of infection, sedation, bleeding, and perforation were review

## 2017-09-19 NOTE — CM/SW NOTE
09/19/17 1600   CM/SW Referral Data   Referral Source Social Work (self-referral)   Reason for Referral Discharge planning   Informant Patient   Patient Info   Patient's Mental Status Alert;Oriented   Patient's [address]   Number of Levels

## 2017-09-19 NOTE — PROGRESS NOTES
[facility]  Progress Note     Elver Garrett Patient Status:  Inpatient    1950 MRN [id number]   [facility] 4NW-A Attending Alberto Shankar MD   Hosp Day # 4 PCP Leah Tovar MD     Chief Complaint: MM    S: Patient   [place] 2.5 mg Oral BID AC   • escitalopram  10 mg Oral Daily   • fentaNYL  1 patch Transdermal Q72H   • gabapentin  300 mg Oral BID   • lubiprostone  24 mcg Oral BID with meals   • PEG 3350  17 g Oral Daily   • Senna  17.2 mg Oral BID   • docusate sodium  100 mg to chronic constipation  -cont.  alexus May MD

## 2017-09-19 NOTE — PHYSICAL THERAPY NOTE
PHYSICAL THERAPY TREATMENT NOTE - INPATIENT    Room Number: 431/431-A     Session: 1   Number of Visits to Meet Established Goals: 4    Presenting Problem: Multiple myeloma w/ relapse; acute renal failure    History related to current admission: Pt is 69 Carcinoma Face     • Cancer ([facility] [address]     multiple myeloma   • Cataract     cataract surgery 2 1/2 weeks ago L eye   • Depression    • Exposure to radiation     radiation on shoulders & side in the past; supposed to start this week but did not    • Family hist sitting on the side of the bed?: None   How much help from another person does the patient currently need. ..   -   Moving to and from a bed to a chair (including a wheelchair)?: A Little   -   Need to walk in hospital room?: A Little   -   Climbing 3-5 tamara Discharge Recommendations: Home with home health PT     PLAN  PT Treatment Plan: Body mechanics; Endurance; Energy conservation;Patient education; Family education;Gait training;Strengthening;Balance training;Stair training  Rehab Potential : Fair  Frequency

## 2017-09-19 NOTE — PROGRESS NOTES
[facility]  Nephrology Progress Note    Carlin Cervantes Patient Status:  Inpatient    1950 MRN [id number]   [facility] 4NW-A Attending Nalini Gasca MD   Hosp Day # 4 PCP Lyndsey Mojica MD       SUBJECTIVE:  No acute events, 1753   [facility]  103*  89  115*  140*       Meds:     Current Facility-Administered Medications:  iron sucrose (VENOFER) IV Push 200 mg 200 mg Intravenous Daily   0.9%  NaCl infusion  Intravenous Once   ipratropium-albuterol (DUONEB) nebulizer solution 3 mL 3 bedside.     Sav Matos MD  9/19/2017  10:54 AM

## 2017-09-19 NOTE — PHYSICAL THERAPY NOTE
Attempted to see pt this AM.  Pt was busy w/ physician for extended period of time. Will re-attempt as able.

## 2017-09-19 NOTE — PROGRESS NOTES
Pt seen and examined. Passed swallow. Feels well today. Stool OB +Ve and hgb low this morning. Had C-scope at 48 and 60. GI to see. Likely EGD/c-scope in AM to r/o GI etiology of Fe def anemia. Some dyspnea and mild hypoxia. Repeat CXR.  May need to [facility]

## 2017-09-20 NOTE — HOME CARE LIAISON
TNL SPOKE  WITH PTNT WIFE JEOVNAY TO DISCUSS HOME HEALTH SERVICES AND COVERAGE CRITERIA. WIFE AGREEABLE TO Lloyd Cazares. PTNT GIVEN RESIDENTIAL BROCHURE. RESIDENTIAL WITH PROVIDE SN/PT ON DISCHARGE.     Thank you for this referral,   Jessica Langley

## 2017-09-20 NOTE — OCCUPATIONAL THERAPY NOTE
OCCUPATIONAL THERAPY QUICK EVALUATION - INPATIENT    Room Number: 431/431-A  Evaluation Date: 9/20/2017     Type of Evaluation: Initial and Quick Eval  Presenting Problem: SOB, multiple myeloma    Physician Order: IP Consult to Occupational Therapy  Reason rhinitis    • Anemia associated with chemotherapy 11/12/2015   • Back problem    • Basal cell carcinoma of skin of other and unspecified parts of face 4/3/2012   • Basal cell carcinoma of skin of other parts of face 4/3/2012    Basal Cell Carcinoma Face states \"I guess my memory hasn't been great\"    Patient self-stated goal is to walk around without the wires    OBJECTIVE  Precautions: Bed/chair alarm  Fall Risk: High fall risk    WEIGHT BEARING RESTRICTION  Weight Bearing Restriction: None session/findings; Alarm set; Discussed recommendations with /    ASSESSMENT     Patient is a 69year old male admitted on 9/15/2017 for multiple myeloma. Complete medical history and occupational profile noted above.  Functional outco

## 2017-09-20 NOTE — PROGRESS NOTES
Assumed care at 1900, 1 unit of blood transfused without reaction. Patient is forgetful, can be impulsive. Fall precaution. Afebrile, denies  Pain, urinating.  Remain on 2L of oxygen and saturation is above 93%,  Bed alarm on at all times, need reinforceme

## 2017-09-20 NOTE — PROGRESS NOTES
Gastroenterology Progress Note  Patient Name: Eleni Sadlana  Chief Complaint: MATTY, occult +  S: Pt denies BRBPR, melena, abdominal pain. He drank 2 L of Golytely yesterday.    O: /54 (BP Location: Left arm)   Pulse 82   Temp 98.3 °F (36.8 °C) (Oral lumbar spine, non-traumatic ([facility] [address]     Anemia due to chemotherapy     Spinal compression fracture, with routine healing, subsequent encounter     Cancer associated pain     Constipation due to opioid therapy     Leukopenia     Gastroesophageal reflux diseas

## 2017-09-20 NOTE — PLAN OF CARE
Assumed care for this patient at 0730: patient alert and oriented. Forgetful at times. Complains of mild pain to left rib. Oxy prn for pain. Poor oral intake. Patient with chronic constipation. Plan for colonoscopy and EGD in am. IV zosyn for PNA.  Vitals s

## 2017-09-20 NOTE — PROGRESS NOTES
[facility]  Nephrology Progress Note    Hakeem  Patient Status:  Inpatient    1950 MRN [id number]   [facility] 4NW-A Attending Aftab Euceda MD   Hosp Day # 5 PCP Dionicio Christine MD       SUBJECTIVE:  No acute events, 4,000 mL Oral Once   iron sucrose (VENOFER) IV Push 200 mg 200 mg Intravenous Daily   ipratropium-albuterol (DUONEB) nebulizer solution 3 mL 3 mL Nebulization PRN   Labetalol HCl (TRANDATE) injection 10 mg 10 mg Intravenous Q4H PRN   AmLODIPine Besylate (N MD  9/20/2017  9:24 AM

## 2017-09-20 NOTE — PROGRESS NOTES
Hematology/Oncology Progress Note    Patient Name: Malou Silvestre  Medical Record Number: [id number]    YOB: 1950     Reason for Consultation:  Malou Silvestre was seen today for the diagnosis of multiple myeloma, KAMLA    Interval events:  Fe bilaterally  GI/Abd: Soft, normoactive bowel sounds, no hepatosplenomegaly  Skin: no rashes or petechiae      Laboratory:  Recent Labs   Lab  09/19/17   0600   WBC  6.6   HGB  6.9*   HCT  21.5*   PLT  138.0*   MCV  87.8   RDW  19.1*   NEPRELIM  5.60 75mcg/hr, oxycodone 10mg PO or IV dilaudid for more severe breakthrough prn.   Continue gabapentin and marinol     *constipation with RLQ pain  -improved since started amitiza- continue this        *anemia, thrombocytopenia- due to myeloma, prior chemo, wor

## 2017-09-20 NOTE — PROGRESS NOTES
[facility]  Progress Note     Kvng Dodge Patient Status:  Inpatient    1950 MRN [id number]   [facility] 4NW-A Attending Yoshi Man MD   Hosp Day # 5 PCP Katheryn Marion MD     Chief Complaint: MM    S: Patient   Reji Mazariegos • dronabinol  2.5 mg Oral BID AC   • escitalopram  10 mg Oral Daily   • fentaNYL  1 patch Transdermal Q72H   • gabapentin  300 mg Oral BID   • lubiprostone  24 mcg Oral BID with meals   • PEG 3350  17 g Oral Daily   • Senna  17.2 mg Oral BID   • docusate home      p fritz of care discussed with patient, and RN. Will call BONNIE Leavitt     Addendum:    Agree with above note. Pt. Seen and examined. Gen: NAD  CVS: s1s2  Resp: CTA  Abd: soft    -as above  -cont.  ABX  -Cr improving    Vicente Akb

## 2017-09-20 NOTE — PHYSICAL THERAPY NOTE
PHYSICAL THERAPY TREATMENT NOTE - INPATIENT    Room Number: 431/431-A     Session: 2   Number of Visits to Meet Established Goals: 4    Presenting Problem: Multiple myeloma w/ relapse; acute renal failure    History related to current admission: Pt is 67 Carcinoma Face     • Cancer ([facility] [address]     multiple myeloma   • Cataract     cataract surgery 2 1/2 weeks ago L eye   • Depression    • Exposure to radiation     radiation on shoulders & side in the past; supposed to start this week but did not    • Family hist the bed?: None   How much help from another person does the patient currently need. ..   -   Moving to and from a bed to a chair (including a wheelchair)?: None   -   Need to walk in hospital room?: A Little   -   Climbing 3-5 steps with a railing?: A Thao supervision to ensure safe mobility. Patient continues to demonstrate deficits in strength, balance, endurance, mobility and functional independence, and will benefit from continued skilled PT during patient's IP stay.     Continue to recommend home with h

## 2017-09-20 NOTE — PROGRESS NOTES
Patient is alert,impulsive. Bed alarm in use. Assisted with urinal.IV antibiotics given per MD orders. [email or web address] in use. Colon prep to restart this AM.Will endorse to next shift.

## 2017-09-21 NOTE — PLAN OF CARE
GASTROINTESTINAL - ADULT    • Maintains or returns to baseline bowel function Not Progressing        MUSCULOSKELETAL - ADULT    • Return mobility to safest level of function Not Progressing    Up unsteady gait. Up by  Himself.  Occasional uses the call mary

## 2017-09-21 NOTE — PROGRESS NOTES
Gastroenterology Progress Note  Patient Name: Chester Carmona  Chief Complaint: MATTY, occult +  S: Pt denies BRBPR, melena, abdominal pain. He could not complete the prep yesterday and was not clear per nursing.    O: /69 (BP Location: Left arm)   Pul non-traumatic ([facility] [address]     Anemia due to chemotherapy     Spinal compression fracture, with routine healing, subsequent encounter     Cancer associated pain     Constipation due to opioid therapy     Leukopenia     Gastroesophageal reflux disease     Multiple

## 2017-09-21 NOTE — PROGRESS NOTES
[facility]  Nephrology Progress Note    Melvin Wills Patient Status:  Inpatient    1950 MRN [id number]   [facility] 4NW-A Attending Di Magdaleno MD   Hosp Day # 6 PCP Gualberto Chambers MD       SUBJECTIVE:  C/o ongoing const 09/18/17   1753   [facility]  [facility]  140*       Meds:     Current Facility-Administered Medications:  potassium chloride IVPB premix 40 mEq 40 mEq Intravenous Once   magnesium sulfate IVPB premix 4 g 4 g Intravenous Once   dextrose 5% infusion  Intravenous Contin poor po intake in the setting of NSAID use. Creat has been improving and should cont to normalize     #2  MM- per oncology    #3. Hypernatremia- due to poor intake. Stable on d5w.   Encourage po intake      Questions/concerns were discussed with patient an

## 2017-09-21 NOTE — PROGRESS NOTES
Hematology/Oncology Progress Note    Patient Name: Fabiola Yanes  Medical Record Number: [id number]    YOB: 1950     Reason for Consultation:  Fabiola Yanes was seen today for the diagnosis of multiple myeloma, KAMLA    Interval events:  Fe 09/19/17   0600  09/20/17   0814  09/21/17   0550   WBC  6.6  6.9  8.7   HGB  6.9*  8.5*  8.3*   HCT  21.5*  25.8*  24.2*   PLT  138.0*  182.0  164.0   MCV  87.8  85.1  84.0   RDW  19.1*  18.7*  18.3*   NEPRELIM  5.60  5.57  7.41*       Recent Labs   Lab RT, this time to right ribs and T10  -continue fentanyl patch 75mcg/hr, oxycodone 10mg PO or IV dilaudid for more severe breakthrough prn.   Continue gabapentin     *constipation with RLQ pain  -improved since started amitiza  -ongoing bowel prep currently

## 2017-09-21 NOTE — PHYSICAL THERAPY NOTE
Attempted to see pt this AM for PT services. Per RN, this is the second colonoscopy prep attempt that has been cancelled 2/2 not having clear stools. Pt is agitated and uncooperative this Am. Will attempt later as able.

## 2017-09-21 NOTE — PROGRESS NOTES
[facility]  Progress Note     Chester Carmona Patient Status:  Inpatient    1950 MRN [id number]   [facility] 4NW-A Attending Lalita Loyola MD   Hosp Day # 6 PCP Molly Hutchinson MD     Chief Complaint: MM    S: Patient     R glycol  4,000 mL Oral Once   • iron sucrose  200 mg Intravenous Daily   • AmLODIPine Besylate  5 mg Oral Daily   • piperacillin-tazobactam  3.375 g Intravenous Q12H   • escitalopram  10 mg Oral Daily   • fentaNYL  1 patch Transdermal Q72H   • gabapentin  3 expected to be discharge to: home  p Plan of care discussed with patient, and RN. Wife   Goranzi JoshiBONNIE       Addendum:    Agree with above note. Pt. Seen and examined. Gen: NAD  CVS: s1s2  Resp: CTA  Abd: soft    -cont.  ABX  -wean O2 as able,

## 2017-09-21 NOTE — PLAN OF CARE
Maintains or returns to baseline bowel function Progressing     Attempt to prepare for colonoscopy last night--unable to finish prep, stool not clear. Plan is to reattempt tonight to prep and EGD/colonoscopy tomorrow. Consent signed.      Maintains hemato

## 2017-09-22 NOTE — OPERATIVE REPORT
Operative Report-Colonoscopy with snare polypectomy    Elver Garrett 1/13/1950   [facility] [id number] MRN [id number]   Admission Date 9/15/2017 Operation Date 9/22/2017   Attending Physician Alberto Shankar MD Operating Physician DO ANIRUDH BowdenOPE capsule endoscopy as an outpatient. We will sign off at this time.   - Repeat colonoscopy in 5 years with an extended prep.     CC Report:   Eulalia Joel  9/22/2017  4:27 PM

## 2017-09-22 NOTE — PHYSICAL THERAPY NOTE
PHYSICAL THERAPY TREATMENT NOTE - INPATIENT    Room Number: [facility] ENDO POOL ROOMS/ ENDO Pool     Session: 3/4   Number of Visits to Meet Established Goals: 4    Presenting Problem: Multiple myeloma w/ relapse; acute renal failure     History related to cur other parts of face 4/3/2012    Basal Cell Carcinoma Face     • Cancer ([facility] [address]     multiple myeloma   • Cataract     cataract surgery 2 1/2 weeks ago L eye   • Depression    • Exposure to radiation     radiation on shoulders & side in the past; supposed to s wheelchair, bedside commode, etc.): None   -   Moving from lying on back to sitting on the side of the bed?: None   How much help from another person does the patient currently need. ..   -   Moving to and from a bed to a chair (including a wheelchair)?: No clearing areas of bedrooms to have straight path to the bathroom for night time use. PT also discussed with pt having more awareness of overall stability to incr safety at home.  Pt will cont to benefit from skilled IP PT services in order to maximize funct

## 2017-09-22 NOTE — ANESTHESIA POSTPROCEDURE EVALUATION
[address] [place] [address]  Patient Status:  Inpatient   Age/Gender 69year old male MRN [id number]   Location [facility] Attending Iris Gaitan MD   [facility] Day # 7 PCP Devon Yeh MD       Anesthesia Post-op Note    Procedure(s

## 2017-09-22 NOTE — TELEPHONE ENCOUNTER
Patient is in the hospital and is very sick, he has pneumonia, not eating and extreme weakness. He is having a colonoscopy and a throat scope today to make sure everything is going ok. He also has cancer.   Dr. Luis E Almeida has never seen this patient but he ha

## 2017-09-22 NOTE — PROGRESS NOTES
Hematology/Oncology Progress Note    Patient Name: Fabiola Yanes  Medical Record Number: [id number]    YOB: 1950     Reason for Consultation:  Fabiola Yanes was seen today for the diagnosis of multiple myeloma, KAMLA    Interval events: 0600   WBC  6.9  8.7  8.2   HGB  8.5*  8.3*  8.5*   HCT  25.8*  24.2*  25.4*   PLT  182.0  164.0  167.0   MCV  85.1  84.0  84.9   RDW  18.7*  18.3*  18.2*   NEPRELIM  5.57  7.41*  6.65       Recent Labs   Lab  09/20/17   0813  09/21/17   0550  09/22/17   0 anemia due to iron deficiency anemia- concern for GI bleeding  -ret-hgb supports start of iron deficiency. -s/p 1 unit prbcs 9/19, CBC stable today.  Transfuse for hgb <7  -continue IV venofer 200mg daily while here up to 5 doses  -GI following, will perf

## 2017-09-22 NOTE — PROGRESS NOTES
[facility] Pharmacy Note:  Renal Dose Adjustment for Metoclopramide (REGLAN)    Candelario Bell has been prescribed Metoclopramide (REGLAN) 10 mg IV as needed x 8 hours for nausea. Estimated Creatinine Clearance: 27.6 mL/min (based on SCr of 2.29 mg/dL (H)).

## 2017-09-22 NOTE — TELEPHONE ENCOUNTER
Stairs at home. Wife concerned with his current state and strength. Wants to make sure they are safe   Ihave advised they get a PT session specifically to do the things he would have to do at home and ensure he can do them safely without assistance.   Oth

## 2017-09-22 NOTE — ANESTHESIA PREPROCEDURE EVALUATION
PRE-OP EVALUATION    Patient Name: Chester Carmona    Pre-op Diagnosis: Iron deficiency anemia, unspecified iron deficiency anemia type [D50.9]    Procedure(s):  ESOPHAGOGASTRODUODENOSCOPY, COLONOSCOPY  COLONOSCOPY    Surgeon(s) and Role:     * Chata Diop Oral BID   Zolpidem Tartrate (AMBIEN) tab 10 mg 10 mg Oral Nightly PRN   HYDROmorphone HCl PF (DILAUDID) 1 MG/ML injection 0.5-1 mg 0.5-1 mg Intravenous Q2H PRN   [COMPLETED] azithromycin (ZITHROMAX) tab 500 mg 500 mg Oral Daily   Followed by      RECOVERY [facility] Maleate (COMPAZINE) 10 mg tablet TAKE 1 TABLET(10 MG) BY MOUTH EVERY 6 HOURS AS NEEDED FOR NAUSEA Disp: 385 tablet Rfl: 3   aspirin 81 MG Oral Tab Take 81 mg by mouth daily.  Disp:  Rfl:    Fluticasone Propionate (FLONASE) 50 MCG/ACT Nasal Suspension 1 spra TONSILLECTOMY     Smoking status: Current Every Day Smoker  1.00 Packs/day  For 40.00 Years     Types: Cigarettes    Smokeless tobacco: Never Used    Comment: recent / stress related    Alcohol use Yes    Comment: on occasion       Drug use: No     Availab

## 2017-09-22 NOTE — OPERATIVE REPORT
Operative Report-Esophagogastroduodenoscopy with cold biopsies  Melvin Augustineluc 1/13/1950   [facility] [id number] MRN [id number]   Admission Date 9/15/2017 Operation Date 9/22/2017   Attending Physician Idalia Becker MD Operating Physician Milton Shea DO

## 2017-09-22 NOTE — PROGRESS NOTES
[facility] Pharmacy Note:  Renal Adjustment for Zosyn (piperacillin/tazobactam)    Arthur Winslow is a 69year old male who has been prescribed Zosyn (piperacillin/tazobactam) 3.375 gm every 12 hrs.   CrCl is estimated creatinine clearance is 27.6 mL/min (based o

## 2017-09-22 NOTE — PROGRESS NOTES
[facility]  Progress Note     Mandy Noel Patient Status:  Inpatient    1950 MRN [id number]   [facility] 4NW-A Attending Briseyda Felipe MD   Hosp Day # 7 PCP Michael Navarro MD     Chief Complaint: MM    S: Patient potassium chloride  40 mEq Intravenous Once   • iron sucrose  200 mg Intravenous Daily   • AmLODIPine Besylate  5 mg Oral Daily   • piperacillin-tazobactam  3.375 g Intravenous Q12H   • escitalopram  10 mg Oral Daily   • fentaNYL  1 patch Transdermal Q72H care discussed with patient, and RN. Wife   Bao BONNIE Stauffer     Addendum:    Agree with above note. Pt. Seen and examined. Gen: NAD  CVS: s1s2  Resp: mild basilar rhonchi  Abd: soft    -doing well  -cont.  ABX  -Cr improving  -scopes today    Fa

## 2017-09-22 NOTE — PROGRESS NOTES
[facility]  Nephrology Progress Note    Chester Carmona Patient Status:  Inpatient    1950 MRN [id number]   [facility] 4NW-A Attending Chris Crowell MD   Hosp Day # 7 PCP Molly Hutchinson MD       SUBJECTIVE:  No acute events, 0500   PGLU  97       Meds:     Current Facility-Administered Medications:  potassium chloride IVPB premix 40 mEq 40 mEq Intravenous Once   dextrose 5% infusion  Intravenous Continuous   iron sucrose (VENOFER) IV Push 200 mg 200 mg Intravenous Daily   ipra improves     #2  MM- per oncology    #3. Hypernatremia- due to poor intake. Stable on d5w. Encourage po intake    #4. Hypokalemia- replace      Questions/concerns were discussed with patient and/or family by bedside.       Sho Chung MD  9/22/20

## 2017-09-22 NOTE — PLAN OF CARE
GASTROINTESTINAL - ADULT    • Maintains or returns to baseline bowel function Progressing        METABOLIC/FLUID AND ELECTROLYTES - ADULT    • Electrolytes maintained within normal limits Progressing        PAIN - ADULT    • Verbalizes/displays adequate co

## 2017-09-22 NOTE — PLAN OF CARE
Patient resting in bed. Affect pleasant and oriented x4. Patient requesting to not have a bed alarm on. Is not confused or disoriented. IV antibioitics finished at 10:15. Potassium and Mag hung for replacement. PT to evaluate today.  Patient and wife update

## 2017-09-23 NOTE — CM/SW NOTE
Referral started to [facility] for home oxygen. Referral pending. sw to follow for dc planning.  lorenzo notified residential c of potential dc home today

## 2017-09-23 NOTE — CM/SW NOTE
Referral accepted by [facility] for delivery of home equipment today. RT to bring portable tank to pts hospital room for DC today. RN updated.

## 2017-09-23 NOTE — PROGRESS NOTES
Hematology/Oncology Progress Note    Patient Name: Monique Cuba  Medical Record Number: [id number]    YOB: 1950     Reason for Consultation:  Monique Cuba was seen today for the diagnosis of multiple myeloma, KAMLA    Interval events: HCT  24.2*  25.4*   PLT  164.0  167.0   MCV  84.0  84.9   RDW  18.3*  18.2*   NEPRELIM  7.41*  6.65       Recent Labs   Lab  09/21/17   0550  09/22/17   0600  09/23/17   0650   NA  145*  146*  143   K  3.1*  3.0*  3.0*  2.9*   CL  111  110  109   CO2  24 Await biopsies. -ret-hgb supports start of iron deficiency. -s/p 1 unit prbcs 9/19, CBC stable today.  Transfuse for hgb <7  -continue IV venofer 200mg daily while here up to 5 doses      *PNA  -on zosyn, completed course of azithromycin    May d/c home

## 2017-09-23 NOTE — PROGRESS NOTES
NURSING DISCHARGE NOTE    Discharged Home via Wheelchair. Accompanied by Family member  Belongings Taken by patient/family. Patient discharged on home O2 2 l/nc.

## 2017-09-23 NOTE — PROGRESS NOTES
[facility]  Nephrology Progress Note    Jolly Ramachandran Patient Status:  Inpatient    1950 MRN [id number]   [facility] 4NW-A Attending Woody Hernández MD   Hosp Day # 8 PCP Heidi Lim MD       SUBJECTIVE:  No acute issues o (BP Location: Left arm)   Pulse 73   Temp 98 °F (36.7 °C) (Oral)   Resp 18   Wt 141 lb 8 oz   SpO2 98%   BMI 22.74 kg/m²   Temp (24hrs), Av.1 °F (36.7 °C), Min:97.2 °F (36.2 °C), Max:98.9 °F (37.2 °C)       Intake/Output Summary (Last 24 hours) at  Aiden Brush  9/23/2017

## 2017-09-23 NOTE — PROGRESS NOTES
Oe sat on room air @ rest 87%, O2 2l/nc applied, O2sat 94% on O2 2l/nc. O2sat 87% on room air with ambulation, O2 3l/nc appplied, O2 sat 92% on O2 3l/nc with ambulation.

## 2017-09-23 NOTE — CM/SW NOTE
SW notified pt will need home oxygen.  Will need an order by the physician that includes the following information within the order:    Home O2 concentrator and portable tanks at ____ liters NC to be used _____ (frequency)  Diagnosis: _____   Duration of tr

## 2017-09-23 NOTE — PROGRESS NOTES
Patient seen and examined. Medically clear to discharge today. Complete ABX PO. Need O2 at home. Add atrovent inhaler on d/c PRN given on going tobacco abuse/ nicotine dependence. Discussed with him and he is agreeable to home O2. Starting palliative RT.

## 2017-09-23 NOTE — PLAN OF CARE
Assumed care@ 0730. Patient c/o mild pain on right ribcage. Patient has shortness of breath with exertion, patient on O2 2l/nc , O2sat 94%. Patient's vital signs stable.

## 2017-09-25 NOTE — DISCHARGE SUMMARY
[facility] HOSPITALIST  DISCHARGE SUMMARY     Kvng Dodge Patient Status:  Inpatient    1950 MRN [id number]   [facility] 4NW-A Attending No att. providers found   [facility] Day # 8 PCP Katheryn Marion MD     Date of Admission: 9/15/2017 tingling in extremities or any focal weakness. Patient denies any hematochezia, melena, hematuria, hemoptysis, hematemesis. Brief Synopsis:     The patient was diagnosed with sepsis secondary to presumptive aspiration pneumonia.   He had acute kidney in MCG/ACT Aers  Commonly known as:  ATROVENT HFA      Inhale 2 puffs into the lungs 4 (four) times daily as needed for Wheezing.    Quantity:  1 Inhaler  Refills:  0     Pantoprazole Sodium 40 MG Tbec  Commonly known as:  PROTONIX      Take 1 tablet (40 mg to OxyCODONE HCl IR 10 MG Tabs  Commonly known as:  ROXICODONE      Take 2 tablets (20 mg total) by mouth every 3 (three) hours as needed for Pain.    Quantity:  180 tablet  Refills:  0     PEG 3350 Pack  Commonly known as:  MIRALAX      Take 17 g by mouth d distress.    Respiratory: Clear to auscultation bilaterally  Cardiovascular: S1, S2  Abdomen: Soft, nontender, nondistended      -----------------------------------------------------------------------------------------------  PATIENT DISCHARGE INSTRUCTIONS:

## 2017-09-27 PROBLEM — R00.1 BRADYCARDIA: Status: ACTIVE | Noted: 2017-01-01

## 2017-09-27 NOTE — H&P
[facility]  History and Physical     Halies Pass Patient Status:  Inpatient    1950 MRN [id number]   [facility] 4NW-A Attending Oscar Corbin MD;[facility] [address] Day # 0 PCP Edda Diaz MD     Chief Complaint: Sherri Ramsey disorder     came with elevated creatinine,ARF   • Shortness of breath     came in with SOB from Cancer center   • Tubular adenoma of colon 5/11/2015      Past Surgical History: Past Surgical History:  No date: ADENOIDECTOMY  No date: APPENDECTOMY  8/17/15 Rfl: 0   OxyCODONE HCl IR 10 MG Oral Tab Take 2 tablets (20 mg total) by mouth every 3 (three) hours as needed for Pain. Disp: 180 tablet Rfl: 0   lubiprostone 24 MCG Oral Cap Take 24 mcg by mouth 2 (two) times daily with meals.  Disp:  Rfl:    Zolpidem Tar bilaterally. No rhonchi. Cardiovascular: S1, S2. Regular rhythm. Regular rate. No murmurs, rubs or gallops. Equal pulses. Chest and Back: right rib tenderness, lumbar anjel-vertebral tenderness. Abdomen: Soft, nontender, nondistended.   Positive bowel s

## 2017-09-27 NOTE — CONSULTS
Hematology/Oncology Initial Consultation Note    Patient Name: Monique Cuba  Medical Record Number: [id number]    YOB: 1950   Date of Consultation: 9/27/2017   Physician requesting consultation: Dr. Osullivan Petoskey    Reason for Consultation Present Illness:  69year old male with multiple myeloma with clinical course as above is admitted today from clinic for intractable lower back and right anterior rib pain, dehydration, KAMLA, severe fatigue, and confusion.   He was hospitalized last week for came with elevated creatinine,ARF   • Shortness of breath     came in with SOB from Cancer center   • Tubular adenoma of colon 5/11/2015     Past Surgical History:  No date: ADENOIDECTOMY  No date: APPENDECTOMY  8/17/15: BONE MARROW ASPIRATE &BIOPSY  9/ [] ondansetron HCl (ZOFRAN) injection 4 mg 4 mg Intravenous PRN Jose Angel Murillo MD     [] Metoclopramide HCl (REGLAN) injection 5 mg 5 mg Intravenous PRN Jose Angel Murillo MD     [COMPLETED] potassium chloride IVPB premix 40 m mL Intravenous Once Rosaland Rashad, APN Stopped at 09/05/17 1605    [COMPLETED] HYDROmorphone HCl PF (DILAUDID) 1 MG/ML injection 2 mg 2 mg Intravenous Once Rosaland Rashad, APN 2 mg at 09/05/17 1600    [COMPLETED] Zoledronic Acid (ZOMETA) 4 mg in sodium chloride 0 75 MCG/HR Transdermal Patch 72 Hr Place 1 patch onto the skin every third day. Disp: 10 patch Rfl: 0   PEG 3350 Oral Powd Pack Take 17 g by mouth daily. Disp:  Rfl:    lubiprostone 24 MCG Oral Cap Take 24 mcg by mouth 2 (two) times daily with meals.  Disp: tobacco: Never Used    Comment: recent / stress related    Alcohol use Yes    Comment: on occasion    Drug use: No    Sexual activity: Not on file     Other Topics Concern    Exercise Yes    Comment: up to last couple of weeks     Social History Narrative 22*  21*   CREATSERUM  1.80*  1.86*   GLU  92  124*   CA  8.9  8.9   TP   --   6.5   ALB   --   2.8*   ALKPHO   --   83   AST   --   17   ALT   --   15*   BILT   --   0.3       No results for input(s): PT, INR, PTT in the last 72 hours.     CT brain 9/17/1 75mcg/hr  -stop oxycodone  -will switch to dilaudid prn for breakthrough going forward, will give only IV dilaudid doses for now tonight while inpatient.   Will add in PO dilaudid option tomorrow.   -Continue gabapentin     *constipation with RLQ pain  -imp

## 2017-09-27 NOTE — PLAN OF CARE
PATIENT ADMITTED TO ROOM 403 PER CART, CALL LIGHT IN REACH, ORIENTED TO ROOM AND SURROUNDINGS. PATIENT ADMITTED WITH MENTAL STATUS CHANGE, AND UNCONTROLLABLE PAIN.  RATES PAIN AT A LEVEL 10, CALL INTO HOSPITALIST

## 2017-09-27 NOTE — PLAN OF CARE
NEUROLOGICAL - ADULT    • Achieves stable or improved neurological status Progressing    • Achieves maximal functionality and self care Progressing          NEUROLOGICAL - ADULT    • Achieves stable or improved neurological status Progressing    • Achieves

## 2017-09-27 NOTE — PATIENT INSTRUCTIONS
For Dr. Inna Villanueva nurse line, call [phone] with any questions or concerns Monday through Friday 8:00 to 4:30. After hours or weekends for emergent needs [phone].

## 2017-09-27 NOTE — PROGRESS NOTES
Hematology/Oncology Clinic Follow Up Note    Interval events:  Presents for follow up. He is feeling poorly today, had trouble even getting out of bed. Recent hospitalization last week.   Pt seen in clinic today then admitted to the hospital due to dehydr

## 2017-09-28 NOTE — PROGRESS NOTES
Hematology/Oncology Progress Note    Patient Name: Colten Mandujano  Medical Record Number: [id number]    YOB: 1950     Reason for Consultation:  Colten Mandujano was seen today for the diagnosis of multiple myeloma, KAMLA, FTT, pain    Interval oz)     Physical Examination:  General: Patient is fatigued, withdrawn, but communicates, oriented to situation  Psych: affect withdrawn and depressed.     CV: Regular rate and rhythm, no murmurs, no LE edema  Respiratory: Lungs clear to auscultation bilate due to cancer/rib fractures  -starting more palliative RT, this time to right ribs and T10 as outpatient- tentatively plan to start on Monday  -continue fentanyl patch 75mcg/hr  -stopped oxycodone.   Will switch to dilaudid prn for breakthrough going forwar

## 2017-09-28 NOTE — OCCUPATIONAL THERAPY NOTE
OCCUPATIONAL THERAPY EVALUATION - INPATIENT     Room Number: 403/403-A  Evaluation Date: 9/28/2017  Type of Evaluation: Initial  Presenting Problem: KAMLA, AMS, uncontrolled pain, myeloma, and acute renal failure    Physician Order: IP Consult to [facility] Date   • Allergic rhinitis    • Anemia associated with chemotherapy 11/12/2015   • Back problem    • Basal cell carcinoma of skin of other and unspecified parts of face 4/3/2012   • Basal cell carcinoma of skin of other parts of face 4/3/2012    Basal Cell he does feel that his UE are weaker than usual.    SUBJECTIVE   \"I can't walk today, I will try tomorrow\"    Patient self-stated goal is to return home    OBJECTIVE     Fall Risk: High fall risk    WEIGHT BEARING RESTRICTION  Weight Bearing Restriction: Therapy Provided: Pt was found in bed in a semi-supine position and agreeable to therapy but declined ambulation secondary to uncontrolled R rib pain at rest. Pt alert and oriented x4. Pt performed supine>sit EOB with supervision assistance.  Pt performed s deficits this patient may benefit from home health occupational therapy services to assess home environment safety and equipment needs, energy conservation strategies, pain management techniques, and ADL/IADL performance in the home environment.     Patient

## 2017-09-28 NOTE — PLAN OF CARE
NEUROLOGICAL - ADULT    • Achieves stable or improved neurological status Progressing    • Achieves maximal functionality and self care Progressing        Patient is alert and oriented x4. Sat 93-95% on 1 liters nasal cannula.  Complaints of right side rib

## 2017-09-28 NOTE — DIETARY MALNUTRITION NOTE
NUTRITION INITIAL ASSESSMENT     Pt meets severe malnutrition criteria.     NUTRITION DIAGNOSIS/PROBLEM:    Malnutrition in the setting of chronic illness related to lethargy, depression, CA as evidenced by 19% wt loss in the past month (per pt report), dec clavicle     2.  Fluid Accumulation: none noted    NUTRITION PRESCRIPTION: using 65 kg IBW  Calories: 7141-9982 calories/day (30-35 calories per kg)  Protein:  grams protein/day (1.5-1.8 grams protein per kg)  Fluid: ~1 ml/kcal or per MD discretion

## 2017-09-28 NOTE — PROGRESS NOTES
Palliative Care Follow up Note    Patient Name: Minda Harada   YOB: 1950   Medical Record Number: [id number]   CSN: [id number]   Date of visit: 9/28/2017       Chief Complaint/Reason for Visit:  Palliative care follow up      History of Pr renal failure (ARF) (HCC)     Altered mental status     Acute aspiration pneumonia (HCC)     Sepsis ([facility] [address]     Toxic metabolic encephalopathy     Iron deficiency anemia due to chronic blood loss     Hypernatremia     Bradycardia     Multiple myeloma not hav on file     Other Topics Concern    Exercise Yes    Comment: up to last couple of weeks     Social History Narrative    He is not working, on medical leave of absence, 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

## 2017-09-28 NOTE — CM/SW NOTE
09/28/17 1100   CM/SW Referral Data   Referral Source Social Work (self-referral)   Reason for Referral Discharge planning   Informant Patient   Readmission Assessment   Pt. received education on diagnoses at time of discharge?  Yes   Did you know who an

## 2017-09-28 NOTE — PLAN OF CARE
Achieve highest/safest level of mobility/gait Progressing     Pt walking standby assist to bathroom. Steady gait. Using call light appropriately when needing assistance. Achieves stable or improved neurological status Progressing     Pt AOx4.      Verbal

## 2017-09-28 NOTE — PLAN OF CARE
Patient rated pain at a level 6, vitals stable while rounding with night shift, telemetry sinus rhythm rate 70's

## 2017-09-28 NOTE — PHYSICAL THERAPY NOTE
PHYSICAL THERAPY EVALUATION - INPATIENT     Room Number: 403/403-A  Evaluation Date: 9/28/2017  Type of Evaluation: Initial  Physician Order: PT Eval and Treat    Presenting Problem: acute kidney injury, AMS  Reason for Therapy: Mobility Dysfunction an (gastroesophageal reflux disease)    • History of acute pancreatitis    • Hyperlipidemia    • Impaired fasting glucose 5/20/2014   • Multiple myeloma ([facility] [address]     7/2015 biopsy of rib lesion   • Multiple myeloma (HCC)    • Osteoarthritis     left knee   • Per within functional limits     Lower extremity strength is grossly 4/5 B    BALANCE  Static Sitting: Normal  Dynamic Sitting: Good  Static Standing: Normal  Dynamic Standing: Good    ADDITIONAL TESTS                                    NEUROLOGICAL FINDINGS training  Posture  Transfer training    Patient End of Session: Up in chair;Needs met;Call light within reach;RN aware of session/findings; All patient questions and concerns addressed; Discussed recommendations with /    ASSESSMENT assistance level: independent     Goal #3 Patient is able to ambulate 300 feet with assist device: walker - rolling at assistance level: independent     Goal #4 Patient is able to negotiate 1 flight of stairs at assistance level: independent   Goal #5 Asse

## 2017-09-28 NOTE — PROGRESS NOTES
[facility] HOSPITALIST  Progress Note     Yusra Barrientos Patient Status:  Inpatient    1950 MRN [id number]   [facility] 4NW-A Attending Janie Ramos MD   Hosp Day # 1 PCP Janay Wu MD     Chief Complaint: pain  S:  Pain start Oral BID   • Umeclidinium Bromide  1 puff Inhalation Daily   • Pantoprazole Sodium  40 mg Oral QAM AC   • PEG 3350  17 g Oral Daily   • Senna  17.2 mg Oral BID   • atorvastatin  10 mg Oral Nightly   • enoxaparin  40 mg Subcutaneous Daily   • escitalopram

## 2017-09-29 NOTE — CONSULTS
[address] [place] [address] Rd  [id number]  Hospital Day # 2  Date of Consult:  9/29/2017    Reason for Consultation:  Consult requested by Dr. Aspen Jim for evaluation of palliative care needs and pain control.     History of Present Illn Zeb Arambula DO;  Location:  ENDOSCOPY  No date: PORT, INDWELLING, IMP      Comment: left  No date: TONSILLECTOMY  Social History: Allergies:  No Known Allergies    Medications:  Complete list reviewed.   Active palliative care medi

## 2017-09-29 NOTE — PROGRESS NOTES
[facility] HOSPITALIST  Progress Note     Petar Sunshine Patient Status:  Inpatient    1950 MRN [id number]   [facility] 4NW-A Attending Kaley Syed MD   Hosp Day # 2 PCP John Maria MD     Chief Complaint: pain  S:  Pain about • fentaNYL  1 patch Transdermal Q72H   • Fluticasone Propionate  1 spray Nasal Daily   • gabapentin  300 mg Oral BID   • Umeclidinium Bromide  1 puff Inhalation Daily   • Pantoprazole Sodium  40 mg Oral QAM AC   • PEG 3350  17 g Oral Daily   • Senna  17.

## 2017-09-29 NOTE — CM/SW NOTE
SW followed up w/pt regarding HH. Pt is still refusing at this time. Pt denies any other needs. Pt is being followed by outpatient palliative care.

## 2017-09-29 NOTE — PROGRESS NOTES
ARTURO has received home oxygen upon discharge from [facility] 9-. He was hospitalized for 8 days. ARTURO completed patients Certificate of Medical necessity for oxygen today and faxed back to [address] [place] [address] at [phone].  Becka

## 2017-09-29 NOTE — PROGRESS NOTES
Hematology/Oncology Progress Note    Patient Name: Colten Mandujano  Medical Record Number: [id number]    YOB: 1950     Reason for Consultation:  Colten Mandujano was seen today for the diagnosis of multiple myeloma, KAMLA, FTT, pain    Interval today    CV: Regular rate and rhythm, no murmurs, no LE edema  Respiratory: Lungs clear to auscultation bilaterally  GI/Abd: Soft, normoactive bowel sounds, no hepatosplenomegaly.   Skin: no rashes or petechiae  MSK: right anterior rib tenderness- pt is gua fractures  -starting more palliative RT, this time to right ribs and T10 as outpatient- tentatively plan to start on Monday  -continue fentanyl patch 75mcg/hr  -stopped oxycodone as was not very effective.   Have switched to dilaudid prn for breakthrough  -

## 2017-09-30 NOTE — PHYSICAL THERAPY NOTE
PHYSICAL THERAPY TREATMENT NOTE - INPATIENT    Room Number: 403/403-A     Session: 1   Number of Visits to Meet Established Goals: 5    Presenting Problem: acute kidney injury, AMS     History related to current admission: Pt is 69year old male admitted fasting glucose 5/20/2014   • Multiple myeloma ([facility] [address]     7/2015 biopsy of rib lesion   • Multiple myeloma (HCC)    • Osteoarthritis     left knee   • Personal history of antineoplastic chemotherapy     last chemo therapy 2 weeks ago   • Renal disorder [place]       AM-PAC Score:  Raw Score: 23   PT Approx Degree of Impairment Score: 11.2%   Standardized Score (AM-PAC Scale): 56.93   CMS Modifier (G-Code): CI    FUNCTIONAL ABILITY STATUS  Gait Assessment   Gait Assistance: Supervision  Distance (ft): 300 Goal #4 Patient is able to negotiate 1 flight of stairs at assistance level: independent  MET   Goal #5 Assess gait next session  MET   Goal #6 Assess stair negotiation next session   Goal Comments: Goals established on 9/28/2017    CURRENT GOALS

## 2017-09-30 NOTE — PLAN OF CARE
Impaired Functional Mobility    • Achieve highest/safest level of mobility/gait Progressing        PAIN - ADULT    • Verbalizes/displays adequate comfort level or patient's stated pain goal Progressing          Pt alert and oriented this shift, ambulating

## 2017-09-30 NOTE — PROGRESS NOTES
Hematology/Oncology Progress Note    Patient Name: Monique Cuba  Medical Record Number: [id number]    YOB: 1950     Reason for Consultation:  Monique Cuba was seen today for the diagnosis of multiple myeloma, KAMLA, FTT, pain    Interval sounds, no hepatosplenomegaly.   Skin: no rashes or petechiae  MSK: right anterior rib tenderness- pt is guarding    Laboratory:  Recent Labs   Lab  09/28/17   0551  09/29/17   0548  09/30/17   0459   WBC  5.8  5.7  6.2   HGB  8.9*  9.1*  9.5*   HCT  27.6* right ribs and T10 as outpatient- tentatively plan to start on Monday  -continue fentanyl patch 75mcg/hr  -stopped oxycodone as was not very effective.   Have switched to dilaudid prn for breakthrough  -pain worse now, but not taking med frequently  -give d

## 2017-09-30 NOTE — PLAN OF CARE
Patient alert and oriented x4. Patient complaining of abdomen pain this morning and given PO dilaudid. Patient states he had a bowel movement this morning. Patient with non-productive cough. Patient to discharge home when pain is better managed.  Patient's

## 2017-09-30 NOTE — PROGRESS NOTES
[facility]PITAL[facility]  Progress Note     Johanna Florez Patient Status:  Inpatient    1950 MRN [id number]   [facility] 4NW-A Attending Kwasi Rodriguez MD   Hosp Day # 3 PCP Louise Amaro MD     Chief Complaint: Pain  S:  Pain about Pantoprazole Sodium  40 mg Oral QAM AC   • PEG 3350  17 g Oral Daily   • Senna  17.2 mg Oral BID   • atorvastatin  10 mg Oral Nightly   • enoxaparin  40 mg Subcutaneous Daily   • escitalopram  20 mg Oral Daily   • lubiprostone  24 mcg Oral BID with meals

## 2017-10-01 NOTE — PROGRESS NOTES
Patient alert and oriented x4. Patient with pain 6/10 to rib area this morning. Patient seen by Dr. Temo Galeana and damien for discharge.  Hospitalist notified for discharge as soon as possible per patient request.

## 2017-10-01 NOTE — PLAN OF CARE
Impaired Activities of Daily Living    • Achieve highest/safest level of independence in self care Progressing        NEUROLOGICAL - ADULT    • Achieves stable or improved neurological status Progressing    • Achieves maximal functionality and self care Pr

## 2017-10-01 NOTE — DISCHARGE SUMMARY
[facility] HOSPITALIST  DISCHARGE SUMMARY     Howard Perry Patient Status:  Inpatient    1950 MRN [id number]   [facility] 4NW-A Attending No att. providers found   [facility] Day # 4 PCP Jackie Santos MD     Date of Admission: 2017 findings and recommendations (brief descriptions):  • None    Lab/Test results pending at Discharge:   · None    Consultants:  • Dr. Radha Hensley    Discharge Medication List:     Discharge Medications      START taking these medications      Instructions Prescr Caps  Commonly known as:  AMITIZA      Take 24 mcg by mouth 2 (two) times daily with meals.    Refills:  0     Pantoprazole Sodium 40 MG Tbec  Commonly known as:  PROTONIX      Take 1 tablet (40 mg total) by mouth every morning before breakfast.   Stop taki distress. Respiratory: Clear to auscultation bilaterally. No wheezes. No rhonchi. Cardiovascular: S1, S2. Regular rate and rhythm. No murmurs, rubs or gallops. Abdomen: Soft, nontender, nondistended. Positive bowel sounds. No rebound or guarding.   Ne

## 2017-10-01 NOTE — PROGRESS NOTES
NURSING DISCHARGE NOTE    Discharged Home via Wheelchair. Accompanied by Spouse  Belongings Taken by patient/family. Patient for discharge home today. Discharge instructions reviewed with patient and wife at bedside.  New prescription's given to p

## 2017-10-01 NOTE — PROGRESS NOTES
Hematology/Oncology Progress Note    Patient Name: Fabiola Yanes  Medical Record Number: [id number]    YOB: 1950     Reason for Consultation:  Fabiola Yanes was seen today for the diagnosis of multiple myeloma, KAMLA, FTT, pain    Interval bilaterally  GI/Abd: Soft, normoactive bowel sounds, no hepatosplenomegaly.   Skin: no rashes or petechiae  MSK: right anterior rib tenderness    Laboratory:  Recent Labs   Lab  09/29/17   0548  09/30/17   0459  10/01/17   0600   WBC  5.7  6.2  5.7   HGB  9 Discharge with PO dilaudid 4mg q3hrs prn  -Continue gabapentin     *constipation   -continue amitiza     *depression, lethargy  -increased lexapro to 20mg daily 9/27- sent new Rx  -added ritalin 5mg BID until the lexapro kicks in- given Rx for this    *ane

## 2017-10-02 NOTE — PROGRESS NOTES
Education Record    Learner:  Patient    Disease / Diagnosis: Multiple Myeloma; Pain    Barriers / Limitations:  None   Comments:    Method:  Brief focused and Reinforcement   Comments:    General Topics:  Pain, Plan of care reviewed and Fall risk and prev

## 2017-10-02 NOTE — PROGRESS NOTES
[facility] Radiation Treatment Management Note 1-5    Patient:  Laura Torres  Age:  69year old  Visit Diagnosis:    1.  Multiple myeloma not having achieved remission ([facility] [address]      Primary Rad/Onc:  Dr. Eva Quach    Site Delivered

## 2017-10-03 NOTE — PROGRESS NOTES
VSS, Patient got his 2mg of IV Dilaudid. Patient went to RT in good condition. Patient escorted via wheelchair with wife. Next appt as follows per MD, GERRY on 10/5 and 10/12. CLL and MD on either 10/18 or 10/19. Patient to return to get his IV Zometa.  Labs

## 2017-10-04 PROBLEM — R53.83 FATIGUE DUE TO TREATMENT: Status: ACTIVE | Noted: 2017-01-01

## 2017-10-04 PROBLEM — R52 PAIN: Status: ACTIVE | Noted: 2017-01-01

## 2017-10-04 PROBLEM — R63.0 LACK OF APPETITE: Status: ACTIVE | Noted: 2017-01-01

## 2017-10-04 NOTE — PATIENT INSTRUCTIONS
- CALL [phone] FOR A FOLLOW-UP WITH DR. Malcolm Clayton ON October 18, 2017 AT 10:30.  - CALL [phone] IF YOU HAVE ANY QUESTIONS/CONCERNS REGARDING RADIATION THERAPY.    SIDE EFFECTS OF RADIATION THERAPY WILL BE

## 2017-10-04 NOTE — PROGRESS NOTES
Palliative Care Follow up Note    Patient Name: Monique Cuba   YOB: 1950   Medical Record Number: [id number]   CSN: [id number]   Date of visit: 10/4/2017       Chief Complaint/Reason for Visit:  Palliative follow up     History of Present ([facility] [address]     Toxic metabolic encephalopathy     Iron deficiency anemia due to chronic blood loss     Hypernatremia     Bradycardia     [facility]     Cancer related pain     Delirium     Pain     Lack of appetite Exercise Yes    Comment: up to last couple of weeks     Social History Narrative    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 , Disp: 60 tablet, Rfl: 0  •  Fluticasone Propionate (FLONASE) 50 MCG/ACT Nasal Suspension, 1 spray by Nasal route daily. , Disp: 1 Bottle, Rfl: prn  •  Ascorbic Acid (VITAMIN C) 1000 MG Continue amitiza  2. Senna 2 tabs BID  3. Dulcolax suppository prn    Lack of appetite  1. Ensure  2. Small frequent meals    Fatigue  1. Limit daytime sleep  2. Reduce dilaudid use    Rhinitis  1.   flonase      Planned Follow up:   2 weeks    30 to

## 2017-10-04 NOTE — PROGRESS NOTES
Education Record    Learner:  Patient and Spouse    Disease / Juan F Curio control prior to radiation    Barriers / Limitations:  None   Comments:    Method:  Discussion   Comments:    General Topics:  Medication and Plan of care reviewed   Comments:nathaniel

## 2017-10-05 NOTE — TELEPHONE ENCOUNTER
Per [facility] Palliative APN, we need to faxed an order to [facility] to cancel home O2. Patient no longer needs. Faxed to [phone].

## 2017-10-06 PROBLEM — R52 PAIN: Status: RESOLVED | Noted: 2017-01-01 | Resolved: 2017-01-01

## 2017-10-06 PROBLEM — N17.9 ACUTE RENAL FAILURE (ARF) (HCC): Status: RESOLVED | Noted: 2017-01-01 | Resolved: 2017-01-01

## 2017-10-06 PROBLEM — R63.0 LACK OF APPETITE: Status: RESOLVED | Noted: 2017-01-01 | Resolved: 2017-01-01

## 2017-10-06 PROBLEM — D64.9 NORMOCYTIC ANEMIA: Status: RESOLVED | Noted: 2017-01-01 | Resolved: 2017-01-01

## 2017-10-06 PROBLEM — R00.1 BRADYCARDIA: Status: RESOLVED | Noted: 2017-01-01 | Resolved: 2017-01-01

## 2017-10-06 NOTE — PROGRESS NOTES
Patient presents with:  Hospital F/U: Pneumonia       HPI:    Sharif Myers is a 69year old male here today for hospital follow up.     Discharge Date: 10/1/17  Hospital Discharge Diagnosis: diarrhea, KAMLA  TCM Diagnosis:  Other: See Below; still recommen no appetite. Health Status:  Stable    Follow up of pending results and treatments from hospital:  None. Functional Status:  Patient able to care for self at home. Pain Control:  Pain is not limiting.     Fall/Risk Assessment     Do you have 3 or Zolpidem Tartrate 10 MG Oral Tab Take 1 tablet (10 mg total) by mouth nightly as needed for Sleep. Disp: 30 tablet Rfl: 3   gabapentin 300 MG Oral Cap Take 1 capsule (300 mg total) by mouth nightly.  (Patient taking differently: Take 300 mg by mouth 2 (tw adenoma of colon 5/11/2015      Past Surgical History:  No date: ADENOIDECTOMY  No date: APPENDECTOMY  8/17/15: BONE MARROW ASPIRATE &BIOPSY  9/22/2017: COLONOSCOPY N/A      Comment: Procedure: COLONOSCOPY;  Surgeon: Court Villanueva DO;  L auscultation  CARDIO: RRR without murmur  GI: minimal BS. Non tender.   : deferred  RECTAL: deferred  MUSCULOSKELETAL: back is not tender, FROM of the back  EXTREMITIES: no cyanosis, clubbing or edema  NEURO: Oriented times three, cranial nerves are [place] within 2 business days of discharge. 2. Complexity of Medical Decision Making is Moderate. Patient seen within 14 days.

## 2017-10-09 NOTE — PROGRESS NOTES
Dilaudid 1 mg IVP given to pt as ordered. Pt states he would like to skip tomorrow's appointment and see if he can go without pain medication. Will cancel tomorrow's appointment only and pt will cancel the rest if needed.      Education Record    Learner:

## 2017-10-09 NOTE — PROGRESS NOTES
[facility] Radiation Treatment Management Note 6-10    Patient:  Melvin Wills  Age:  69year old  Visit Diagnosis:    1.  Multiple myeloma not having achieved remission ([facility] [address]      Primary Rad/Onc:  Dr. Ambrose Martinez

## 2017-10-11 NOTE — PROGRESS NOTES
Palliative Care Follow up Note    Patient Name: Fabiola Yanes   YOB: 1950   Medical Record Number: [id number]   CSN: [id number]   Date of visit: 10/11/2017       Chief Complaint/Reason for Visit:  Palliative care follow up     History of Pr 11/12/2015   • Back problem    • Basal cell carcinoma of skin of other and unspecified parts of face 4/3/2012   • Basal cell carcinoma of skin of other parts of face 4/3/2012    Basal Cell Carcinoma Face     • Cancer (HCC)     multiple myeloma   • Cataract 1 tablet (5 mg total) by mouth 2 (two) times daily with meals. , Disp: 60 tablet, Rfl: 0  •  escitalopram 20 MG Oral Tab, Take 1 tablet (20 mg total) by mouth daily. , Disp: 30 tablet, Rfl: 6  •  POTASSIUM CHLORIDE ER 20 MEQ Oral Tab CR, TAKE TWO TABLETS(40M Cholecalciferol (VITAMIN D) 1000 UNITS Oral Tab, Take 1,000 Units by mouth daily. , Disp: , Rfl:     Review of Systems:   General:  Fatigue. Feels well.     Respiratory:  Denies SOB, denies cough  Cardiac:  Denies chest pain, heart palpitations  Abdomen:  D BONNIE, FNP-BC  [facility] Outpatient Palliative Nurse Practitioner

## 2017-10-16 NOTE — ED PROVIDER NOTES
Patient Seen in: [facility] Emergency Department    History   Patient presents with:  Dehydration (metabolic/constitutional)    Stated Complaint: dehydration    HPI    The patient is a 20-year-old male with a history of multiple myeloma, who was las Comment: Procedure: COLONOSCOPY;  Surgeon: Donny Simons DO;  Location:  ENDOSCOPY  No date: PORT, INDWELLING, IMP      Comment: left  No date: TONSILLECTOMY    Family History   Problem Relation Age of Onset   • Cancer Father       throughout abdomen. No CVA tenderness. Extremities: No deformity, nontender throughout, and normal active range of motion of all 4 extremities. Distal pulses normal and symmetric  Skin: No masses or nodules or abnormalities.   Psych: Normal interaction, c COMPARISON:  [facility] , CT ABDOMEN PELVIS IV CONTRAST, NO ORAL (ER), 6/12/2017, 2:44. [facility] , CT ABDOMEN PELVIS IV CONTRAST, NO ORAL (ER), 7/13/2017, 14:59. INDICATIONS:  dehydration, vomiting, and generalized abdominal discomfort.  History of multiple mye unremarkable. The colon is also within normal limits. No bowel wall thickening or dilatation noted. No free intraperitoneal air, fluid or inflammatory changes. ABDOMINAL WALL:  No mass or hernia.   URINARY BLADDER:  No visible focal wall thickening, lesion I discussed the results with him, and encouraged to drink fluids, and to increase his Ensure intake. They have been offered admission but he states he would prefer to go home.   He was discharged home in stable condition and will follow up with his oncolog

## 2017-10-16 NOTE — ED INITIAL ASSESSMENT (HPI)
Pt had radiation trt x 3 days ago and has not an appetite. Pt is weak and is no longer urinating. Denies n/v/d or fever, but complaining of BRB in brown stool x 2 days ago. Pt denies pain.

## 2017-10-17 NOTE — TELEPHONE ENCOUNTER
Call from Zev Rodriguez stating patient went to Ed yesterday for confusion and \"not feeling good\". He was sent home from ED. They had removed fentanyl patch and patient's mentation is no better today.   Wife is concerned because he is worse today with confusion

## 2017-10-17 NOTE — ED PROVIDER NOTES
Patient Seen in: [facility] Emergency Department    History   Patient presents with:  Altered Mental Status (neurologic)  Body ache and/or chills    Stated Complaint: AMS    HPI    Patient is a 47-year-old with a history of multiple myeloma currentl on shoulders & side in the past; supposed to start this week but did not    • Family history of colon cancer 5/11/2015   • GERD (gastroesophageal reflux disease)    • History of acute pancreatitis    • Hyperlipidemia    • Impaired fasting glucose 5/20/2014 1221]    Current:/78   Pulse 81   Temp 98.2 °F (36.8 °C) (Temporal)   Resp 19   Ht 170.2 cm (5' 7\")   Wt 56.7 kg   SpO2 97%   BMI 19.58 kg/m²         Physical Exam    General: Patient is awake, alert that he does not feel well covered in blankets. or arrhythmia         Chest x-ray: No acute findings  he was given fluids and Dilaudid  ============================================================  ED Course  ------------------------------------------------------------  MDM     69year-old with somewhat

## 2017-10-18 PROBLEM — K64.8 OTHER HEMORRHOIDS: Status: ACTIVE | Noted: 2017-01-01

## 2017-10-18 NOTE — PROGRESS NOTES
Patient here for MD f/u. Was in ER yesterday. Increase fatigue, denies SOB. Continued pain in abd and rectum. Decreased appetite.

## 2017-10-18 NOTE — PROGRESS NOTES
Palliative Care Follow up Note    Patient Name: Candice Piper   YOB: 1950   Medical Record Number: [id number]   CSN: [id number]   Date of visit: 10/18/2017       Chief Complaint/Reason for Visit:  Palliative care follow up     History of Pr Fatigue due to treatment       Medical History:   Past Medical History:   Diagnosis Date   • Allergic rhinitis    • Anemia associated with chemotherapy 11/12/2015   • Back problem    • Basal cell carcinoma of skin of other and unspecified parts of face 4/3 % Rectal Cream, Place 1 Application rectally 2 (two) times daily. , Disp: 1 Tube, Rfl: 1  •  Methylnaltrexone Bromide (RELISTOR) 12 MG/0.6ML Subcutaneous Kit, Inject 12 mg into the skin every other day., Disp: 1 kit, Rfl: 0  •  LACTULOSE 10 GM/15ML Oral Sol Rfl: 0  •  Fluticasone Propionate (FLONASE) 50 MCG/ACT Nasal Suspension, 1 spray by Nasal route daily. , Disp: 1 Bottle, Rfl: prn  •  Ascorbic Acid (VITAMIN C) 1000 MG Oral Tab, Take 1,000 mg by mouth daily. , Disp: , Rfl:   •  Cholecalciferol (VITAMIN D) 10 prn  3.  D/C gabapentin    Constipation  1. D/C amitiza  2. Senna 2 tabs BID  3. Lactulose BID    Hemorrhoids  1.  anusol 2.5% cream BID    Appetite  1. May improve with resolution of constipation  2. Dietician consult    Depression  1.   Resume lexapr

## 2017-10-18 NOTE — PROGRESS NOTES
Hematology/Oncology Clinic Follow Up Note    Patient Name: Agnieszka Murillo Record Number: [id number]    YOB: 1950   PCP: Dr. Dominique Jeffrey  Other providers: BONNIE Cao (palliative care), Dr. Santiago Ledesma (psychologist), Improvement in pain      ==========================================  Interval events:  Presents for follow up. Today he reports he is feeling poorly. Last week had been doing better with less pain and more energy.   Today reports his right rib pains and T APPENDECTOMY  8/17/15: BONE MARROW ASPIRATE &BIOPSY  9/22/2017: COLONOSCOPY N/A      Comment: Procedure: COLONOSCOPY;  Surgeon: Edgardo Mohr DO;  Location: [address] [place] [address] ENDOSCOPY  No date: PORT, INDWELLING, IMP      Comment: left  No date: TONSIL lb)  10/02/17 : 57.8 kg (127 lb 6.4 oz)    ECOG PS: 2    Physical Examination:  General: Patient is alert and oriented, not in acute distress  Psych: Mood and affect are depressed  Eyes: EOMI   ENT: Oropharynx is clear  CV: Regular rate and rhythm, no murm and faint free kappa   3/22/2017 0.08 (H)- Monoclonal IgG kappa and faint free kappa   3/2/2017 0.10 (H)-Monoclonal IgG kappa and faint free kappa   2/9/2017 0.03 (H)- Monoclonal IgG kappa and faint free kappa   1/7/2017 No monoclonal protein on SPEP or IF 8/31/17:FINDINGS:  There is intense avid increased FDG activity in the C3 vertebral body with SUV of 19.49. There is increased uptake within the spinous process and left lamina of C6 with maximal SUV of 7.39 in the spinous process and 3.90 in the lamina.  Etta Read the glenoid of the left scapula with maximal SUV of 3.15. No discrete lesion is appreciated. There is a lytic defect within the anterior cortex of the right humeral head with increased FDG activity and a maximum SUV of 4 .37.    There is increased FDG activ costovertebral junction mass compared to the previous PET CT from 6/09/17. Abdomen pelvis: The noncontrast appearance of the liver, spleen, adrenal glands, pancreas and kidneys appear unremarkable.  There is a fusiform infrarenal aortic aneurysm again not disease.   Started chemo break to get palliative RT to right ribs and T10, completed last week with improvement in these sites of pain.   -did not discuss specific plans for next chemo regimen as he was feeling poorly today, will have him return next week t

## 2017-10-18 NOTE — PATIENT INSTRUCTIONS
For Dr. Ozzy Arredondo nurse line, call [phone] with any questions or concerns,  Monday through Friday 8:00 to 4:30. After hours or weekends for urgent needs: [phone].   Central [phone]  Medical Records:   [phone]  Cancer Cent

## 2017-10-20 NOTE — PROGRESS NOTES
Education Record    Learner:  Patient and Spouse    Disease / Diagnosis:MM/hydration/pain meds    Barriers / Limitations:  None   Comments:    Method:  Discussion   Comments:    General Topics:  Medication, Plan of care reviewed and Fall risk and preventio

## 2017-10-20 NOTE — PROGRESS NOTES
ARTURO met with patient's wife Filomena Queen at the request of Jo Dueñas. Filomena Guerreroorest advises that she's \"been down this road before,\" and she knows what do to. She states that she met with ARTURO because Hannah wanted her to, and now we've met.   She states she has no needs fro

## 2017-10-20 NOTE — PROGRESS NOTES
Palliative Care Follow up Note    Patient Name: Eleni Saldana   YOB: 1950   Medical Record Number: [id number]   CSN: [id number]   Date of visit: 10/20/2017       Chief Complaint/Reason for Visit:  Palliative care follow up     History of Pr Diagnosis Date   • Allergic rhinitis    • Anemia associated with chemotherapy 11/12/2015   • Back problem    • Basal cell carcinoma of skin of other and unspecified parts of face 4/3/2012   • Basal cell carcinoma of skin of other parts of face 4/3/2012 1 Tube, Rfl: 1  •  Methylnaltrexone Bromide (RELISTOR) 12 MG/0.6ML Subcutaneous Kit, Inject 12 mg into the skin every other day., Disp: 1 kit, Rfl: 0  •  LACTULOSE 10 GM/15ML Oral Solution, TAKE 30ML BY MOUTH TWICE DAILY, Disp: 5377 mL, Rfl: 3  •  ondanset spray by Nasal route daily. , Disp: 1 Bottle, Rfl: prn  •  Ascorbic Acid (VITAMIN C) 1000 MG Oral Tab, Take 1,000 mg by mouth daily. , Disp: , Rfl:   •  Cholecalciferol (VITAMIN D) 1000 UNITS Oral Tab, Take 1,000 Units by mouth daily. , Disp: , Rfl:     Revie 5mg       Planned Follow up:   1 week    30 total minutes spent with patient >50% of visit was spent in counseling and coordination of care for symptom management.         Electronically Signed by:  BONNIE Castellon, TRAVONP-CECILE Huynh Outpatient Palliative Nurse

## 2017-10-25 PROBLEM — G89.3 NEOPLASM RELATED PAIN: Status: ACTIVE | Noted: 2017-01-01

## 2017-10-25 NOTE — PATIENT INSTRUCTIONS
For Dr. Marilin Moreno nurse line, call [phone] with any questions or concerns,  Monday through Friday 8:00 to 4:30. After hours or weekends for urgent needs: [phone].   Central [phone]  Medical Records:   [phone]  Cancer Cent

## 2017-10-25 NOTE — PROGRESS NOTES
Palliative Care Follow up Note    Patient Name: Monique Cuba   YOB: 1950   Medical Record Number: [id number]   CSN: [id number]   Date of visit: 10/25/2017       Chief Complaint/Reason for Visit:  Severe pain     History of Present Illness: skin of other and unspecified parts of face 4/3/2012   • Basal cell carcinoma of skin of other parts of face 4/3/2012    Basal Cell Carcinoma Face     • Cancer ([facility] [address]     multiple myeloma   • Cataract     cataract surgery 2 1/2 weeks ago L eye   • Depressio tablet, Rfl: 3  •  hydrocortisone 2.5 % Rectal Cream, Place 1 Application rectally 2 (two) times daily. , Disp: 1 Tube, Rfl: 1  •  Methylnaltrexone Bromide (RELISTOR) 12 MG/0.6ML Subcutaneous Kit, Inject 12 mg into the skin every other day., Disp: 1 kit, Rf Systems:   General:  Fatigue. Pain. Respiratory:  Denies SOB, denies cough  Cardiac:  Denies chest pain, heart palpitations  Abdomen:  Constipation, denies diarrhea. Denies pain. MSK;  Shoulder pain and limited mobility  Psych:  No complaints.   Slee

## 2017-10-25 NOTE — PROGRESS NOTES
Hematology/Oncology Clinic Follow Up Note    Patient Name: Agnieszka Murillo Record Number: [id number]    YOB: 1950   PCP: Dr. Jer Mariscal  Other providers: BONNIE Vieira (palliative care), Dr. Latosha Cobb (psychologist), Improvement in pain      ==========================================  Interval events:  Presents for follow up. Has developed worsening bilateral shoulder pains since last visit. In severe pain today. Current opiate regimen has not been adequate.   Relis Bromide (RELISTOR) 12 MG/0.6ML Subcutaneous Kit Inject 12 mg into the skin every other day.  Disp: 1 kit Rfl: 0   LACTULOSE 10 GM/15ML Oral Solution TAKE 30ML BY MOUTH TWICE DAILY Disp: 5377 mL Rfl: 3   HYDROmorphone HCl 4 MG Oral Tab Take 1 tablet (4 mg to Allergies    Psychosocial History:    Social History  Social History   Marital status:   Spouse name: N/A    Years of education: 12  Number of children: 2     Occupational History     Works For [employer]      Social History Main Topics   Smoking petechiae    Laboratory:    Lab Results  Component Value Date   WBC 6.1 10/25/2017   WBC 3.8 (L) 10/18/2017   WBC 3.6 (L) 10/17/2017   HGB 10.0 (L) 10/25/2017   HGB 9.6 (L) 10/18/2017   HGB 8.7 (L) 10/17/2017   HCT 30.5 (L) 10/25/2017   MCV 88.9 10/25/2017 (H)     Component KAPPA FREE LIGHT CHAIN LAMBDA FREE LIGHT CHAIN   Latest Ref Rng & Units 0.330 - 1.940 mg/dL 0.571 - 2.630 mg/dL   8/28/2017 153.500 (H) <0.060 (L)   7/31/2017 110.546 (H) <0.060 (L)   7/10/2017 59.334 (H) <0.060 (L)   6/26/2017 37.326 (H) activity ranges between SUV is 5.58 and 20.28. There is a pathologic fracture of the anterior right fourth rib lesion and lateral right sixth rib lesion.  There is a large lytic soft tissue mass/defect within the seventh lateral right rib lesion measuring 3 associated   with the destructive lesion measuring 3.1 x 2.5 cm. In maximum SUV is 8.63. There is an expansile mixed lytic and sclerotic lesion within the posterior left acetabulum also with increased FDG activity with a maximum SUV of 8.51.  Abnormal incre of the expansile lesion of the right lateral seventh rib and at the costovertebral junction at T10. There is also slight increase in the degree of expansile lytic lesion of the left iliac bone.  There has also been interval increase in size of the expansile care    *constipation with abd pain  -likely precipitating hemorrhoids and rectal pain  -to start relistor.   -following palliative care    *hemorrhoids  -anusol cream  -control constipation    *poor appetite  -dietitian consult  -stopped marinol recently

## 2017-10-25 NOTE — PROGRESS NOTES
Education Record    Learner:  Patient and Spouse    Disease / Diagnosis:multiple myeloma/pain  Barriers / Limitations:  None   Comments:    Method:  Discussion   Comments:    General Topics:  Medication, Side effects and symptom management, Plan of care re

## 2017-10-27 NOTE — PROGRESS NOTES
Pt arrived for IV fluids nad pain medicine, pt was added on by BONNIE Young for pain control, pt slightly confused do to home pain medication and fatigue, pt only able to walk a couple of steps and needs wheel chair to aid in mobility,   Education Record    [facility]

## 2017-10-27 NOTE — PROGRESS NOTES
Palliative Care Follow up Note    Patient Name: Ivania Mensah   YOB: 1950   Medical Record Number: [id number]   CSN: [id number]   Date of visit: 10/27/2017       Chief Complaint/Reason for Visit:  Palliative care follow up     History of Pr History:   Diagnosis Date   • Allergic rhinitis    • Anemia associated with chemotherapy 11/12/2015   • Back problem    • Basal cell carcinoma of skin of other and unspecified parts of face 4/3/2012   • Basal cell carcinoma of skin of other parts of face 4 needed for Sleep., Disp: 30 tablet, Rfl: 3  •  FentaNYL (SUBSYS) 100 MCG Sublingual Liquid, Place 100 mcg under the tongue every 4 (four) hours as needed. , Disp: 120 each, Rfl: 0  •  hydrocortisone 2.5 % Rectal Cream, Place 1 Application rectally 2 (two) t Denies constipation, diarrhea. Denies pain. Psych:  No complaints. Sleeping well      Palliative Performance Scale:  50%       Physical Examination:   General: alert and oriented, grimacing. Respiratory: Clear to auscultation.   Cardiac: Regular rate

## 2017-10-28 PROBLEM — E87.6 HYPOKALEMIA: Status: ACTIVE | Noted: 2017-01-01

## 2017-10-28 PROBLEM — R53.1 WEAKNESS GENERALIZED: Status: ACTIVE | Noted: 2017-01-01

## 2017-10-28 PROBLEM — D64.9 ANEMIA: Status: ACTIVE | Noted: 2017-01-01

## 2017-10-28 NOTE — ED PROVIDER NOTES
Patient Seen in: [facility] Emergency Department    History   Patient presents with:  Stroke (neurologic)    Stated Complaint: r/o stroke symptoms starting yesterday    HPI    38-year-old male comes in the hospital the chief complaint of having diff Osteoarthritis     left knee   • Personal history of antineoplastic chemotherapy     last chemo therapy 2 weeks ago   • Renal disorder     came with elevated creatinine,ARF   • Shortness of breath     came in with SOB from Cancer center   • Tubular adenoma bowel sounds without rebound, guarding or masses noted  Back nontender without CVA tenderness  Extremity no clubbing, cyanosis or edema noted.   Full range of motion noted without tenderness  Neuro: No focal deficits noted but decreased strength is noted th [employer]) NRDR ([address]) which includes the Dose Index Registry. PATIENT STATED HISTORY: (As transcribed by Technologist)  Pt here with increased fatigue and generalized weakness.  Pt stating he fell this morning blossom starting yesterday  PATIENT STATED HISTORY: (As transcribed by Technologist)  Patient has multiple myeloma and is currently receiving radiation treatment. FINDINGS:   Chronic right-sided rib fractures and abnormalities are noted.   Left-sided Port-A-Cath information is transmitted to the ACR ([facility]) NRDR ([address]) which includes the Dose Index Registry. PATIENT STATED HISTORY:(As transcribed by Technologist)  Patient has no additional history.    CONTRAST US BASES:  No visible pulmonary or pleural disease. CONCLUSION:  1. There is gastric wall thickening identified which may be due to lack of distention, however the possibly of an underlying gastric wall process such as gastritis cannot be excluded.  Plea 10/28/2017 Unknown

## 2017-10-28 NOTE — PLAN OF CARE
Patient c/o generalized pain, Dilaudid given as needed. Abrasions noted on arms and right knee. Potassium 2. 9, k-rider infusing. Bed alarm activated.

## 2017-10-28 NOTE — H&P
[facility] HOSPITALIST  History and Physical     Fabiola Yanes Patient Status:  Observation    1950 MRN [id number]   [facility] 4NW-A Attending Fili Greer[address] Day # 0 PCP Clarissa Thao MD     Chief Complaint: Merry Delude creatinine,ARF   • Shortness of breath     came in with SOB from Cancer center   • Tubular adenoma of colon 5/11/2015        Past Surgical History: Past Surgical History:  No date: ADENOIDECTOMY  No date: APPENDECTOMY  8/17/15: BONE MARROW ASPIRATE &BIOPSY meals. Disp: 60 tablet Rfl: 0   escitalopram 20 MG Oral Tab Take 1 tablet (20 mg total) by mouth daily.  Disp: 30 tablet Rfl: 6   POTASSIUM CHLORIDE ER 20 MEQ Oral Tab CR TAKE TWO TABLETS(40MEQ) BY MOUTH EVERY DAY Disp: 180 tablet Rfl: 0   Ipratropium Bromi extremities. Extremities: No edema or cyanosis. Integument: No rashes or lesions. Psychiatric: Appropriate mood and affect.       Diagnostic Data:      Labs:  Recent Labs   Lab  10/28/17   1031   WBC  4.9   HGB  9.9*   MCV  87.8   PLT  154.0       Recen

## 2017-10-28 NOTE — PROGRESS NOTES
NURSING ADMISSION NOTE      Patient admitted via Cart  Oriented to room. Safety precautions initiated. Bed in low position. Call light in reach. Patient admitted from ER. Patient c/o generalized pain. Patient and family informed of plan of care.

## 2017-10-28 NOTE — ED INITIAL ASSESSMENT (HPI)
Pt here with increased fatigue and generalized weakness. Pt stating he fell this morning landing on hands and knees. Pt is currently being treated for mx myeloma. Wife stating he is unable to take pills over the last several days due to weakness.  Pt statin

## 2017-10-29 NOTE — PROGRESS NOTES
Pain not well controlled with dilaudid 4mg every 3 hours. Up to bathroom frequently, gait is unsteady, upper extremities shaky and weak. Needs 2 people assist to bathroom. Potassium recheck 2.8-20 meq kcl rider hanging.   hospitalist eda and dilaudid i

## 2017-10-29 NOTE — PROGRESS NOTES
[facility] HOSPITALIST  Progress Note     Cj Ram Patient Status:  Observation    1950 MRN [id number]   [facility]NW-A [facility] Radha David MD     Chief Complaint: Failure to thr Transdermal Q72H   • Fluticasone Propionate  1 spray Nasal Daily   • Methylnaltrexone Bromide  12 mg Subcutaneous Q48H   • Methylphenidate HCl  5 mg Oral BID with meals   • Pantoprazole Sodium  40 mg Oral QAM AC   • atorvastatin  10 mg Oral Nightly   • ramone

## 2017-10-29 NOTE — SLP NOTE
ADULT SWALLOWING EVALUATION    ASSESSMENT    ASSESSMENT/OVERALL IMPRESSION:  Orders were received for a bedside swallowing evaluation as patient's wife reports patient with swallowing difficulty.  Patient denies any difficulty and states, \"I have no appeti HISTORY  Reason for Referral: R/O aspiration; Other (Comment) (Wife reports patient with swallowing difficulty.)    Problem List  Principal Problem:    Weakness generalized  Active Problems:    Hyperlipidemia    Depression    Gastroesophageal reflux disease ORAL MOTOR EXAMINATION  Dentition: Functional  Symmetry: Within Functional Limits  Strength:  Within Functional Limits  Tone: Within Functional Limits  Range of Motion: Within Functional Limits  Rate of Motion: Within Functional Limits    Voice Quality: C

## 2017-10-29 NOTE — PLAN OF CARE
Patient c/o moderate- severe generalized pain. Patient with poor oral intake. Dilaudid given as needed, with mild relief. Patient's vital signs stable. 1910- Patient with weak hand grasp, left hand weaker than right.

## 2017-10-29 NOTE — CONSULTS
THE [facility] Oncology [facility] Report of Consultation      Patient Name: Javy Son   YOB: 1950  Medical Record Number: [id number]  Consulting Physician: Rosales Barba M.D.    Referring Physician: Reece Arnold*    Date of Surgical History:  No date: ADENOIDECTOMY  No date: APPENDECTOMY  8/17/15: BONE MARROW ASPIRATE &BIOPSY  9/22/2017: COLONOSCOPY N/A      Comment: Procedure: COLONOSCOPY;  Surgeon: Damaris Gomez DO;  Location: [facility] ENDOSCOPY  No date: PORT, infusion  Intravenous Continuous   Enoxaparin Sodium (LOVENOX) 40 MG/0.4ML injection 40 mg 40 mg Subcutaneous Daily   acetaminophen (TYLENOL) tab 650 mg 650 mg Oral Q6H PRN   docusate sodium (COLACE) cap 100 mg 100 mg Oral BID   PEG 3350 (MIRALAX) powder p atraumatic. Eyes Conjunctiva clear; sclera anicteric. ENMT External nose normal; external ears normal.  Neck Supple. Hematologic/Lymphatic No no petechiae or purpura.   Respiratory Normal effort; no respiratory distress; clear to auscultation bilaterally to the [facility]) [address] ([address]) which includes the Dose Index   Registry. PATIENT STATED HISTORY: (As transcribed by Technologist)  Pt here with increased fatigue and generalized weakness.  Pt stating h symptoms starting yesterday     PATIENT STATED HISTORY: (As transcribed by Technologist)  Patient has multiple myeloma and is currently receiving radiation treatment. FINDINGS:       Chronic right-sided rib fractures and abnormalities are noted.

## 2017-10-29 NOTE — DIETARY MALNUTRITION NOTE
[address] [facility]    Pt is at high nutrition risk. Pt meets malnutrition criteria.     NUTRITION DIAGNOSIS/PROBLEM:    Malnutrition related to increasing nutrient needs due to chronic illness as evidenced by 5% wt loss in 1 mon PRESCRIPTION:  Calories: 1919-0329 calories/day (30-35 calories per kg)  Protein: 67-84 grams protein/day (1.2-1.5 grams protein per kg)  Fluid: ~1 ml/kcal or per MD discretion    MONITOR AND EVALUATE/NUTRITION GOALS:    1. PO intake to meet at least 75% p

## 2017-10-30 NOTE — PROGRESS NOTES
Resting quietly, pt c/o weakness to bilateral upper extremities, hand grasps weak, weaker to left, pt doesn't c/o pain to hands but c/o it being cold.  Appetite remains poor, continues on IVF, swallows without difficulty, remains on Tele SR, remained in bed

## 2017-10-30 NOTE — OCCUPATIONAL THERAPY NOTE
OCCUPATIONAL THERAPY EVALUATION - INPATIENT     Room Number: 507/202-N  Evaluation Date: 10/30/2017  Type of Evaluation: Initial  Presenting Problem: Generalized weakness; pt with hx of multiple myeloma    Physician Order: IP Consult to Occupational Ely Davila Depression    Gastroesophageal reflux disease    Poor appetite    Anemia    Hypokalemia    Acute pain of both shoulders    Ileus (HCC)    Multiple myeloma not having achieved remission (HCC)    Weakness of upper extremity    Contracture of muscle, left keita None    Occupation/Status: Retired  Hand Dominance: Right  Drives: No  Patient Regularly Uses: None    Prior Level of Function: Patient has become increasingly weaker and more dependent over last 5 months.  Pt lives with wife in two story home and she is hi Total  -   Toileting, which includes using toilet, bedpan or urinal? : Total  -   Putting on and taking off regular upper body clothing?: A Lot  -   Taking care of personal grooming such as brushing teeth?: A Lot  -   Eating meals?: A Little    AM-PAC Scor patient’s ability to participate in dressing, bathing, toileting, instrumental activities of daily living, rest and sleep, leisure and social participation.      The patient is functioning below his previous functional level and would benefit from skilled i with mod assist    Functional Transfer Goals  Patient will perform all functional transfers:  with mod assist    UE Exercise Program Goal  Patient will be supervision with bilateral AROM HEP (home exercise program).

## 2017-10-30 NOTE — PROGRESS NOTES
[address] [facility] Preliminary Note    Cj Ram Patient Status:  Inpatient    1950 MRN [id number]   [facility] 4NW-A Attending Garry Guidry [facility] Day # 0 PCP Radha David MD     [address] [place] [address] multiple myeloma   • Cataract     cataract surgery 2 1/2 weeks ago L eye   • Depression    • Exposure to radiation     radiation on shoulders & side in the past; supposed to start this week but did not    • Family history of colon cancer 5/11/2015   • JAYLAN (DURAGESIC) 75 MCG/HR 1 patch 1 patch Transdermal Q72H   Fluticasone Propionate (FLONASE) 50 MCG/ACT nasal spray 1 spray 1 spray Nasal Daily   hydrocortisone (ANUSOL-HC) 2.5 % rectal cream 1 Application 1 Application Rectal BID PRN   Ipratropium Bromide (A brief flicker in toes  Sensory: reports decreased sensation on feet  Coordination: FTN intact with difficulty  Gait: Deferred        DIAGNOSTIC DATA:  CBC anemia (from chemo)  CMP with low albumin, otherwise unremarkable  TSH WNL      IMAGING:  [facility] 10/28/1

## 2017-10-30 NOTE — CM/SW NOTE
10/30/17 1400   CM/SW Referral Data   Referral Source Social Work (self-referral)   Reason for Referral Discharge planning   Informant Patient;Spouse   Readmission Assessment   Pt. received education on diagnoses at time of discharge?  Yes   Did you know has hx of [facility] but does not currently have [address] in place. Pt follows up regularly with Hannah from outpatient palliative care. Pt's spouse asked if it would be possible to obtain a 3-in-1 commode as well.  ARTURO can assist with this if pt d/c home, otherwise YELENA

## 2017-10-30 NOTE — SLP NOTE
SPEECH DAILY NOTE - INPATIENT    ASSESSMENT & PLAN   ASSESSMENT  Pt seen for dysphagia tx to assess tolerance with recommended diet, ensure proper utilization of aspiration precautions and provide pt/family education.  Patient denied dysphagia for food/liqu demonstrate understanding and implementation of aspiration precautions and swallow strategies independently over 1 session(s).   Met        FOLLOW UP  Follow Up Needed: No  SLP Follow-up Date: 10/30/17  Number of Visits to Meet Established Goals: 1  Session

## 2017-10-30 NOTE — PROGRESS NOTES
THE [facility] Progress Note      Patient Name: Lyla Ayala   YOB: 1950  Medical Record Number: [id number]  Attending Physician: Osvaldo Iyer.  Nilson Morin M.D.       SUBJECTIVE:  Johanna Florez is a(n) 69year old male with nons Potassium 3.9 3.6 - 5.1 mmol/L   Chloride 111 101 - 111 mmol/L   CO2 22.0 22.0 - 32.0 mmol/L   -CBC W/ DIFFERENTIAL   Collection Time: 10/30/17  6:13 AM   Result Value Ref Range   WBC 3.7 (L) 4.0 - 13.0 x10(3) uL   RBC 2.78 (L) 3.80 - 5.80 x10(6)uL   HGB Rectal BID PRN   Ipratropium Bromide (ATROVENT) 0.02 % nebulizer solution 0.5 mg 0.5 mg Nebulization Q6H PRN   Methylphenidate HCl (RITALIN) tab 5 mg 5 mg Oral BID with meals   Pantoprazole Sodium (PROTONIX) EC tab 40 mg 40 mg Oral QAM AC   atorvastatin (L not clear. Patient was given dexamethasone BID yesterday and today states he has no shoulder pain. Will hold off on further steroids unless pain returns. If so, would try a lower dose of 4 mg bid initially and wean down.      2.   Bilateral distal upper ext

## 2017-10-30 NOTE — PLAN OF CARE
GASTROINTESTINAL - ADULT    • Maintains or returns to baseline bowel function Progressing    • Maintains adequate nutritional intake (undernourished) Progressing        HEMATOLOGIC - ADULT    • Maintains hematologic stability Progressing        Impaired Sw

## 2017-10-30 NOTE — PROGRESS NOTES
[facility]  Progress Note     Colten Plush Patient Status:  Observation    1950 MRN [id number]   [facility] 4NW-A Attending [facility] Day # 0 PCP Dona Rubio MD     Chief Complaint: Failure to thr 10/28/17   1031   TROP  <0.046            Imaging: Imaging data reviewed in Epic.     Medications:   • hydrocortisone   Rectal BID   • Methylnaltrexone Bromide  12 mg Subcutaneous Q48H   • escitalopram  20 mg Oral Daily   • fentaNYL  1 patch Transdermal Q72

## 2017-10-31 PROBLEM — E46 MALNUTRITION (HCC): Status: ACTIVE | Noted: 2017-01-01

## 2017-10-31 PROBLEM — S12.9XXA CLOSED BURST FRACTURE OF CERVICAL VERTEBRA (HCC): Status: ACTIVE | Noted: 2017-01-01

## 2017-10-31 PROBLEM — M84.58XA PATHOLOGICAL FRACTURE OF VERTEBRA DUE TO NEOPLASTIC DISEASE: Status: ACTIVE | Noted: 2017-01-01

## 2017-10-31 NOTE — ANESTHESIA POSTPROCEDURE EVALUATION
[address] [place] [address]  Patient Status:  Inpatient   Age/Gender 69year old male MRN [id number]   [facility] 6NE-A Attending Kelley Nelson, DO   Hosp Day # 1 PCP Suze Ko MD       Anesthesia Post-op Note    Procedure(s):

## 2017-10-31 NOTE — BRIEF OP NOTE
Pre-Operative Diagnosis: Spinal cord injury at C5-C7 level without injury of spinal bone, [facility] [S14.105A]     Post-Operative Diagnosis: same as pre op     Procedure Performed:   Procedure(s):  Posterior cervical deompressive laminecto

## 2017-10-31 NOTE — ANESTHESIA PREPROCEDURE EVALUATION
PRE-OP EVALUATION    Patient Name: Ashli Anderson    Pre-op Diagnosis: Spinal cord injury at C5-C7 level without injury of spinal bone, initial encounter ([facility] [C34.941Q]    Procedure(s):  Posterior cervical deompressive laminectomy and posterior lateral famoTIDine (PEPCID) injection 20 mg 20 mg Intravenous BID   HYDROmorphone HCl (DILAUDID) tab 8 mg 8 mg Oral Q3H PRN   [COMPLETED] potassium chloride IVPB premix 20 mEq 20 mEq Intravenous Once   HYDROmorphone HCl PF (DILAUDID) 1 MG/ML injection 2-4 mg 2-4 Intravenous Once       Outpatient Medications:    Zolpidem Tartrate 10 MG Oral Tab Take 1 tablet (10 mg total) by mouth nightly as needed for Sleep.  Disp: 30 tablet Rfl: 3   FentaNYL (SUBSYS) 100 MCG Sublingual Liquid Place 100 mcg under the tongue every 4 Allergies: Review of patient's allergies indicates no known allergies. Anesthesia Evaluation    Patient summary reviewed.     Anesthetic Complications  (-) history of anesthetic complications         GI/Hepatic/Renal      (+) GERD 10/30/2017   RBC 2.78 (L) 10/30/2017   HGB 8.1 (L) 10/30/2017   HCT 25.0 (L) 10/30/2017   MCV 89.9 10/30/2017   MCH 29.1 10/30/2017   MCHC 32.4 10/30/2017   RDW 16.8 (H) 10/30/2017   .0 (L) 10/30/2017       Lab Results  Component Value Date

## 2017-10-31 NOTE — PROGRESS NOTES
[id number] [facility] Progress Note    Chares Pass Patient Status:  Inpatient    1950 MRN [id number]   [facility] 6NE-A Attending Thiago Knox, DO   Hosp Day # 1 PCP Toni Soto MD     Subjective:   Henry Kaur articular process, this is therefore likely arthritic change. 7. Degenerative changes are detailed above without additional areas of spinal stenosis or cord compromise. Assessment:  1.   S/P Posterior Cervical Decompressive Laminectomy and posterior l

## 2017-10-31 NOTE — OCCUPATIONAL THERAPY NOTE
New order received for OT eval post operatively, however, Pt intubated and not appropriate for therapy. Will follow up 11/1/17.

## 2017-10-31 NOTE — ANESTHESIA PREPROCEDURE EVALUATION
PRE-OP EVALUATION    Patient Name: Mandy Noel    Pre-op Diagnosis: * Possible cord compression/multiple myeloma progression*    * MRI Brain, entire spine *    Yousuf Momin*    Pre-op vitals reviewed.   Temp: 98.5 °F (36.9 °C)  Pulse: 69  Resp: 20  BP: 156/6 Nightly   Zolpidem Tartrate (AMBIEN) tab 10 mg 10 mg Oral Nightly PRN   0.9%  NaCl infusion  Intravenous Continuous   Enoxaparin Sodium (LOVENOX) 40 MG/0.4ML injection 40 mg 40 mg Subcutaneous Daily   acetaminophen (TYLENOL) tab 650 mg 650 mg Oral Q6H PRN Inhalation Aero Soln Inhale 2 puffs into the lungs 4 (four) times daily as needed for Wheezing.  Disp: 1 Inhaler Rfl: 0   Pantoprazole Sodium 40 MG Oral Tab EC Take 1 tablet (40 mg total) by mouth every morning before breakfast. Disp: 60 tablet Rfl: 0   fen Multiple myeloma, possible involvement of cervical spine.  Pt unable to move limbs       Past Surgical History:  No date: ADENOIDECTOMY  No date: APPENDECTOMY  8/17/15: BONE MARROW ASPIRATE &BIOPSY  9/22/2017: COLONOSCOPY N/A      Comment: Procedure: discussed with: patient, spouse and child/children                Present on Admission:  • Anemia  • Depression  • Gastroesophageal reflux disease  • Hyperlipidemia  • Poor appetite

## 2017-10-31 NOTE — CONSULTS
[facility]  Report of Consultation    Monique Cuba Patient Status:  Inpatient    1950 MRN [id number]   [facility] 6NE-A Attending Keith Chappell,    Hosp Day # 1 PCP Bree Hutchinson MD     Reason for Consultation: Brianna Thomas alcohol. He reports that he does not use drugs. Medications:    Prescriptions Prior to Admission:  Zolpidem Tartrate 10 MG Oral Tab Take 1 tablet (10 mg total) by mouth nightly as needed for Sleep.  Disp: 30 tablet Rfl: 3 10/27/2017   FentaNYL (SUBSYS) 1 Suspension 1 spray by Nasal route daily. Disp: 1 Bottle Rfl: prn 10/27/2017   Ascorbic Acid (VITAMIN C) 1000 MG Oral Tab Take 1,000 mg by mouth daily.  Disp:  Rfl:  10/27/2017   Cholecalciferol (VITAMIN D) 1000 UNITS Oral Tab Take 1,000 Units by mouth daily 87.8  90.1  89.9   MCH  29.4  29.4  29.1   MCHC  33.4  32.6  32.4   RDW  17.4*  17.2*  16.8*   NEPRELIM  3.78   --   3.34   WBC  4.9  5.0  3.7*   PLT  154.0  159.0  111.0*     Recent Labs   Lab  10/28/17   1031  10/29/17   0042  10/29/17   0616  10/30/17

## 2017-10-31 NOTE — ANESTHESIA POSTPROCEDURE EVALUATION
[facility]  Patient Status:  Inpatient   Age/Gender 69year old male MRN [id number]   [facility] 6NE-A Attending [facility] Day # 0 PCP Aura Wellington MD       Anesthesia Post-op Note    * No proc

## 2017-10-31 NOTE — PLAN OF CARE
Received pt from MRI, vented. CXR obtained and placement confirmed ETT. Propofol started for sedation. Plan for surgery around midnight per . Roberto gtt started to keep MAP >90. Bladder scan-967ml, rangel inserted with 1350 urine returned.   Type a

## 2017-10-31 NOTE — PROGRESS NOTES
Met with patient's wife. The patient was in radiology. I spoke with Dr. [place] Sims. I spoke with Dr. Bao Song. Clinical suspicion is that of myeloma progression involving the cervical spine. He apparently cannot move his arms or legs.   St

## 2017-10-31 NOTE — PROGRESS NOTES
[facility] Progress Note      Patient Name: Dariel García   YOB: 1950  Medical Record Number: [id number]  Attending Physician: Liane Leyva.  Corky Onofre M.D.       SUBJECTIVE:  Ralph Pendleton is a(n) 69year old male with nons 100 %   ISTAT Blood Gas Base Deficit -4.0 mmol/L   -POCT ISTAT CG8 CARTRIDGE   Collection Time: 10/31/17  3:30 AM   Result Value Ref Range   ISTAT Sodium 142 136 - 144 mmol/L   ISTAT Potassium 4.0 3.6 - 5.1 mmol/L   ISTAT Ionized Calcium 1.02 (L) 1.12 - 1. [facility]) injection 4 mg 4 mg Intravenous PRN   sodium chloride 0.9% IV bolus 500 mL 500 mL Intravenous Once PRN   Senna (SENOKOT) tab TABS 17.2 mg 17.2 mg Oral Nightly   docusate sodium (COLACE) cap 100 mg 100 mg Oral BID   PEG 3350 (MIRALAX) powder packet Intravenous Continuous   phenylephrine in NaCl (MARAI INES-SYNEPHRINE) 50 mg/250 ml premix infusion SOLN      phenylephrine in NaCl (MARIA INES-SYNEPHRINE) 50 mg/250 ml premix infusion SOLN 100-200 mcg/min Intravenous Continuous   fentaNYL citrate (SUBLIMAZE) 0.05 MG/ML intravenous gadolinium injection. CONTRAST USED:  11 cc of paramagnetic gadolinium-based contrast was injected intravenously.      FINDINGS:  Enhancing lesion of the C3 vertebral body is associated with asymmetric loss of height, greater on the right 10th rib, with mild surrounding soft tissue enhancement centered around   the posterior 10th rib and extending into the right T9-10 and T10-11 neural foramina.         There several discrete small pleural-based enhancing soft tissue nodules posteromedially therefore likely arthritic change. 7. Degenerative changes are detailed above without additional areas of spinal stenosis or cord compromise.    8. Critical findings detailed above in numbers 1, 2 and 3 were telephoned to the patient's nurse Kang Nielsen

## 2017-10-31 NOTE — PROGRESS NOTES
Patient assessed post posterior cervical decompressive laminectomy, posterior lateral mass fusion with lateral mass screws and rods of C2-C7. Remains sedated and difficult to arouse at this time. Notes from late last night reviewed.     Incomplete Spina

## 2017-10-31 NOTE — CONSULTS
[facility]    Report of Consultation    Minda Harada Patient Status:  Inpatient    1950 MRN [id number]   [facility] 4NW-A [facility] PCP Michiel Ormond, MD     Date of Admission:  10/2 disease)    • History of acute pancreatitis    • Hyperlipidemia    • Impaired fasting glucose 5/20/2014   • Multiple myeloma ([facility]     7/2015 biopsy of rib lesion   • Multiple myeloma (HCC)    • Osteoarthritis     left knee   • Personal history of antineop Application, 1 Application, Rectal, BID PRN  •  Ipratropium Bromide (ATROVENT) 0.02 % nebulizer solution 0.5 mg, 0.5 mg, Nebulization, Q6H PRN  •  Methylphenidate HCl (RITALIN) tab 5 mg, 5 mg, Oral, BID with meals  •  Pantoprazole Sodium (PROTONIX) EC tab The rest of the cranial nerves are grossly intact. Sensation absent propioception in toes and ankles, absent vibration in toes, decreased at knees.   Motor:  UE's has minimal movement- deltoids 2/5, triceps 0/5, biceps 1/5, distally 1/5, LE's- HF 0/5, addu due to opioid therapy     Leukopenia     Gastroesophageal reflux disease     Palliative care by specialist     Nausea     Poor appetite     KAMLA (acute kidney injury) ([facility]     Toxic metabolic encephalopathy     Iron deficiency anemia due to chronic blood l

## 2017-10-31 NOTE — PHYSICAL THERAPY NOTE
New order received for PT eval post operatively, however, Pt intubated and not appropriate for therapy. Will follow up 11/1/17.

## 2017-10-31 NOTE — PROGRESS NOTES
Patient underwent decompressive laminectomy to debulk malignant epidural mass with posterior cord compression at C5/C6. Spinal stabilization from C2 through C7. Currently recovering, intubated.   Consolidative radiation to the cervical spine will be c

## 2017-10-31 NOTE — CONSULTS
CONSULTATION - NEUROSURGEON    PATIENT NAME: Meghann Hassan, : 1950, 69year old male   MR# [id number] [facility]# [id number]    RE: spinal cord injury    HPI: This is a 68-year-old male with known metastatic multiple myeloma to multiple areas of the body. the time when his arms and legs were weak. Prior to admission meds:    Prescriptions Prior to Admission:  Zolpidem Tartrate 10 MG Oral Tab Take 1 tablet (10 mg total) by mouth nightly as needed for Sleep.  Disp: 30 tablet Rfl: 3 10/27/2017   FentaNYL (CASTRO Suspension 1 spray by Nasal route daily. Disp: 1 Bottle Rfl: prn 10/27/2017   Ascorbic Acid (VITAMIN C) 1000 MG Oral Tab Take 1,000 mg by mouth daily.  Disp:  Rfl:  10/27/2017   Cholecalciferol (VITAMIN D) 1000 UNITS Oral Tab Take 1,000 Units by mouth daily disease)    • History of acute pancreatitis    • Hyperlipidemia    • Impaired fasting glucose 5/20/2014   • Multiple myeloma ([facility]     7/2015 biopsy of rib lesion   • Multiple myeloma (HCC)    • Osteoarthritis     left knee   • Personal history of antineop Extraocular muscles are intact. Face is symmetric. Cranial nerves II through XII are intact. Neuro: The patient is profoundly plegic in all 4 extremities.   The patient is able to wiggle his toes to command and all other strength in the lower extremities level all of which are less than 3 I would put the patient spinal cord injury as [facility] ALIZE Lizzy Bob is a 69year oldmale that has been admitted for Weakness generalized, HD#0.     Patient Active Problem List:     Allergic rhinitis     Impaired fasti which have been ordered I will discuss with the patient's wife and son who he has designated as the decision-makers while he is under general anesthetic.   The patient has an incomplete spinal cord injury and may benefit from decompression and potential fus CT and MRI, assisting in in-line stabilization with the anesthesiologist for intubation for the MRI. Discussion of the condition and prognosis with the family and the patient. Randy Corona DO  10/30/2017 9:26 PM    Primary Care Physician:  Christina Patel

## 2017-11-01 NOTE — DIETARY NOTE
[facility]     NUTRITION follow-up ASSESSMENT     Pt is at high nutrition risk.  Pt meets moderate malnutrition criteria.     NUTRITION DIAGNOSIS/PROBLEM:     Malnutrition related to increasing nutrient needs due to chronic illness as evidenced by 5% w good idea to try.      ANTHROPOMETRICS:  Ht: 167.6 cm (5' 6\")  Wt: 56.2 kg (124 lb). This is 87% of IBW  BMI: Body mass index is 20.01 kg/m².   IBW: 64.5 kg  Usual Body Wt: 57.4 kg approximately 1 month ago     WEIGHT HISTORY:   Patient Weight for the past

## 2017-11-01 NOTE — PROGRESS NOTES
[id number] [facility] Progress Note    Jolly Ramachandran Patient Status:  Inpatient    1950 MRN [id number]   [facility] 6NE-A Attending Ben Sandhoff, DO   Hosp Day # 2 PCP Shaina Chen MD     Subjective:   Henry Kaur of the spinal cord with extension into the C2-C3 neural foramina bilaterally. The degree of cord displacement/compromise appears less severe than on recent CT scan.   3. Tumor involvement of the right T9 transverse process, right T10 pedicle and transverse indefinitely. Sivan Grimes

## 2017-11-01 NOTE — OCCUPATIONAL THERAPY NOTE
OCCUPATIONAL THERAPY EVALUATION - INPATIENT     Room Number: 0893/4791-G  Evaluation Date: 11/1/2017  Type of Evaluation: Re-evaluation  Presenting Problem: Generalized weakness; pt with hx of multiple myeloma    Physician Order: IP Consult to Occupational Weakness generalized  Active Problems:    Hyperlipidemia    Depression    Gastroesophageal reflux disease    Poor appetite    Anemia    Hypokalemia    Acute pain of both shoulders    Ileus (HCC)    Multiple myeloma not having achieved remission (HCC)    We left  No date: TONSILLECTOMY    OCCUPATIONAL PROFILE    HOME SITUATION  Type of Home: House  Home Layout: Two level  Lives With: Spouse    Toilet and Equipment: Standard height toilet  Shower/Tub and Equipment: Walk-in shower  Other Equipment: None    Occu shortness of breath    ACTIVITIES OF DAILY LIVING ASSESSMENT  AM-PAC ‘6-Clicks’ Inpatient Daily Activity Short Form  How much help from another person does the patient currently need…  -   Putting on and taking off regular lower body clothing?: Total  - deconditioning, poor activity tolerance, BADL/IADL dysfunction, decreased balance, strength, ROM and functional mobility.  These deficits impact the patient’s ability to participate in dressing, bathing, toileting, instrumental activities of daily living, r assist  Patient will perform upper body dressing:  with mod assist  Patient will perform lower body dressing:  with mod assist  Patient will perform toileting: with mod assist    Functional Transfer Goals  Patient will perform all functional transfers:  wi

## 2017-11-01 NOTE — PROGRESS NOTES
INPATIENT PROGRESS NOTE    Assessment:   Ivania Mensah in room 2006/7669-S, is a 69year old male, Hospital Day ( LOS: 1 day ) hospitalized for Weakness generalized.     Post-Op Day 1 Day Post-Op s/p Procedure(s):  POSTERIOR CERVICAL DECOMPRESSION 1 LEVEL significant drainage, no dehiscence, no significant erythema   DVT Evaluation:  No evidence of DVT seen on physical exam.     Clinical exam:  Aox4, nad  cn2-12 intact  perrl  Shaw spont  Deltoids 4-, triceps 3/5, biceps 3/5,  2/5  LE are 3/5 with except

## 2017-11-01 NOTE — PLAN OF CARE
Assume care 0730. NO turning, pupil checks only for first several hours per Dr Radha Betancourt neurosurgery and continue sedation for that time. Roberto gtt on to keep Map >90 as ordered. HOB 30 degrees. Aspen c-collar on and in place.  Hold Lovenox SQ for today per LEANNA

## 2017-11-01 NOTE — CONSULTS
[facility]  Neurocritical Care     Late entry: pt seen 10/31 at 9:30am    Subjective: Melvin Wills is a(n) 69year old man with multiple myeloma admitted to hospital 10/27 with diffuse weakness now s/p posterior cervical fusion.   Pt needed for Wheezing.  Disp: 1 Inhaler Rfl: 0   Pantoprazole Sodium 40 MG Oral Tab EC Take 1 tablet (40 mg total) by mouth every morning before breakfast. Disp: 60 tablet Rfl: 0   fentaNYL 75 MCG/HR Transdermal Patch 72 Hr Place 1 patch onto the skin every t Nonspecific diffuse air-filled distention of colon is noted. This may be due to an ileus.     Dictated by: Amy Kumar MD on 10/29/2017 at 15:44     Approved by: Amy Kumar MD            Ct Brain Or Head (96260)    Result Date: 10/28/2017  AL process seen. OTHER:             None. CONCLUSION:  Stable sequelae of chronic microvascular ischemic changes. No acute intracranial hemorrhage, mass effect or midline shift. There is nothing specific for an acute territorial infarct.     Dictated by: causes dorsal compromise of the spinal cord, narrowing the cord to approximately 4 mm AP in the midline. This is further compromised by broad-based posterior disc/osteophyte complex at the C5-6 level impressing upon the ventral aspect of the thecal sac.  Melanie Adam INDICATIONS:  spinal cord compression  TECHNIQUE:  A variety of imaging planes and parameters were utilized for visualization of suspected pathology prior to and after intravenous gadolinium injection.    CONTRAST USED:  11 cc of paramagnetic gadolinium-bas signal alteration and enhancement involving the right T10 pedicle and articular process, right T9 transverse process and right posterior 10th rib, with mild surrounding soft tissue enhancement centered around the posterior 10th rib and extending into the r abnormal marrow signal or enhancement is seen associated with the right C5 articular process, this is therefore likely arthritic change. 7. Degenerative changes are detailed above without additional areas of spinal stenosis or cord compromise.  8. Critical bilaterally. Inferior lateral right heel rib fractures noted. There appears be calcified pleural plaques bilaterally. CONCLUSION:  Marked COPD changes which are stable.     Dictated by: Bedelia Claude, MD on 10/31/2017 at 8:08     Approved by: Elayne Holt PORTABLE (CPT=71010)  TECHNIQUE:  AP chest radiograph was obtained. COMPARISON:  CHRISTOPHE SPENCE CHEST AP PORTABLE  (CPT=71010), 9/19/2017, 13:29.   INDICATIONS:  AMS, CHILLS, SHAKING  PATIENT STATED HISTORY: (As transcribed by Technologist)  Pt provided no ad unremarkable. No additional pancreatic lesions or evidence of peripancreatic fluid/inflammation noted. SPLEEN:  No enlargement or focal lesion. KIDNEYS:  Stable, simple nonenhancing bilateral renal cysts. No significant interval change.  ADRENALS:  No mass upon retrospective review. No evidence of acute pancreatitis or pancreatic ductal dilatation. 4. Stable simple bilateral renal cysts. 5. Stable mixed lytic/blastic osseous changes involving the lower lumbar spine, sacrum and right iliac wing.     Dictated b suspension 30 mL 30 mL Oral Daily PRN   bisacodyl (DULCOLAX) rectal suppository 10 mg 10 mg Rectal Daily PRN   FLEET ENEMA (FLEET) 7-19 GM/118ML enema 133 mL 1 enema Rectal Once PRN   ondansetron HCl (ZOFRAN) injection 4 mg 4 mg Intravenous Q4H PRN   Metoc 0.02 % nebulizer solution 0.5 mg 0.5 mg Nebulization Q6H PRN   Methylphenidate HCl (RITALIN) tab 5 mg 5 mg Oral BID with meals       Assesment/Plan:   Principal Problem:    Weakness generalized  Active Problems:    Hyperlipidemia    Depression    Tyson Brazil instability. This involved direct patient intervention, complex decision making, and/or extensive discussions with the patient, family, and clinical staff. Thank you for allowing me to participate in the care of this patient.      Janeth Collins MD  Ne

## 2017-11-01 NOTE — PROGRESS NOTES
[facility]  Progress Note     Ivania Ironlisa Patient Status:  Inpatient    1950 MRN [id number]   [facility] 6NE-A Attending Jami Moore, DO   Hosp Day # 2 PCP Dolores MD Mary     Chief Complaint: Shoulder pain/weaknes results for input(s): TROP, CK in the last 72 hours. Imaging: Imaging data reviewed in Epic.     Medications:   • potassium chloride  20 mEq Intravenous Once   • dexamethasone Sodium Phosphate  3 mg Intravenous Q8H    Followed by   • [START ON 11/2/

## 2017-11-01 NOTE — PLAN OF CARE
Problem: Impaired Swallowing  Goal: Minimize aspiration risk  Interventions:  - Patient should be alert and upright for all feedings (90 degrees preferred)  - Offer food and liquids at a slow rate  - Pinched straw  - Encourage small bites of food and small

## 2017-11-01 NOTE — SLP NOTE
ADULT SWALLOWING EVALUATION    ASSESSMENT    ASSESSMENT/OVERALL IMPRESSION:  Orders received for bedside swallow evaluation. S/p posterior cervical decompressive laminectomy for cord compression and posterior lateral mass fusion C2-C7, POD #1.   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

## 2017-11-01 NOTE — PHYSICAL THERAPY NOTE
PHYSICAL THERAPY EVALUATION - INPATIENT     Room Number: 1307  Evaluation Date: 11/1/2017  Type of Evaluation: Re-evaluation  Physician Order: PT Eval and Treat    Presenting Problem: s/p C2-C7 posterior cervical decompression 10/31/17  Reason for Ther neoplastic disease    Closed burst fracture of cervical vertebra ([facility]    Malnutrition ([facility]      Past Medical History  Past Medical History:   Diagnosis Date   • Allergic rhinitis    • Anemia associated with chemotherapy 11/12/2015   • Back problem    • B short household distances. SUBJECTIVE  \"My neck really hurts. \"    Patient self-stated goal is to be pain free.      OBJECTIVE  Precautions: Cervical brace;Spine;Drain(s)  Fall Risk: High fall risk    WEIGHT BEARING RESTRICTION  Weight Bearing Restricti Impairment Score: 100%   Standardized Score (AM-PAC Scale): 23.55   CMS Modifier (G-Code): CN    FUNCTIONAL ABILITY STATUS  Gait Assessment   Gait Assistance: Not tested                   Skilled Therapy Provided: Patient received supine in bed and is agre evaluation, patient's clinical presentation is evolving and overall the evaluation complexity is considered high. These impairments and comorbidities manifest themselves as functional limitations in independent bed mobility, transfers, and gait.  The patien

## 2017-11-01 NOTE — PROGRESS NOTES
[facility]  Progress Note    Ramu Dumont Patient Status:  Inpatient    1950 MRN [id number]   [facility] 6NE-A Attending Brian Ruiz MD   [facility] Day # 2 PCP Suze Ko MD     Subjective:   Ramu Dumont is a(n) 67 year the cervical spine performed 10/30/17, postsurgical changes are now present as detailed above. Improvement of posterior subluxation at the C3-4 level.       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  Mu

## 2017-11-01 NOTE — PROGRESS NOTES
[facility]  Progress Note    Colten Mandujano Patient Status:  Inpatient    1950 MRN [id number]   [facility] 6NE-A Attending Jhonny Quach MD   [facility] Day # 2 PCP Dona Rubio MD     Subjective:   Colten Mandujano is a(n) 67 year 9528  10/31/17   0636   RBC  2.78*  3.04*   HGB  8.1*  9.2*   HCT  25.0*  27.8*   MCV  89.9  91.4   MCH  29.1  30.3   MCHC  32.4  33.1   RDW  16.8*  16.8*   NEPRELIM  3.34  6.87*   WBC  3.7*  7.7   PLT  111.0*  164.0     Recent Labs   Lab  10/30/17   5861

## 2017-11-01 NOTE — PLAN OF CARE
GASTROINTESTINAL - ADULT    • Maintains or returns to baseline bowel function Progressing    • Maintains adequate nutritional intake (undernourished) Progressing        HEMATOLOGIC - ADULT    • Maintains hematologic stability Progressing        Impaired Ac

## 2017-11-02 NOTE — PROGRESS NOTES
[facility]  Progress Note    Minda Harada Patient Status:  Inpatient    1950 MRN [id number]   [facility] 6NE-A Attending Elva Judge MD   [facility] Day # 3 PCP Michiel Ormond, MD     Subjective:   Minda Harada is a(n) 67 year MCH  30.4  30.9  30.4   MCHC  34.2  34.9  33.9   RDW  16.5*  16.7*  17.1*   NEPRELIM  4.58  5.19  5.00   WBC  5.2  6.0  5.6   PLT  84.0*  89.0*  83.0*     Recent Labs   Lab  10/31/17   0636  11/01/17   0457  11/01/17   1813  11/02/17   0358   GLU  130*

## 2017-11-02 NOTE — PROGRESS NOTES
[id number] [facility] Neuro[facility] Progress Note    [place]  Patient Status:  Inpatient    1950 MRN [id number]   [facility] 6NE-A Attending Kelley Nelson, DO   Hosp Day # 3 PCP Suze Ko MD     Subjective:   Henry Kaur fusion of C2-7 POD #2   2. Acute Respiratory Insufficiency secondary to #1 - resolved  3. Cervical Cord Compression secondary to Multiple Myeloma Lesion  4. Metastatic Multiple Myelopma    Plan:  1. Neuro checks Q4 hours  2.  Keep MAP >90 for 48 hours post-

## 2017-11-02 NOTE — PROGRESS NOTES
[facility]  Progress Note     Melvin Elma Patient Status:  Inpatient    1950 MRN [id number]   [facility] 6NE-A Attending Venu Metcalf DO   Hosp Day # 3 PCP Wesley Carreno MD     Chief Complaint: Shoulder pain/weaknes BILT  0.4  0.3   --   0.3   TP  4.7*  4.6*   --   5.1*       Estimated Creatinine Clearance: 83.4 mL/min (based on SCr of 0.65 mg/dL (L)).     Recent Labs   Lab  10/31/17   0009   PTP  14.0   INR  1.08       No results for input(s): TROP, CK in the last 7 RN.    Rodolfo Bumpers, MD

## 2017-11-02 NOTE — PLAN OF CARE
HEMATOLOGIC - ADULT    • Maintains hematologic stability Progressing        METABOLIC/FLUID AND ELECTROLYTES - ADULT    • Electrolytes maintained within normal limits Progressing        NEUROLOGICAL - ADULT    • Achieves stable or improved neurological sta

## 2017-11-02 NOTE — PROGRESS NOTES
[facility]    Neurocritical care Progress Note    Ivania Mensah Patient Status:  Inpatient    1950 MRN [id number]   [facility] 6NE-A Attending Brigitte Ellis MD   [address] Day # 2 PCP Dolores Hernandez MD     Subjective:   Edwige Dominguez Intravenous Q4H PRN   hydrALAzine HCl (APRESOLINE) injection 10 mg 10 mg Intravenous Q4H PRN   Senna (SENOKOT) tab TABS 17.2 mg 17.2 mg Oral Nightly   docusate sodium (COLACE) cap 100 mg 100 mg Oral BID   PEG 3350 (MIRALAX) powder packet 17 g 17 g Oral Lien Lovett with meals       Labs:    Lab Results  Component Value Date   WBC 5.2 11/01/2017   HGB 8.0 11/01/2017   HCT 23.4 11/01/2017   PLT 84.0 11/01/2017   CREATSERUM 0.71 11/01/2017   BUN 12 11/01/2017    11/01/2017   K 3.2 11/01/2017    11/01/2017 at bedside and gave them an update.      Goals of the Day: pain consult,      A total of 35 minutes of critical care time was administered to manage and/or prevent neurologic instability.  This involved direct patient intervention, complex decision making,

## 2017-11-02 NOTE — OCCUPATIONAL THERAPY NOTE
OCCUPATIONAL THERAPY TREATMENT NOTE - INPATIENT     Room Number: 0267  Session: 1   Number of Visits to Meet Established Goals: 6    Presenting Problem: Generalized weakness; pt with hx of multiple myeloma    History related to current admission: Pt is 67 appetite    Anemia    Hypokalemia    Acute pain of both shoulders    Ileus (HCC)    Multiple myeloma not having achieved remission (HCC)    Weakness of upper extremity    Contracture of muscle, left hand    Spinal cord compression (HCC)    Cord compression once.\"    Patient self-stated goal is to go home    OBJECTIVE  Precautions: Cervical brace;Spine;Drain(s)    WEIGHT BEARING RESTRICTION  Weight Bearing Restriction: None                PAIN ASSESSMENT  Rating: 10  Location: Global pain  Management [employer] decreased balance, strength, ROM and functional mobility. These deficits continue manifest functionally while performing dressing, bathing, toileting, instrumental activities of daily living, rest and sleep, leisure and social participation.    The patient

## 2017-11-02 NOTE — PROGRESS NOTES
Pt received to unit this evening. Pain controlled on iv pain meds. ivf infusing. Vss. Pt stating he has numbness/tingling in bue hands has been the case since surgery 3 days ago. dtv by 10pm tonight, condom catheter in place.  bm the 26th, bs active, pt fam

## 2017-11-02 NOTE — PROGRESS NOTES
[facility] Progress Note      Patient Name: Javy Son   YOB: 1950  Medical Record Number: [id number]  Attending Physician: Rosales Barba M.D.       SUBJECTIVE:  Adolph Hernandez is a(n) 69year old male with nons Neutrophil Absolute Prelim 4.58 1.30 - 6.70 x10 (3) uL   Neutrophil Absolute 4.58 1.30 - 6.70 x10(3) uL   Lymphocyte Absolute 0.18 (L) 0.90 - 4.00 x10(3) uL   Monocyte Absolute 0.39 0.10 - 0.60 x10(3) uL   Eosinophil Absolute 0.00 0.00 - 0.30 x10(3) uL Blood Product [id number]    Unit Number [id number]    UNIT ABO O    UNIT RH POS    Product Status Presumed Transfused    Expiration Date [id number]    Blood Type Barcode 0914    -COMP METABOLIC PANEL (14)   Collection Time: 11/02/17  3:58 AM   Resul Granulocyte % 0.5 %   -POCT GLUCOSE   Collection Time: 11/02/17  6:27 AM   Result Value Ref Range   POC Glucose 97 65 - 99 mg/dL       Medications    sodium phosphate 15 mmol in sodium chloride 0.9 % 100 mL IVPB 15 mmol Intravenous Once   dextrose 5 % 100 rectal suppository 10 mg 10 mg Rectal Daily PRN   FLEET ENEMA (FLEET) 7-19 GM/118ML enema 133 mL 1 enema Rectal Once PRN   [] ondansetron HCl (ZOFRAN) injection 4 mg 4 mg Intravenous Q4H PRN   Metoclopramide HCl (REGLAN) injection 10 mg 10 mg Nesha Sabillon [COMPLETED] dexamethasone (DECADRON) tab 8 mg 8 mg Oral 2 times per day   Methylnaltrexone Bromide (RELISTOR) injection 12 mg 12 mg Subcutaneous Q48H   [COMPLETED] 0.9%  NaCl infusion  Intravenous Once   [COMPLETED] Potassium Chloride ER (K-DUR M20) CR t

## 2017-11-02 NOTE — CM/SW NOTE
F/U with pt and wife at bedside. Pt/Ot still recommending [facility]. Wife really wants [facility]. Explained acute v/s [facility]. Will see if [facility] can take in [facility]avier. Back up plan would be [facility] or [facility]. Referral sent  Informed wife if there is another place to let us know.  Vamshi Mittal

## 2017-11-02 NOTE — SLP NOTE
SPEECH DAILY NOTE - INPATIENT    ASSESSMENT & PLAN   ASSESSMENT  Pt seen for dysphagia tx to assess tolerance with recommended diet, ensure proper utilization of aspiration precautions and provide pt/family education.  Contacted patient at bedside with pavel (clinical evaluation) including Slow rate, Pinched straw, Upright 90 degrees, Feed patient with max assistance 90 % of the time across 2 sessions.  MET       FOLLOW UP  Follow Up Needed: No  SLP Follow-up Date: 11/02/17  Number of Visits to Meet Established

## 2017-11-02 NOTE — PLAN OF CARE
Impaired Activities of Daily Living    • Achieve highest/safest level of independence in self care Progressing        Impaired Swallowing    • Minimize aspiration risk Progressing        NEUROLOGICAL - ADULT    • Achieves stable or improved neurological st

## 2017-11-02 NOTE — PHYSICAL THERAPY NOTE
PHYSICAL THERAPY TREATMENT NOTE - INPATIENT    Room Number: 5324/6001-A     Session: 1  Number of Visits to Meet Established Goals: 5    Presenting Problem: s/p C2-C7 posterior cervical decompression 10/31/17      History related to current admission: Pt loss      Past Medical History  Past Medical History:   Diagnosis Date   • Allergic rhinitis    • Anemia associated with chemotherapy 11/12/2015   • Back problem    • Basal cell carcinoma of skin of other and unspecified parts of face 4/3/2012   • Basal ce Poor +  Dynamic Sitting: Poor           Static Standing: Dependent  Dynamic Standing: Not tested    ACTIVITY TOLERANCE  O2 Saturation: 96%  Room air  No shortness of breath    AM-PAC '6-Clicks' INPATIENT SHORT FORM - BASIC MOBILITY  How much difficulty maynard EXERCISES  Lower Extremity Ankle pumps  LAQ     Upper Extremity      Position Sitting     Repetitions   10   Sets   1     Patient End of Session: In bed;Needs met;Call light within reach;RN aware of session/findings; All patient questions and concerns addre assistance level: moderate assistance   Goal #4 Patient will recall 3/3 spinal precautions independently.     Goal #5     Goal #6     Goal Comments: Goals established on 11/1/2017; goals in progress 11/2/2017

## 2017-11-03 NOTE — PLAN OF CARE
GASTROINTESTINAL - ADULT    • Maintains or returns to baseline bowel function Progressing    • Maintains adequate nutritional intake (undernourished) Progressing        HEMATOLOGIC - ADULT    • Maintains hematologic stability Progressing        Impaired Ac feed d/t limited fine motor. Family at bedside and assisting with these things. Updated on POC. Attempted to replace K this morning but pt declined d/t hx of stomach pain with med. Dr. Carly Farley aware. Will continue to monitor.

## 2017-11-03 NOTE — PROGRESS NOTES
[facility]  Progress Note     Yusra Barrientos Patient Status:  Inpatient    1950 MRN [id number]   [facility] 6NE-A Attending Estiven Senior DO   Hosp Day # 4 PCP Bhavik Knight MD     Chief Complaint: Shoulder pain/weaknes hours. No results for input(s): TROP, CK in the last 72 hours. Imaging: Imaging data reviewed in Epic.     Medications:   • Potassium Chloride ER  40 mEq Oral Q4H   • dexamethasone Sodium Phosphate  2 mg Intravenous Daily   • Senna  17.2 mg Oral

## 2017-11-03 NOTE — CM/SW NOTE
Spoke with Greta Peña, liaison with . She indicates that pt's care needs are too great for the  [facility] unit but pt may qualify for acute rehab stay. Joey Jauregui will eval today.

## 2017-11-03 NOTE — PROGRESS NOTES
[facility]  Progress Note    Jolly Ramachandran Patient Status:  Inpatient    1950 MRN [id number]   [facility] 6NE-A Attending Danny Hillman MD   [facility] Day # 4 PCP Lamonte Quiñones MD     Subjective:   Jolly Ramachandran is a(n) 67 year MCV  89.6  90.3  90.5   MCH  30.4  30.6  30.6   MCHC  33.9  33.9  33.9   RDW  17.1*  17.3*  16.8*   NEPRELIM  5.00  5.80  6.49   WBC  5.6  6.4  7.3   PLT  83.0*  99.0*  105.0*     Recent Labs   Lab  11/01/17   0457  11/01/17   1813  11/02/17   0358  11/0

## 2017-11-03 NOTE — PROGRESS NOTES
[id number] [facility] Progress Note    Elver Garrett Patient Status:  Inpatient    1950 MRN [id number]   [facility] 6NE-A Attending Harsha Farmer, DO   Hosp Day # 4 PCP Nelly Harper MD     Subjective:   Henry Kaur Lesion  4. Metastatic Multiple Myelopma    Plan:  1. Neuro checks Q4 hours  2. Keep MAP >90 for 48 hours post-op  3. Decadron weaning - last dose tomorrow morning  4. Cervical Collar on at all times  5. Pulm and Oncology following  6. DVT Prophylaxis   7.

## 2017-11-03 NOTE — PHYSICAL THERAPY NOTE
PHYSICAL THERAPY TREATMENT NOTE - INPATIENT    Room Number: 381/381-A     Session: 2/5   Number of Visits to Meet Established Goals: 3    Presenting Problem: s/p C2-C7 posterior cervical decompression 10/31/17    Problem List  Principal Problem:    Portia Siddiqi Surgical History:  No date: ADENOIDECTOMY  No date: APPENDECTOMY  8/17/15: BONE MARROW ASPIRATE &BIOPSY  9/22/2017: COLONOSCOPY N/A      Comment: Procedure: COLONOSCOPY;  Surgeon: Court Villanueva DO;  Location: [address] [place] [address]  No date: PORT, with TA. Pt able to sit EOB x 2.5 min with assist varying from min to max for erect posture and midline orientation. Pt stated the pain was too much and he needed to return to supine.  Pt encouraged to remain erect and attempt transfer to chair, but trunk c

## 2017-11-03 NOTE — PLAN OF CARE
Pt having episodes of incontinence throughout shift, but is voiding well. No issues. Still no BM, passing large amounts of flatus. Offered suppository, pt would like to wait at this time.

## 2017-11-03 NOTE — CM/SW NOTE
ARTURO intern met with pt, his wife and dil at bedside. ARTURO informed them that pt is recommended for acute rehab and that [facility] has accepted him. Pt's wife stated that she was glad and that she wanted him at [facility].   Vandana Langford, 11/03/17, 2:30 PM

## 2017-11-03 NOTE — PROGRESS NOTES
[facility]    Neurocritical care Progress Note           Howard Perry Patient Status:  Inpatient    1950 MRN [id number]   [facility] 6NE-A Attending Eli Linares MD   [facility] Day # 2 PCP Francis Madrid MD      Subjective:  H Senna (SENOKOT) tab TABS 17.2 mg 17.2 mg Oral Nightly   docusate sodium (COLACE) cap 100 mg 100 mg Oral BID   PEG 3350 (MIRALAX) powder packet 17 g 17 g Oral Daily PRN   magnesium hydroxide (MILK OF MAGNESIA) 400 MG/5ML suspension 30 mL 30 mL Oral Daily 11/02/2017   HCT 25.1 11/02/2017   PLT 99.0 11/02/2017   CREATSERUM 0.65 11/02/2017   BUN 10 11/02/2017    11/02/2017   K 4.8 11/02/2017   K 4.8 11/02/2017    11/02/2017   CO2 25.0 11/02/2017   GLU 90 11/02/2017   CA 7.1 11/02/2017   ALB 2.6 11 appropriate  F: Spoke with family at bedside and gave them an update.      Goals of the Day: pain control, ok to transfer to floor     A total of 35 minutes of critical care time was administered to manage and/or prevent neurologic instability.  This involv

## 2017-11-03 NOTE — PLAN OF CARE
Dr. Gregory Mccracken paged regarding potassium order for replacement. Pt states that his primary took him off potassium since he was having severe stomach pains when taking this med. Will ask if she still wants it replaced.

## 2017-11-03 NOTE — OCCUPATIONAL THERAPY NOTE
OCCUPATIONAL THERAPY TREATMENT NOTE - INPATIENT     Room Number: 381/381-A   Session: 2   Number of Visits to Meet Established Goals: 6    Presenting Problem: Generalized weakness; pt with hx of multiple myeloma    History related to current admission: Pt disease    Poor appetite    Anemia    Hypokalemia    Acute pain of both shoulders    Ileus (HCC)    Multiple myeloma not having achieved remission (HCC)    Weakness of upper extremity    Contracture of muscle, left hand    Spinal cord compression ([address] understand - when I say I need to lay back down, I mean it, and I mean right away. I try as hard as I can, but I know my limits. \"    Patient self-stated goal is to go home    OBJECTIVE  Precautions: Cervical brace;Spine;Drain(s)    WEIGHT BEARING RESTRICT via logroll with total assist x2 > scooting to head of bed via total assist x2. Patient and spouse at bedside also reinforced on safety, fall prevention, OT role, care plan and importance of remaining as involved in his own self-care as possible.  Patient v 5x/week      OT Goals:   ADL Goals   Patient will perform eating: with min assist --> in progress  Patient will perform grooming: with min assist  --> in progress  Patient will perform upper body dressing:  with mod assist --> in progress  Patient will per

## 2017-11-03 NOTE — PLAN OF CARE
Patient/Family Goals    • Patient/Family Short Term Goal Completed          GASTROINTESTINAL - ADULT    • Maintains or returns to baseline bowel function Progressing    • Maintains adequate nutritional intake (undernourished) Progressing        HEMATOLOGIC

## 2017-11-03 NOTE — PROGRESS NOTES
[facility] Progress Note      Patient Name: Dariel García   YOB: 1950  Medical Record Number: [id number]  Attending Physician: Liane Leyva.  Corky Onofre M.D.       SUBJECTIVE:  Ralph Pendleton is a(n) 69year old male with nons 1.30 - 6.70 x10 (3) uL   Neutrophil Absolute 5.80 1.30 - 6.70 x10(3) uL   Lymphocyte Absolute 0.21 (L) 0.90 - 4.00 x10(3) uL   Monocyte Absolute 0.36 0.10 - 0.60 x10(3) uL   Eosinophil Absolute 0.00 0.00 - 0.30 x10(3) uL   Basophil Absolute 0.00 0.00 - 0.1 x10(3) uL   Neutrophil % 89.3 %   Lymphocyte % 2.9 %   Monocyte % 6.9 %   Eosinophil % 0.3 %   Basophil % 0.0 %   Immature Granulocyte % 0.6 %   -POCT GLUCOSE   Collection Time: 11/03/17  8:17 AM   Result Value Ref Range   POC Glucose 91 65 - 99 mg/dL (SENOKOT) tab TABS 17.2 mg 17.2 mg Oral Nightly   docusate sodium (COLACE) cap 100 mg 100 mg Oral BID   PEG 3350 (MIRALAX) powder packet 17 g 17 g Oral Daily PRN   magnesium hydroxide (MILK OF MAGNESIA) 400 MG/5ML suspension 30 mL 30 mL Oral Daily PRN   bi injection 15 mL 15 mL Intravenous ONCE PRN   [] phenylephrine in NaCl (MARIA INES-SYNEPHRINE) 50 mg/250 ml premix infusion SOLN      HYDROmorphone HCl (DILAUDID) tab 8 mg 8 mg Oral Q3H PRN   [COMPLETED] potassium chloride IVPB premix 20 mEq 20 mEq Intraven

## 2017-11-03 NOTE — PROGRESS NOTES
Provided swallowing precautions and guidebook. Reviewed prevention of constipation side effect  from narcotics. Teachback done again on ankle pumps and incentive spriometry use. Solicited and answered any questions pt had about care.  Pt verbalized [place]

## 2017-11-03 NOTE — CONSULTS
.[address] [place] [address] Dr Patient Status:  Inpatient    1950 MRN [id number]   [facility] 3SW-A Attending Kaya Tucker MD   [facility] Day # 4 PCP Jacqueline Patterson MD     Patient Identification  Ralph Pendleton is a 69year old ma two, three, four, five, six, and seven for epidural tumor with cord compression, by . Recovering in the ICU. Indwelling Sexton. Has pot-op anemia and Thrombocytopenia. No acute treatment of Multiple Myeloma until after rehab.     Post-op pain 1 N/A      Comment: Procedure: COLONOSCOPY;  Surgeon: Ziyad Dutton DO;  Location:  ENDOSCOPY  No date: PORT, INDWELLING, IMP      Comment: left  No date: TONSILLECTOMY      Potassium Chloride ER (K-DUR M20) CR tab 40 mEq 40 mEq Oral Q4H (COLACE) cap 100 mg 100 mg Oral BID   PEG 3350 (MIRALAX) powder packet 17 g 17 g Oral Daily PRN   magnesium hydroxide (MILK OF MAGNESIA) 400 MG/5ML suspension 30 mL 30 mL Oral Daily PRN   bisacodyl (DULCOLAX) rectal suppository 10 mg 10 mg Rectal Daily PRN phenylephrine in NaCl (MARIA INES-SYNEPHRINE) 50 mg/250 ml premix infusion SOLN      HYDROmorphone HCl (DILAUDID) tab 8 mg 8 mg Oral Q3H PRN   [COMPLETED] potassium chloride IVPB premix 20 mEq 20 mEq Intravenous Once   [COMPLETED] dexamethasone (DECADRON) tab 8 m 12, height 5' 6\" (1.676 m), weight 121 lb 4.1 oz (55 kg), SpO2 100 %.     Intake/Output Summary (Last 24 hours) at 11/03/17 1321  Last data filed at 11/03/17 1045   Gross per 24 hour   Intake             2544 ml   Output             1850 ml   Net ASSESSMENT:  Impaired mobility and self-care secondary to Cervical myelopathy and s/p Cervical spinal C2-7 fusion and resection epidural tumor. Incomplete Quadriparesis and neurogenic bladder.      Multiple Myeloma    PLAN:

## 2017-11-04 NOTE — PHYSICAL THERAPY NOTE
PHYSICAL THERAPY TREATMENT NOTE - INPATIENT    Room Number: 381/381-A     Session: 3/5   Number of Visits to Meet Established Goals: 2    Presenting Problem: s/p C2-C7 posterior cervical decompression 10/31/17    Problem List  Principal Problem:    Mehrdad Nascimento Surgical History:  No date: ADENOIDECTOMY  No date: APPENDECTOMY  8/17/15: BONE MARROW ASPIRATE &BIOPSY  9/22/2017: COLONOSCOPY N/A      Comment: Procedure: COLONOSCOPY;  Surgeon: Brian Davis DO;  Location: [facility] ENDOSCOPY  No date: PORT, BM. Pt assisted to log roll to the R and supine to sit with max A. Pt sitting EOB x 1 min to prepare for transfer to bedside commode with min/mod A. Pt transferred to bedside commode with TA.  Pt sitting on commode x 8 min with varying assist from min to ma goals ongoing 11/3/17, ongoing 11/4/17

## 2017-11-04 NOTE — PROGRESS NOTES
[facility] Progress Note      Patient Name: Sudheer Alva   YOB: 1950  Medical Record Number: [id number]  Attending Physician: Branda Kanner.  Osmar Park M.D.       SUBJECTIVE:  Minda Harada is a(n) 69year old male with nons Ref Range   WBC 6.5 4.0 - 13.0 x10(3) uL   RBC 3.03 (L) 3.80 - 5.80 x10(6)uL   HGB 9.2 (L) 13.0 - 17.0 g/dL   HCT 27.4 (L) 37.0 - 53.0 %   .0 (L) 150.0 - 450.0 10(3)uL   MCV 90.4 80.0 - 99.0 fL   MCH 30.4 27.0 - 33.2 pg   MCHC 33.6 31.0 - 37.0 g/dL Intravenous Q8H   Followed by      [COMPLETED] dexamethasone Sodium Phosphate (DECADRON) 4 MG/ML injection 2 mg 2 mg Intravenous Q12H   Followed by      dexamethasone Sodium Phosphate (DECADRON) 4 MG/ML injection 2 mg 2 mg Intravenous Daily   Labetalol HCl acetaminophen (OFIRMEV) infusion 1,000 mg 1,000 mg Intravenous Q6H   Orphenadrine Citrate (NORFLEX) injection 30 mg 30 mg Intravenous Q6H   Pantoprazole Sodium (PROTONIX) 40 mg in Sodium Chloride 0.9 % 10 mL IV push 40 mg Intravenous Q24H   Insulin Aspar Assessment and Plan  1. Cervical cord compression: s/p surgery. Continued management as per neurosurgery including steroid taper. Eventual RT to the area planned for myelomatous involvement. Has been accepted at [facility] for rehab    2.    Multiple my

## 2017-11-04 NOTE — PROGRESS NOTES
[facility] HOSPITALIST  Progress Note     Johanna Florez Patient Status:  Inpatient    1950 MRN [id number]   [facility] 6NE-A Attending Franck Sosa, DO   Hosp Day # 5 PCP Lianne Senior MD     Chief Complaint: Shoulder pain/weaknes hours. No results for input(s): TROP, CK in the last 72 hours. Imaging: Imaging data reviewed in Epic.     Medications:   • Potassium Chloride ER  40 mEq Oral Once   • Heparin Sodium (Porcine)  5,000 Units Subcutaneous Q8H [address]   • Senna  17.2 mg

## 2017-11-04 NOTE — OCCUPATIONAL THERAPY NOTE
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 The patient is below baseline and would benefit from skilled inpatient OT to address the above deficits, maximizing patient's ability to return to prior level of function.      Due to patient's functional

## 2017-11-05 NOTE — PROGRESS NOTES
INPATIENT PROGRESS NOTE    Assessment:   Candelario Bell in room 381/381-A, is a 69year old male, Hospital Day ( LOS: 5 days ) hospitalized for Weakness generalized.     Post-Op Day 5 Days Post-Op s/p Procedure(s):  POSTERIOR CERVICAL DECOMPRESSION 1 LEVEL Clinical exam:  Alert and oriented to person place time and event. No apparent distress. 2 through 12 intact. Sensory intact light touch throughout. Upper extremities show strength 3-4+ throughout. Lower extremity show 2-4- throughout.     Labs Rev

## 2017-11-05 NOTE — PROGRESS NOTES
[facility] Progress Note      Patient Name: Zayda Joyner   YOB: 1950  Medical Record Number: [id number]  Attending Physician: Armando Garcia.  Dennys Paniagua M.D.       SUBJECTIVE:  Candelario Bell is a(n) 69year old male with nons Potassium 3.1 (L) 3.6 - 5.1 mmol/L       Medications    metRONIDAZOLE (FLAGYL) tab 500 mg 500 mg Oral Q8H [address]   potassium chloride IVPB premix 40 mEq 40 mEq Intravenous Once   Followed by      potassium chloride IVPB premix 20 mEq 20 mEq Intravenous Once [] Atropine Sulfate 0.1 MG/ML injection 0.5 mg 0.5 mg Intravenous PRN   [] Naloxone HCl (NARCAN) 0.4 MG/ML injection 80 mcg 80 mcg Intravenous PRN   [] fentaNYL citrate (SUBLIMAZE) 0.05 MG/ML injection 25 mcg 25 mcg Intravenous Q5 Mi 2.5 % rectal cream  Rectal BID   [COMPLETED] dexamethasone Sodium Phosphate (DECADRON) 4 MG/ML injection 10 mg 10 mg Intravenous Once   [COMPLETED] iohexol (OMNIPAQUE) 350 MG/ML injection 75 mL 75 mL Intravenous ONCE PRN   [COMPLETED] Gadoterate Meglumine treatment and has been refractory to last 4 lines. Will defer to his primary oncologist but I think hospice is not unreasonable. 3.   Prophylaxis: SCD boots. Heparin or Lovenox when deemed safe by neurosurgery. 4.   Anemia: Hgb up to 9.2     5.    Alicia Chiu

## 2017-11-05 NOTE — PROGRESS NOTES
INPATIENT PROGRESS NOTE    Assessment:   Johanna Florez in room 381/381-A, is a 69year old male, Hospital Day ( LOS: 6 days ) hospitalized for Weakness generalized.     Post-Op Day 6 Days Post-Op s/p Procedure(s):  POSTERIOR CERVICAL DECOMPRESSION 1 LEVEL place time and event. No apparent distress. Collar in place  Cn 2 through 12 intact. Sensory intact light touch throughout. Upper extremities show strength 3-4+ throughout. Lower extremity show 2-4- throughout. Labs Reviewed: yes    Nupur Alarcon.  Case

## 2017-11-05 NOTE — OCCUPATIONAL THERAPY NOTE
Attempted to see pt for OT treatment session. Per RN, pt with GI discomfort. Pt declined therapy stating he was not feeling well enough to participate. OT to f/u tomorrow.

## 2017-11-05 NOTE — PHYSICAL THERAPY NOTE
Attempted to see pt for PT, pt declined participation today. Per nursing, pt has been having frequent diarrhea with positive C-diff and is very weak today. Will attempt to resume PT tomorrow if appropriate.

## 2017-11-05 NOTE — PROGRESS NOTES
[facility]PITAL[facility]  Progress Note     Chares Pass Patient Status:  Inpatient    1950 MRN [id number]   [facility] 6NE-A Attending Thiago Knox, DO   Hosp Day # 6 PCP Toni Soto MD     Chief Complaint: Shoulder pain/weaknes Followed by   • potassium chloride  20 mEq Intravenous Once   • Heparin Sodium (Porcine)  5,000 Units Subcutaneous Q8H [address]   • Senna  17.2 mg Oral Nightly   • docusate sodium  100 mg Oral BID   • Orphenadrine Citrate  30 mg Intravenous Q6H   • pantoprazole

## 2017-11-05 NOTE — PLAN OF CARE
GASTROINTESTINAL - ADULT    • Maintains or returns to baseline bowel function Not Progressing    • Maintains adequate nutritional intake (undernourished) Not Progressing          HEMATOLOGIC - ADULT    • Maintains hematologic stability Progressing        I

## 2017-11-06 PROBLEM — Z71.89 GOALS OF CARE, COUNSELING/DISCUSSION: Status: ACTIVE | Noted: 2017-01-01

## 2017-11-06 NOTE — PROGRESS NOTES
11/06/17 1727   Clinical Encounter Type   Visited With Patient   Routine Visit Introduction   Continue Visiting No   Patient Spiritual Encounters   Spiritual Interventions  provided blessing for patient

## 2017-11-06 NOTE — CONSULTS
[address] [place] [address] Rd  [id number]  Hospital Day # 7  Date of Consult:  11/6/2017    Reason for Consultation:  Consult requested by [facility] for evaluation of palliative care needs and goals of care.     History of Present Illne ADENOIDECTOMY  No date: APPENDECTOMY  8/17/15: BONE MARROW ASPIRATE &BIOPSY  9/22/2017: COLONOSCOPY N/A      Comment: Procedure: COLONOSCOPY;  Surgeon: Sivan Porter DO;  Location: [facility] ENDOSCOPY  No date: PORT, INDWELLING, IMP      Commen [phone]  11/6/2017  1:01 PM

## 2017-11-06 NOTE — CM/SW NOTE
Hannah from Palliative care called unit SW to say pt and family would like referral made to [facility]' inpatient unit for pain management. Referral was made via [facility].  I spoke with Rafael Hursts at [facility] who states she has spoken with pts wife Dejah Derrick and is tryi

## 2017-11-06 NOTE — PHYSICAL THERAPY NOTE
Patient and family awaiting to meet w/ Palliative care to discuss possible hospice. Will hold therapy at this time until patient makes decision. Nursing is aware.

## 2017-11-06 NOTE — CM/SW NOTE
Call from Lady Lucas with [facility] that Chad Glover will be here to meet with pt/family between 4-4:30PM today. They had apparently spoken with pt's wife.

## 2017-11-06 NOTE — PROGRESS NOTES
Hematology/Oncology Progress Note    Patient Name: Candice Piper  Medical Record Number: [id number]    YOB: 1950     Reason for Consultation:  Candice Piper was seen today for the diagnosis of multiple myeloma    Interval events:  Ongoing Skin: no rashes or petechiae    Laboratory:  Recent Labs   Lab  11/04/17   0457  11/06/17   0210   WBC  6.5  7.1   HGB  9.2*  8.5*   HCT  27.4*  24.7*   PLT  110.0*  120.0*   MCV  90.4  91.5   RDW  17.0*  16.6*   NEPRELIM  5.86  6.23       Recent Labs spent 35 minutes face to face with the patient. More than 50% of that time was spent counseling the patient and family on the diagnosis, prognosis, and treatment options available and on coordination of care.        Ten Rivera MD  Hematology/[facility]

## 2017-11-06 NOTE — OCCUPATIONAL THERAPY NOTE
Patient and family awaiting to meet w/ Palliative care to discuss possible hospice. Will hold therapy at this time until patient makes decision.

## 2017-11-06 NOTE — OPERATIVE REPORT
[facility]  Operative report    Malou Silvestre Location: OR   CSN [id number] MRN [id number]   Admission Date 10/28/2017 Operation Date 10/31/2017   Attending Physician Terrell Jimenes MD Operating Physician Clotilde Sapp., DO     Pre-Operative Denece Arcelia neurosurgery at 7 PM stating that the patient has become profoundly weak and quadriplegic. Stat CT of the cervical spine stat MRI with general anesthesia was ordered.   I spoke with the patient and the patient's family and discussed the grave nature of his Careful Bovie electrocautery was used to find an avascular plane along the nuchal ligament. This was carried out to the spinous processes. Self-retaining cerebellar retractors were placed.   The cervical fascial layer was dissected using Bovie electrocau thinned as well. The inferior lamina of cervical tube was thinned as well. Next an angled curette was used to dissect the ligamentum flavum from the underside of each of the laminas.   The intraoperative microscope was brought in for direct illumination a to the incision. The Walter's fascia was approximated using 2-0 Vicryl suture in inverted interrupted fashion. The subcuticular layers were approximated using 2-0 Vicryl suture in an inverted interrupted fashion.   The skin was further approximated using

## 2017-11-06 NOTE — DIETARY NOTE
Nutrition Short Note   In Patient Comfort Care OS noted with pleasure feeds. Nutrition services will sign off. RD available as needed.     Bing Garcia RD, LDN  Pager [phone]

## 2017-11-06 NOTE — PROGRESS NOTES
[facility]  Progress Note     Minda Harada Patient Status:  Inpatient    1950 MRN [id number]   [facility] 6NE-A Attending Lukas Arevalo DO   Hosp Day # 7 PCP Michiel Ormond, MD     Chief Complaint: Shoulder pain/weaknes Senna  17.2 mg Oral Nightly   • docusate sodium  100 mg Oral BID   • Orphenadrine Citrate  30 mg Intravenous Q6H   • pantoprazole (PROTONIX) IV push  40 mg Intravenous Q24H   • Insulin Aspart Pen  1-5 Units Subcutaneous TID CC and HS   • fentaNYL  1 patch

## 2017-11-07 NOTE — PROGRESS NOTES
Hematology/Oncology Progress Note    Patient Name: Ivania Mensah  Medical Record Number: [id number]    YOB: 1950     Reason for Consultation:  Ivania Mensah was seen today for the diagnosis of multiple myeloma    Interval events:  Family lb)      Physical Examination:  General: lethargic, aroused by voice, but appears uncomfortable when awake, but falls back asleep once settles down.    Heart: RRR, no murmurs  Lungs: CTAB  Neuro: moving all 4 extremities  Skin: no rashes or petechiae    Lab

## 2017-11-07 NOTE — PLAN OF CARE
Dr. Tnoya Mejía paged for D/c order. [facility] here to coordinate transfer to their unit. No scripts needed and want to leave port accessed for facility. [facility] will set up ambulance transfer and time when D/c order received.

## 2017-11-07 NOTE — PLAN OF CARE
GASTROINTESTINAL - ADULT    • Maintains or returns to baseline bowel function Adequate for Discharge    • Maintains adequate nutritional intake (undernourished) Adequate for Discharge        HEMATOLOGIC - ADULT    • Maintains hematologic stability Adequate

## 2017-11-07 NOTE — CM/SW NOTE
11/07/17 1200   Discharge disposition   Discharged to: [facility]   Name of [address]   Discharge transportation Other (comment)  (ambulance)   Informed by liaison with [facility] that she arranged for transportatio

## 2017-11-07 NOTE — HOSPICE RN NOTE
Pt will transfer to [facility] hospice inpt at [facility] and son aware. Thank you!  Shilpi Christie

## 2017-11-07 NOTE — PROGRESS NOTES
[facility] Note: Route Optimization for Pantoprazole (PROTONIX)    Patient is currently on Pantoprazole (PROTONIX) 40 mg IV every 24 hours.    The patient meets the criteria to convert to the oral equivalent as established by the IV to Oral conversion pro

## 2017-11-07 NOTE — PLAN OF CARE
D/c paperwork given to paramedics for transfer via ambulance to [facility] hospice. Pt given dil 2mg IV and norflex for transfer.

## 2017-11-07 NOTE — PROGRESS NOTES
Veronica Fitzgerald 1945 Follow Up    Pt seen and evaluated, wife and son at bedside. Patient states he is more comfortable today with increased fentanyl patch. He is alert.   He is waiting for transport to [facility]'s inpatient this am.        Assessment/R

## 2017-11-07 NOTE — TELEPHONE ENCOUNTER
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

## 2017-11-10 NOTE — PROGRESS NOTES
[facility]    PATIENT:  Laura Amaro MD: Dr. Stewart Given    DIAGNOSIS:  Multiple myeloma    CANCER HISTORY:  58-year-old man with history of multiple myeloma status post numerous courses of sys

## 2017-11-12 NOTE — DISCHARGE SUMMARY
[facility] HOSPITALIST  DISCHARGE SUMMARY     Candice Piper Patient Status:  Inpatient    1950 MRN [id number]   [facility] 3SW-A Attending No att. providers found   Hosp Day # 8 PCP Roseanne Hoffman MD     Date of Admission: 10/28/2017 pathological fracture of C3. He was seen by neurosurgery, neurology, pulm cc, and oncology. He was admitted to CNICU and eventually underwent cervical decompression per neurosurgery.   His symptoms mildly improved but he was still in a significant amount Follow-up appointment:   Max Jackson 15  [phone]    On 11/15/2017  Appt time 2:00  --------------------------------------------  PATIENT DISCHARGE INSTRUCTIONS: See electronic chart    [place]

## 2017-11-13 ENCOUNTER — TELEPHONE (OUTPATIENT)
Dept: FAMILY MEDICINE CLINIC | Facility: CLINIC | Age: 67
End: 2017-11-13

## 2018-09-29 NOTE — PROGRESS NOTES
[facility] Medicine Progress Note    Primary Team: [facility] Hospitalist Team A  Attending Physician: Annette Swain MD  Resident: Sonny  Intern: Beatriz    Subjective:      Pt reports feeling well this morning other than the same indigestion she's been feeling throughout her stay, which is relieved by the famotidine and worsened when she eats. Patient denies any flank pain this morning, and continues to urinate without difficulty although she has noticed a slight red tinge to it which has changed from the previous brown tinge to the urine. Denies CP, SOB, headache, abdominal pain, N/V.       Objective:     Last 24 Hour Vital Signs:  BP  Min: 94/68  Max: 145/103  Temp  Av.5 °F (36.4 °C)  Min: 96.1 °F (35.6 °C)  Max: 98.9 °F (37.2 °C)  Pulse  Av  Min: 88  Max: 103  Resp  Av.2  Min: 14  Max: 20  SpO2  Av.5 %  Min: 94 %  Max: 98 %  I/O last 3 completed shifts:  In: 2538.3 [I.V.:2138.3; IV Piggyback:400]  Out: 2685 [Urine:2685]    Physical Examination:  Physical Exam   Constitutional: AOx3. NAD, Poor hygiene   HENT: NC/AT; EOMI, no scleral icterus; MMM, no oral erythema or exudates, poor dentition  Neck: Normal range of motion. Neck supple. No tracheal deviation present.   Cardiovascular: Normal rate, regular rhythm and intact distal pulses. +3/6 midsystolic murmur appreciated in 2nd intercostal space of RSB and LSB  Pulmonary/Chest: Effort normal. No respiratory distress. She has no wheezes. CTAB  Abdominal: Soft. She exhibits no distension. There is no tenderness. There is no rebound and no guarding. No CVA tenderness   Musculoskeletal: Normal range of motion. Moves all 4 ext equally.  Lymphadenopathy: no cervical adenopathy.   Neurological: She is alert and oriented to person, place, and time. Coordination normal.   Skin: Skin is warm and dry. No rash noted. She is not diaphoretic. No erythema.      Laboratory:  Laboratory Data Reviewed: yes  Pertinent Findings:  Lab Results   Component Value Date  Met with pt & wife to offer support. Pt stated \"things have changed\" since the last time he saw Marni Cousins. Pt was recently hospitalized x4 days & learned that previous chemo was no longer working. Today pt is receiving first treatment of a new regimen.    WBC 16.63 (H) 09/28/2018    HGB 12.1 09/28/2018    HCT 36.2 (L) 09/28/2018    MCV 87 09/28/2018     09/28/2018     Lab Results   Component Value Date    CREATININE 0.7 09/28/2018    BUN 12 09/28/2018     09/28/2018    K 4.3 09/28/2018     09/28/2018    CO2 21 (L) 09/28/2018     Lab Results   Component Value Date    ALT 19 09/28/2018    AST 16 09/28/2018    GGT 64 (H) 09/27/2018    ALKPHOS 123 09/28/2018    BILITOT 0.4 09/28/2018       Microbiology Data Reviewed: yes  Pertinent Findings:  Blood Cx 9/26: GNR  Urine Cx: E coli, susceptibility pending  Blood Cx 9/27: NGTD    Other Results:  EKG (my interpretation):  NSR    Radiology Data Reviewed: yes  Pertinent Findings:  CT Renal: 6 mm stone in UP junction    Current Medications:     Infusions:   sodium chloride 0.9% 75 mL/hr at 09/29/18 0100        Scheduled:   enoxaparin  40 mg Subcutaneous Daily    famotidine  20 mg Oral Daily    lisinopril  20 mg Oral Daily    piperacillin-tazobactam (ZOSYN) IVPB  4.5 g Intravenous Q8H    QUEtiapine  100 mg Oral QHS    tamsulosin  0.4 mg Oral Daily    venlafaxine  150 mg Oral BID        PRN:  diphenhydrAMINE, HYDROmorphone, morphine, ondansetron, ondansetron, ramelteon, sodium chloride 0.9%, sodium chloride 0.9%, traMADol    Antibiotics and Day Number of Therapy:  Zosyn 9/26-present    Lines and Day Number of Therapy:  PIV 9/26-present    Assessment:     Viv Srivastava is a 71 y.o.female with  Patient Active Problem List    Diagnosis Date Noted    Hypertension 09/28/2018    Gram-negative bacteremia 09/28/2018    Iron deficiency anemia 09/27/2018    Hiatal hernia 09/27/2018    Severe sepsis with acute organ dysfunction 09/26/2018    Hydronephrosis, right 09/26/2018    E-coli UTI 09/26/2018    Obstruction of right ureteropelvic junction (UPJ) due to stone 09/26/2018    Depression 09/26/2018    YESSICA (acute kidney injury) 09/26/2018    Sepsis associated hypotension 09/26/2018    Severe  sepsis 09/26/2018    Aortic stenosis 09/26/2018    Closed fracture of proximal end of left humerus 04/04/2016        Plan:     Severe Sepsis 2/2 to E Coli Urinary Tract Infection with E coli Bacteremia  -Patient with right flank pain X5 days, denies any urinary symptoms however UA showing 3+ leukocytes and 2+ protein and many bacteria  -qSOFA: 2/3  -Patient given boluses of total of 2.6 L NS in ER along with dose of Vancomycin and Zosyn.  -Patient initial WBC 1.6, RR 22, , BP of 59/43  -Initial lactate 4.1 ---> 1.2  -Bcx 9/26 growing E coli, pan-sensitive  Ucx 9/26 growing E coli, pan-sensitive  Bcx 9/27 NGTD  -Zosyn during hospital stay > can switch to po abx to go home on now that blood and urine cx both pan-sensitive E coli     Nephrolithiasis with right hydronephrosis  -Patient with right flank pain x5 days  -CT Renal shows moderate right hydronephrosis with 6 mm calcification at ureteropelvic junction  -Urology placed stent on 9/27  -For pain giving tramadol 50 mg q6h prn and morphine 2 mg IV q6h prn  -Zofran prn for nausea     YESSICA secondary to complicated E coli UTI with obstructing right UPJ stone with right hydronephrosis, resolved  -Baseline Cr 0.60 with GFR > 60 on February 2016  -Creatinine 1.04, GFR 53.4 on 9/26/18  -Prerenal vs postrenal  FeNa 1.8  -On continuous NS at 75 ml/hr  -Cr of 0.70 on 9/28, resolved     Hypertension  -Patient without history of hypertension, states last office visits have been at 120s/80s  -After fluids started BP has been elevated, have been lowering rate of IV fluid infusion  -Started Lisinopril 20 mg daily on 9/27    Iron Deficiency Anemia  -On 9/27 Hb 11/Hct 35/ MCV 88  -Likely dilutional secondary to IV fluids  -Labs consistent with JEY    Mild Aortic Stenosis  -RSB murmur on exam, patient asymptomatic  -ECHO with SKY of 1.53 cm2 in July 2017  -repeat ECHO pending    Hiatal Hernia  -Incidental finding on imaging of large hiatal hernia  -Patient at home does  not have symptoms of indigestion  -Complaining of indegestion at this time, will give to famotidine while here in hospital, follow up with PCP if long term symptoms occur     Depression  -Continue home venlafaxine 150 mg BID and Seroquel 100 mg nightly     Fluids: NS at 75 ml/hr  Electrolytes: monitor  Nutrition: Full liquid diet, regular diet for lunch  DVT PPx: Heparin     Dispo: Likely home today     Code Status:      Full      Ana Maria Reid MD  [facility] Internal Medicine HO-1    [facility] Medicine Hospitalist Pager numbers:   [facility] Hospitalist Medicine Team A (Robinson/Brynn): [phone]  [facility] Hospitalist Medicine Team B (Nicole/Eileen):  [phone]

## 2019-10-02 NOTE — PROGRESS NOTES
Prepared Long Term Disability Claim Form. E-mailed Claim Form & pertinent medical records to Amina at [facility], [address] [place] [address] [place]: [email or web address]
Adequate: hears normal conversation without difficulty

## 2020-06-29 NOTE — PROGRESS NOTES
Hematology/Oncology Clinic Follow Up Note    Patient Name: Agnieszka Murillo Record Number: [id number]    YOB: 1950   PCP: Dr. Wendel Alpers  Other providers: BONNIE Monteiro (palliative care), Dr. Brien Ghosh (psychologist), and abdominal pain which has since resolved. Neuro IR evaluated him as MRI showed endplate deformities but they felt he would not benefit from a vertebroplasty. He has started palliative RT today as above.   Pain is adequately controlled currently with MS 3   Ondansetron HCl (ZOFRAN) 8 MG tablet Take 1 tablet (8 mg total) by mouth every 8 (eight) hours as needed for Nausea.  Disp: 30 tablet Rfl: 3   SIMVASTATIN 20 MG Oral Tab TAKE 1 TABLET BY MOUTH EVERY NIGHT Disp: 90 tablet Rfl: 0   diphenoxylate-atropine Cancer Father       of Colon cancer     Review of Systems:  A 10-point ROS was done with pertinent positives and negative per the HPI    Vital Signs:  Height: 168 cm (5' 6.14\") (1033)  Weight: 65.318 kg (144 lb) (1033)  BSA (Calculated - s  06/14/2017   K 4.1 06/16/2017    06/14/2017   CO2 24.0 06/14/2017       Lab Results  Component Value Date   PTT 31.5 03/26/2017   INR 1.03 03/26/2017     Component M-Andrea   Latest Ref Rng <=0.00 g/dL   5/31/2017 Monoclonal IgG kappa on SPEP 8/8/2016 57.05 (H)   4/25/2016 1.63   11/10/2015 1.90 (H)   10/20/2015 1.91 (H)   9/28/2015 4.91 (H)   8/5/2015 65.19 (H)     Imaging:    PET/CT 6/9/17: FINDINGS:     The prior noted lesion with increased FDG uptake within the right parotid gland is agai max SUV numbers. As an example, the largest    focus along the right mid chest wall along the mid axillary line currently reveals a max SUV of 34.50 as opposed to 21.59 on the prior study.  No new rib lesions are noted.    Prior noted focus of increased upt presumably L5 with a max SUV of 16.98.        Impression & Plan:     *Multiple myeloma  -poor risk cytogenetics based on both t(4;14) and 17p deletion. +oligosecretory, therefore best followed with PET/CT.    -had CR after VRD x 4 followed by [id number] AutoSCT lesions  -ongoing palliative RT as above  -continue MS contin 60mg BID and morphine IR 15mg q3-4hrs prn  -palliative care is following- will have him see Hannah next week    *Bone-    -multiple active bone lesions on relapse  -continue zometa IV [id number]    * Normal Normal Normal Normal Normal Normal

## 2021-02-08 NOTE — PLAN OF CARE
Pt BS this morning was 68. Pt is on comfort care/pleasure feeds. Will give pt juice but has low intake at this time. Will page Dr. Kalie Gagnon if they would still like MANJULA iglesias at this time. no

## 2022-11-22 NOTE — PLAN OF CARE
Pain, Altered Comfort    • Patient will notify staff when experiencing pain and will describe location, intensity and type of pain Progressing        COMPLETED XRAY OF RIBS , RECVD RADIATION THERAPY TREATMENT, SCHEDULED AND PRN MEDS FOR PERSISTANT DISCOMFO n/a

## 2022-12-18 NOTE — PROGRESS NOTES
[facility] Progress Note      Patient Name: Sudheer Alva   YOB: 1950  Medical Record Number: [id number]  Attending Physician: Branda Kanner.  Osmar Park M.D.       SUBJECTIVE:  Minda Harada is a(n) 69year old male with mult %   .0 (L) 150.0 - 450.0 10(3)uL   MCV 86.8 80.0 - 99.0 fL   MCH 28.6 27.0 - 33.2 pg   MCHC 33.0 31.0 - 37.0 g/dL   RDW 19.1 (H) 11.5 - 16.0 %   RDW-SD 60.5 (H) 35.1 - 46.3 fL   Neutrophil Absolute Prelim 4.59 1.30 - 6.70 x10 (3) uL   Neutrophil Abs 2. 89 1.30 - 6.70 x10(3) uL   Lymphocyte Absolute 0.11 (L) 0.90 - 4.00 x10(3) uL   Monocyte Absolute 0.72 (H) 0.10 - 0.60 x10(3) uL   Eosinophil Absolute 0.07 0.00 - 0.30 x10(3) uL   Basophil Absolute 0.02 0.00 - 0.10 x10(3) uL   Immature Granulocyte Absolu COMPARISON:  JORDY , CT ABDOMEN PELVIS IV CONTRAST, NO ORAL (ER), 7/13/2017, 14:59. INDICATIONS:  KAMLA     TECHNIQUE:  Transabdominal gray scale ultrasound imaging of the bilateral kidneys and bladder was performed. Routine technique was utilized. no chest pain, no cough, and no shortness of breath.

## 2023-04-01 NOTE — PROGRESS NOTES
Pt arrived for dex infusion and zometa, pt will see BONNIE Nowak and Hannah, pt not starting chemo today, pt to start treatment on July 10th, pt returning next week for additional dex infusion, pt alert and appears in nad, pt states having increased pain to right NSD x2 full term no complications  Incomplete Ab x1 D+c  2018 Cholycystectomy   2004 breast augmentation  19 years ago abnormal pap resolved  Anxiety and depression Hx no meds or therapy currently

## 2024-10-31 NOTE — PROGRESS NOTES
Pt meeting criteria for SEVERE MALNUTRITION in chronic illness as evidenced by 15% unplanned wt loss x 3 mos and po intake meeting less than 75% estimated nutrition needs x 1 mo.       NUTRITION F/U NOTE:    DX:  multiple myeloma      TX: to discuss per MD
Admission

## 2025-01-30 NOTE — PHYSICAL THERAPY NOTE
PHYSICAL THERAPY EVALUATION - INPATIENT     Room Number: 715/104-M  Evaluation Date: 10/30/2017  Type of Evaluation: Initial  Physician Order: PT Eval and Treat    Presenting Problem: generalized weakness  Reason for Therapy: Mobility Dysfunction and D parts of face 4/3/2012    Basal Cell Carcinoma Face     • Cancer ([facility]     multiple myeloma   • Cataract     cataract surgery 2 1/2 weeks ago L eye   • Depression    • Exposure to radiation     radiation on shoulders & side in the past; supposed to start t Level:  oriented to situation and oriented to person  · Following Commands:  follows all commands and directions without difficulty  · Awareness of Deficits:  fully aware of deficits    RANGE OF MOTION AND STRENGTH ASSESSMENT  Upper extremity ROM and stren can not try standing at this time. Demo sit>supine total Ax2. Scooting to hob total Ax2. Pt left supine in bed, needs met.     Exercise/Education Provided:  Bed mobility  Body mechanics  Energy conservation  Functional activity tolerated  Posture  Transfer rehab is recommended for 12-14 days. After this period of rehabilitation patient should achieve supervision level in all functional mobility.   DISCHARGE RECOMMENDATIONS  PT Discharge Recommendations: Sub-acute rehabilitation (ELOS 12-14 days)    PLAN  PT no

## (undated) DEVICE — SUTURE VICRYL 0 CT-1

## (undated) DEVICE — CAUTERY BLADE 2IN INS E1455

## (undated) DEVICE — 3M™ RED DOT™ MONITORING ELECTRODE WITH FOAM TAPE AND STICKY GEL, 50/BAG, 20/CASE, 72/PLT 2570: Brand: RED DOT™

## (undated) DEVICE — SUPER SPONGES,MEDIUM: Brand: KERLIX

## (undated) DEVICE — FORCEP BIOPSY RJ4 LG CAP W/ND

## (undated) DEVICE — 6.0MM PRECISION ROUND

## (undated) DEVICE — DRAIN SILICONE RND 1/8

## (undated) DEVICE — 1200CC GUARDIAN II: Brand: GUARDIAN

## (undated) DEVICE — SUTURE VICRYL 2-0 CT-2

## (undated) DEVICE — PROXIMATE SKIN STAPLERS (35 WIDE) CONTAINS 35 STAINLESS STEEL STAPLES (FIXED HEAD): Brand: PROXIMATE

## (undated) DEVICE — Device

## (undated) DEVICE — DRAPE TABLE COVER 44X90 TC-10

## (undated) DEVICE — CSTM UNIVERSAL DRAPE PK: Brand: MEDLINE INDUSTRIES, INC.

## (undated) DEVICE — FILTERLINE NASAL ADULT O2/CO2

## (undated) DEVICE — VUEPOINT II DRILL BIT

## (undated) DEVICE — SNARE CAPTIFLEX MICRO-OVL OLY

## (undated) DEVICE — STERILE POLYISOPRENE POWDER-FREE SURGICAL GLOVES: Brand: PROTEXIS

## (undated) DEVICE — LAMINECTOMY CDS: Brand: MEDLINE INDUSTRIES, INC.

## (undated) DEVICE — TRAP 4 CPTR CHMBR N EZ INLN

## (undated) DEVICE — CODMAN® SURGICAL PATTIES 1/2" X1 1/2" (1.27CM X 3.81CM): Brand: CODMAN®

## (undated) DEVICE — Device: Brand: DEFENDO AIR/WATER/SUCTION AND BIOPSY VALVE

## (undated) DEVICE — #15 STERILE STAINLESS BLADE: Brand: STERILE STAINLESS BLADES

## (undated) DEVICE — SHEET, DRAPE, SPLIT, STERILE: Brand: MEDLINE

## (undated) DEVICE — ENDOSCOPY PACK - LOWER: Brand: MEDLINE INDUSTRIES, INC.

## (undated) DEVICE — GOWN,SIRUS,FABRIC-REINFORCED,X-LARGE: Brand: MEDLINE

## (undated) DEVICE — 3M(TM) STERI-STRIP(TM) ANTIMICROBIAL SKIN CLOSURES (REINFORCED) A1846: Brand: 3M™ STERI-STRIP™

## (undated) DEVICE — DRAPE MICROSCOPE NEURO PENTERO

## (undated) DEVICE — DRAIN RELIAVAC 100CC

## (undated) DEVICE — TRANSPOSAL ULTRAFLEX DUO/QUAD ULTRA CART MANIFOLD

## (undated) DEVICE — 3M(TM) MEDIPORE(TM) +PAD SOFT CLOTH ADHESIVE WOUND DRESSING 3569: Brand: 3M™ MEDIPORE™

## (undated) DEVICE — MAYFIELD® DISPOSABLE ADULT SKULL PIN (PLASTIC BASE): Brand: MAYFIELD®

## (undated) DEVICE — LIGHT HANDLE

## (undated) DEVICE — BANDAID COVERLET 1X3

## (undated) DEVICE — CODMAN® SURGICAL PATTIES 1" X 3" (2.54CM X 7.62CM): Brand: CODMAN®

## (undated) DEVICE — FLUIDGARD® 160 ANTI-FOG SURGICAL MASK WITH ANTI-GLARE SHIELD: Brand: PRECEPT ®

## (undated) DEVICE — FLOSEAL SEALENT STERILE 10ML

## (undated) DEVICE — SOL  .9 1000ML BTL

## (undated) DEVICE — DRILL SRG OIL CRTDG MAESTRO

## (undated) DEVICE — TAPE CLOTH ADHESIVE 3

## (undated) DEVICE — ENDOSCOPY PACK UPPER: Brand: MEDLINE INDUSTRIES, INC.

## (undated) NOTE — MR AVS SNAPSHOT
After Visit Summary   3/16/2017    Adolph Hernandez    MRN: [id number]           Diagnoses this Visit     Multiple myeloma, remission status unspecified ([facility]    -  Primary     Multiple myeloma in relapse ([facility]           Allergies     No Known Allerg Friday March 17, 2017  8:00 AM     Appointment with [facility] PET Ama; [facility] DOSE RM2 at [facility] PET ([phone])   No strenuous activity 24 hours before your exam.  Have low or no carbohydrates the night before your test, (no candy, gum, mints, i minutes. There are no side effects to the injection. After the Radiologist reads your study, a report will be typed and sent to your Physician within 3-5 business days. Your ordering Physician will discuss the results with you.     [address] [place] [address] MCH 28.1  27.0-33.2  pg Final    MCHC 32.3  31.0-37.0  g/dL Final    RDW 14.9  11.5-16.0  % Final    RDW-SD 47.9 (H) 35.1-46.3  fL Final    Neutrophil Absolute Prelim 5.38  1.30-6.70  x10 (3) uL Final    Neutrophil Absolute 5.38  1.30-6.70  x10(3) uL Giovanna

## (undated) NOTE — MR AVS SNAPSHOT
After Visit Summary   4/26/2017    Caroline Vilma    MRN: [id number]           Diagnoses this Visit     Cancer associated pain    -  Primary     Acute pain of both shoulders           Allergies     No Known Allergies      Your Vital Signs Were Return in about 1 week (around 5/3/2017) for md appt with labs and chemo.       To Do List     Wednesday April 26, 2017     Imaging:  XR SHOULDER, COMPLETE (MIN 2 VIEWS), LEFT (CPT=73030)        Wednesday May 03, 2017 10:00 AM     Appointment with Barry Hanna

## (undated) NOTE — MR AVS SNAPSHOT
After Visit Summary   5/10/2017    Ivania Mensah    MRN: [id number]           Diagnoses this Visit     Multiple myeloma, remission status unspecified ([facility]    -  Primary       Allergies     No Known Allergies      Your Vital Signs Were     Smoking [facility] [address]            Result Summary for IMMUNOGLOBULIN A/G/M, QUANT      Component Results     Component Value Flag Ref Range Units Status    Immunoglobulin A <7.83 (L) 70..00  mg/dL Final    Immunoglobulin G 214 (L) 791-1643  mg/dL Final Component Results     Component Value Flag Ref Range Units Status    WBC 7.1  4.0-13.0  x10(3) uL Final    RBC 3.82  3.80-5.80  x10(6)uL Final    HGB 9.9 (L) 13.0-17.0  g/dL Final    HCT 31.3 (L) 37.0-53.0  % Final    .0  150.0-450.0  10(3)uL Final

## (undated) NOTE — MR AVS SNAPSHOT
After Visit Summary   4/10/2017    Johanna Florez    MRN: [id number]           Diagnoses this Visit     Multiple myeloma not having achieved remission [facility])    -  Primary     Encounter for education           Allergies     No Known Allergies      Y Appointment with Rachel W Berenice Donato at [facility] ([phone])   [address] [facility] [address]       Wednesday April 19, 2017 10:30 AM     Appointment with Gabby Blanca; OLVIN Pérez at [address] [place] [address]

## (undated) NOTE — MR AVS SNAPSHOT
After Visit Summary   2/9/2017    Lizzy Bob    MRN: [id number]           Diagnoses this Visit     Fatigue due to treatment    -  Primary     Neoplasm related pain         Insomnia due to medical condition           Allergies     No Known Aller

## (undated) NOTE — MR AVS SNAPSHOT
After Visit Summary   5/31/2017    Sharif Myers    MRN: [id number]           Diagnoses this Visit     Multiple myeloma in relapse [facility])    -  Primary       Allergies     No Known Allergies      Your Vital Signs Were     Smoking Status You can access your MyChart to more actively manage your health care and view more details from this visit by going to [email or web address].   If you've recently had a stay at the Hospital you can access your discharge instructions in [address] by ingrid

## (undated) NOTE — LETTER
[facility]  Eloisa Vance 59 [address]    Consent for Operation    Date: __________________    Time: _______________    1.  I authorize the performance upon Ralph Pendleton the following operation:    Procedure(s):  ESOPHAGOGASTRODUODEN procedure has been videotaped, the surgeon will obtain the original videotape. The hospital will not be responsible for storage or maintenance of this tape.     6. For the purpose of advancing medical education, I consent to the admittance of observers to t STATEMENTS REQUIRING INSERTION OR COMPLETION WERE FILLED IN.     Signature of Patient:   ___________________________    When the patient is a minor or mentally incompetent to give consent:  Signature of person authorized to consent for patient: ____________ supplements, and pills I can buy without a prescription (including street drugs/illegal medications). Failure to inform my anesthesiologist about these medicines may increase my risk of anesthetic complications.   · If I am allergic to anything or have had Anesthesiologist Signature     Date   Time  I have discussed the procedure and information above with the patient (or patient’s representative) and answered their questions. The patient or their representative has agreed to have anesthesia services.     ___

## (undated) NOTE — MR AVS SNAPSHOT
After Visit Summary   4/12/2017    Ramu Dumont    MRN: [id number]           Diagnoses this Visit     Multiple myeloma, remission status unspecified ([facility]    -  Primary     Multiple myeloma in relapse ([facility]           Allergies     No Known Allerg GFR 94  >=60   Final    Comment:       Estimated GFR units: mL/min/1.73 square meters   eGFR calculated by the CKD-EPI equation.         Calcium, Total 9.2  8.3-10.3  mg/dL Final    Comment:       Total Calciums are not corrected for effects of low albumin Lymphocyte Absolute 0.73 (L) 0.90-4.00  x10(3) uL Final    Monocyte Absolute 0.73 (H) 0.10-0.60  x10(3) uL Final    Eosinophil Absolute 0.15  0.00-0.30  x10(3) uL Final    Basophil Absolute 0.06  0.00-0.10  x10(3) uL Final    Immature Granulocyte Absolute

## (undated) NOTE — MR AVS SNAPSHOT
After Visit Summary   5/3/2017    Fabiola Yanes    MRN: [id number]           Allergies     No Known Allergies      Your Vital Signs Were     Smoking Status                   Current Every Day Smoker           Your Current Medications        Dosage Monday May 08, 2017 9:30 AM     Appointment with Hugo Nesbitt; RN - DR. Sudhir Lloyd at [facility] in [place] ([phone])   [address] [place]inth[place] [address]       Monday May 15, 2017 9:30 AM     Appointment with Renee Nicholas

## (undated) NOTE — IP AVS SNAPSHOT
[facility] [place] [address] [place], [address] ~ [phone]                Discharge Summary   1/4/2017    Kvng Dodge           Admission Information        Provider Department    1/4/2017 Oskar Bui MD  0sw-A         Michaela Kacie [facility] OR        Take by mouth as needed. PEG 3350 Pack   Last time this was given:  17 g on 1/5/2017  9:27 AM   Commonly known as:  Caryl Gore   Next dose due:  Sunday 1/8 am        Take 17 g by mouth daily.          Sund Discharge Orders     Future Labs/Procedures Expected by Expires    BENCE JONES PROTEIN, URINE  1/6/2017 (Approximate) 1/6/2018    Questions:      Specify if random or 24 hour specimen:  24 hour specimen    Spec comment:        Immunization History as of 1/ 55 (01/05/17)  7 (L)      Metabolic Lab Results  (Last result in the past 90 days)    ALT Bilirubin,Total Total Protein Albumin Sodium Potassium Chloride    (01/05/17)  15 (L) (01/05/17)  0.5 (01/05/17)  4.9 (L) (01/05/17)  2.2 (L) (01/07/17)  137 (01/07/1 Call [phone] for help. [employer] is NOT to be used for urgent needs.   For medical emergencies, dial 911.             _____________________________________________________________________________    Medication Side Effects - Medications to be taken at no bowel movement in 2+ days, unresolved pain           Non-Narcotic Pain Medications     aspirin 81 MG Oral Tab       Use: Treat pain, fever, inflammation   Most common side effects: Stomach upset   What to report to your healthcare team: Stomach upset, u

## (undated) NOTE — MR AVS SNAPSHOT
After Visit Summary   5/31/2017    Serg Bonilla    MRN: [id number]           Diagnoses this Visit     Neoplasm related pain    -  Primary     Drug-induced constipation         Other depression         Sleeping difficulty         Neoplastic malign No metal in your clothes, such as snaps or zippers, and please leave valuables/jewelry at home. Bring a photo ID and your insurance card to register. Please allow 2-3 hours from your appointment time until your exam is done.   After you are registered, yo Summaries. If you've been to the Emergency Department or your doctor's office, you can view your past visit information in [employer] by going to Visits < Visit Summaries. [employer] questions? Call [phone] for help.   [employer] is NOT to be used for urge

## (undated) NOTE — MR AVS SNAPSHOT
After Visit Summary   5/31/2017    Penny Amaro    MRN: [id number]           Diagnoses this Visit     Multiple myeloma, remission status unspecified ([facility]    -  Primary       Allergies     No Known Allergies      Your Vital Signs Were     BP Puls Immunoglobulin G 189 (L) 791-1643  mg/dL Final    Immunoglobulin M 6.5 (L) 43.0-279.0  mg/dL Final         Result Summary for CBC WITH DIFFERENTIAL WITH PLATELET      Narrative     The following orders were created for panel order CBC WITH DIFFERENTIAL WI .0  150.0-450.0  10(3)uL Final    MCV 80.1  80.0-99.0  fL Final    MCH 25.1 (L) 27.0-33.2  pg Final    MCHC 31.4  31.0-37.0  g/dL Final    RDW 15.9  11.5-16.0  % Final    RDW-SD 46.4 (H) 35.1-46.3  fL Final    Neutrophil Absolute Prelim 3.41  1.30-

## (undated) NOTE — MR AVS SNAPSHOT
After Visit Summary   6/7/2017    Arthur Winslow    MRN: [id number]           Diagnoses this Visit     Multiple myeloma not having achieved remission ([facility]    -  Primary     Cough           Allergies     No Known Allergies      Your Vital Signs Wer medication needed before the appointment unless otherwise instructed by your physician. Do not eat any food for 6 hours before your appointment time; plain water is OK.   We want you well hydrated; try to drink at least 20 oz. of plain water (no tea, coffe Appointment with Emanuel Dos Santos at [facility] in [place] ([phone])   [address]  [address] [address] [address] [place] [address]       Wednesday June 14, 2017 11:30 AM     Appointment with Keyana Wade at [address] [place] [address]

## (undated) NOTE — LETTER
[facility]  Eloisa Vance 59 [address]    Consent for Operation    Date: __________________    Time: _______________    1.  I authorize the performance upon Ashli Anderson the following operation:    Procedure(s):  ESOPHAGOGASTRODUODEN revealed by the pictures or by descriptive texts accompanying them. If the procedure has been videotaped, the surgeon will obtain the original videotape. The hospital will not be responsible for storage or maintenance of this tape.     6. For the purpose of THAT MY DOCTOR PROVIDED ME WITH THE ABOVE EXPLANATIONS, THAT ALL BLANKS OR STATEMENTS REQUIRING INSERTION OR COMPLETION WERE FILLED IN.     Signature of Patient:   ___________________________    When the patient is a minor or mentally incompetent to give co · All of the medicines I take (including prescriptions, herbal supplements, and pills I can buy without a prescription (including street drugs/illegal medications).  Failure to inform my anesthesiologist about these medicines may increase my risk of anesthe _____________________________________________________________________________  Anesthesiologist Signature     Date   Time  I have discussed the procedure and information above with the patient (or patient’s representative) and answered their questions.  The

## (undated) NOTE — LETTER
[facility]  Eloisa Vance 59 [address] [facility], [address] [place] [address]    Consent for Operation    Date: __________________    Time: _______________    1.  I authorize the performance upon Sharif Myers the following operation:    Procedure(s):  Posterior cervical d revealed by the pictures or by descriptive texts accompanying them. If the procedure has been videotaped, the surgeon will obtain the original videotape. The hospital will not be responsible for storage or maintenance of this tape.     6. For the purpose of THAT MY DOCTOR PROVIDED ME WITH THE ABOVE EXPLANATIONS, THAT ALL BLANKS OR STATEMENTS REQUIRING INSERTION OR COMPLETION WERE FILLED IN.     Signature of Patient:   ___________________________    When the patient is a minor or mentally incompetent to give co supplements, and pills I can buy without a prescription (including street drugs/illegal medications). Failure to inform my anesthesiologist about these medicines may increase my risk of anesthetic complications.   · If I am allergic to anything or have had Anesthesiologist Signature     Date   Time  I have discussed the procedure and information above with the patient (or patient’s representative) and answered their questions. The patient or their representative has agreed to have anesthesia services.     ___

## (undated) NOTE — MR AVS SNAPSHOT
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)    -  Primary     Difficulty sleeping           Allergies     No Known Allergies      You

## (undated) NOTE — MR AVS SNAPSHOT
After Visit Summary   2/16/2017    Irvin Piedra    MRN: [id number]           Diagnoses this Visit     Multiple myeloma, remission status unspecified ([facility]    -  Primary       Allergies     No Known Allergies      Your Vital Signs Were     BP Puls .0  150.0-450.0  10(3)uL Final    MCV 90.5  80.0-99.0  fL Final    MCH 29.7  27.0-33.2  pg Final    MCHC 32.8  31.0-37.0  g/dL Final    RDW 15.6  11.5-16.0  % Final    RDW-SD 51.8 (H) 35.1-46.3  fL Final    Neutrophil Absolute Prelim 4.27  1.30-6. 7

## (undated) NOTE — MR AVS SNAPSHOT
After Visit Summary   4/5/2017    Candelario Bell    MRN: [id number]           Diagnoses this Visit     Drug-induced constipation    -  Primary     Neoplasm related pain         Other depression         [facility]           Al

## (undated) NOTE — MR AVS SNAPSHOT
After Visit Summary   3/29/2017    Candelario Bell    MRN: [id number]           Diagnoses this Visit     Multiple myeloma not having achieved remission [facility])    -  Primary       Allergies     No Known Allergies      Your Vital Signs Were     Smoking You can access your MyChart to more actively manage your health care and view more details from this visit by going to [email or web address].   If you've recently had a stay at the Hospital you can access your discharge instructions in [address] by ingrid

## (undated) NOTE — MR AVS SNAPSHOT
After Visit Summary   6/1/2017    Moniqeu Cuba    MRN: [id number]           Diagnoses this Visit     Multiple myeloma in relapse [facility])    -  Primary     Dehydration         Diarrhea, unspecified type           Allergies     No Known Allergies [address] [facility] [address] [place] [address]       Wednesday June 07, 2017 1:00 PM     Appointment with [address] [place] [address]; [address] [facility] PET DOSE RM1 at [facility] PET ([phone])   No strenuous activity 24 hours before your exam.  Have low or no carbohydrate the injection. After you have rested the scan will take about 30-60 minutes. There are no side effects to the injection. After the Radiologist reads your study, a report will be typed and sent to your Physician within 3-5 business days.  Your ordering Ph

## (undated) NOTE — MR AVS SNAPSHOT
After Visit Summary   6/21/2017    Mandy Noel    MRN: [id number]           Allergies     No Known Allergies      Your Vital Signs Were     BP Pulse Temp(Src) Resp    162/83 mmHg 73 97.4 °F (36.3 °C) (Tympanic) 18    Height Weight BMI SpO2    1. concerns Monday through Friday 8:00 to 4:30. After hours or weekends for emergent needs [phone]. Please Note     If a lab draw is part of your appointment, please arrive 15 minutes early.       Follow-up Instructions     Return in about 3 weeks

## (undated) NOTE — IP AVS SNAPSHOT
Patient Demographics     Address  [address] [address] Phone  [phone] (Home) *Preferred*  [phone] [facility]) E-mail Address  [email or web address]      Emergency Contact(s)     Name Relation Home Work Mobile    Greta Ronquillo Spouse 3 Take 1 tablet by mouth 2 (two) times daily. Stop taking on:  9/26/2017   Soledad Bates MD         aspirin 81 MG Tabs      Take 81 mg by mouth daily.           Dicyclomine HCl 10 MG Caps  Commonly known as:  BENTYL      Take 1 capsule (10 mg total) by emma Take 2 tablets (40 mEq total) by mouth daily.    Teresa Burk MD         Prochlorperazine Maleate 10 mg tablet  Commonly known as:  COMPAZINE      TAKE 1 TABLET(10 MG) BY MOUTH EVERY 6 HOURS AS NEEDED FOR NAUSEA   BONNIE Cesar 8.6 MG T [id number] Senna (SENOKOT) tab TABS 17.2 mg 09/23/17 0854 Given      [id number] dextrose 5% infusion 09/22/17 1714 New Bag      [id number] dextrose 5% infusion 09/22/17 2033 New Bag      [id number] dextrose 5% infusion 09/23/17 0854 New Bag      [id number] doc Total Calciums are not corrected for effects of low albumin. If needed, use the following correction formula. Corrected Calcium Formula:      ((4.0 - Albumin) x 0.8 + Calcium    Note: Calculation is only valid when Albumin is less than 4.0g/dL.      So Blood Culture FREQ X 2 [[id number]] Collected:  09/16/17 1640    Order Status:  Completed Lab Status:  Final result Updated:  09/21/17 1700    Specimen:  Blood from Blood,peripheral      Blood Culture Result No Growth 5 Days    Blood Culture FREQ X 2 [4348 position for the therapy. Patient denies any fevers, chills, nausea, vomiting, diarrhea, constipation. Patient denies any chest pain, shortness of breath patient does have a cough that he has had for the last 2 days.   Patient denies any dizziness, lighth reports that he drinks alcohol. He reports that he does not use drugs.     Family History:   Family History   Problem Relation Age of Onset   • Cancer Father       of Colon cancer   • Other [OTHER] Sister        Allergies: No Known Allergies    Placido Jain Ascorbic Acid (VITAMIN C) 1000 MG Oral Tab Take 1,000 mg by mouth daily. Disp:  Rfl:    Cholecalciferol (VITAMIN D) 1000 UNITS Oral Tab Take 1,000 Units by mouth daily.  Disp:  Rfl:    Dicyclomine HCl 10 MG Oral Cap Take 1 capsule (10 mg total) by mouth 3 ( dehydration and NSAID use, and possible obstructive uropathy. Ultrasound of the kidneys ordered. Nephrology on consult. NSAIDs stopped. Continue IV fluids and we will recheck kidney function in the morning.   2. Multiple myeloma-patient on therapy right [facility]                       Gastroenterology [address] [facility] Gastroenterology    Monique Cuba Patient Status:  Inpatient    1950 MRN [id number]   [facility] 4NW-A Attending MD Deandre Grimaldo interest in food. He reports that his[MG. 2] most recent[MG.1] colonoscopy took place 5-6 yrs ago (unable to recall findings or where it took place) and has never completed an EGD.[MG.2] Currently, the patient is to receive[MG.1] 1 PRBC with daily IV iron. [ escitalopram (LEXAPRO) tablet 10 mg 10 mg Oral Daily   fentaNYL (DURAGESIC) 75 MCG/HR 1 patch 1 patch Transdermal Q72H   gabapentin (NEURONTIN) cap 300 mg 300 mg Oral BID   lubiprostone (AMITIZA) cap 24 mcg 24 mcg Oral BID with meals   oxyCODONE HCl (OXY-I bleeding or nose bleeding;  + chronic anemia            Dermatologic: The patient reports no recent rashes or chronic skin disorders            Rheumatologic: + chronic arthritis, myalgias, arthralgias            Genitourinary: + admitted with KAMLA CA  7.6*  6.8*  6.9*   NA  144  147*  147*   K  4.7  4.8  4.0   CL  115*  117*  118*   CO2  20.0*  20.0*  22.0     Recent Labs   Lab  09/19/17   0600   RBC  2.45*   HGB  6.9*   HCT  21.5*   MCV  87.8   MCH  28.2   MCHC  32.1   RDW  19.1*   NEPRELIM  5.60 chronic small vessel ischemic disease. Ventricles and sulci are appropriate for the patient's age. No mass effect. Marked periosteal thickening of the paranasal sinuses is noted. Visualized portions of the mastoid air cells are unremarkable.   Visualize TECHNIQUE:  Transabdominal gray scale ultrasound imaging of the bilateral kidneys and bladder was performed.  Routine technique was utilized.       PATIENT STATED HISTORY: (As transcribed by Technologist)  Patient complains of right flank pain and acute ki These are of uncertain significance. Further evaluate with CT scanning may be beneficial.     There is normal pulmonary vascularity. There is a stable central venous power port catheter. An acute process is not identified.    Impression:     CONCLUSION:   I personally saw and examined the patient, agree with the above findings, assessment and plan. The patient denies abdominal pain, melena, or hematochezia. He does report chronic constipation. His abdomen is soft, NT/ND, +BS. He has MATTY and +occult blood.  R Presenting Problem: Multiple myeloma w/ relapse; acute renal failure     History related to current admission: Pt is 69year old male admitted on 9/15/2017 from [facility] with c/o SOB and feeling unstable.        Therapy significant co-m • Depression    • Exposure to radiation     radiation on shoulders & side in the past; supposed to start this week but did not    • Family history of colon cancer 5/11/2015   • GERD (gastroesophageal reflux disease)    • History of acute pancreatitis    • How much help from another person does the patient currently need. ..   -   Moving to and from a bed to a chair (including a wheelchair)?: None   -   Need to walk in hospital room?: A Little   -   Climbing 3-5 steps with a railing?: A Little       AM-PAC Sc pt having more awareness of overall stability to incr safety at home. Pt will cont to benefit from skilled IP PT services in order to maximize function. Rec DC to HHPT and family assist PRN.      DISCHARGE RECOMMENDATIONS  PT Discharge Recommendations: Home PHYSICAL THERAPY TREATMENT NOTE - INPATIENT    Room Number: 431/431-A     Session: 2   Number of Visits to Meet Established Goals: 4    Presenting Problem: Multiple myeloma w/ relapse; acute renal failure    History related to current admission: Pt is 67 Basal Cell Carcinoma Face     • Cancer ([facility] [address]     multiple myeloma   • Cataract     cataract surgery 2 1/2 weeks ago L eye   • Depression    • Exposure to radiation     radiation on shoulders & side in the past; supposed to start this week but did not    • -   Moving from lying on back to sitting on the side of the bed?: None   How much help from another person does the patient currently need. ..   -   Moving to and from a bed to a chair (including a wheelchair)?: None   -   Need to walk in hospital room?: A physically able to perform all mobility exercises w/o fatigue at this time but requires supervision to ensure safe mobility.   Patient continues to demonstrate deficits in strength, balance, endurance, mobility and functional independence, and will benefit Diet Recommendations - Solids: Regular  Diet Recommendations - Liquid: Thin    Further Follow-up:  Follow Up Needed: No  SLP Follow-up Date: 09/19/17    Aspiration Precautions: Upright position; Slow rate;Small bites and sips    Medication Administration Re Current Diet Consistency: Regular; Thin liquids  Prior Level of Function: Independent  Prior Living Situation: Home with support  History of Recent: Pneumonia; Difficulty breathing;Fever  Precautions: None    Reason for Referral: R/O aspiration    Family/Pat Reviewed results and recommendations with patient/family/caregiver. Agreement/Understanding verbalized and all questions answered to their apparent satisfaction. INTERDISCIPLINARY COMMUNICATION  Reviewed results with Radiologist; agreement verbalized. study to be completed to objectively assess the swallow given pt's suspicious CXR and self reports of dysphagia. RN aware.     Braxton Assessment of Swallow Function Score: No abnormality detected    PLAN   Diet Recommendations - Solids: Regular  Diet Recommen came in with SOB from Cancer center   • Tubular adenoma of colon 5/11/2015       Prior Living Situation: Home with support  Diet Prior to Admission: Regular; Thin liquids  Precautions: None    Patient/Family Goals:  To eat and drink safely    SWALLOWING HIS Immunizations     Name Date      Depo-Medrol 40mg Inj 07/19/16     Pneumovax 23 05/11/15     TDAP 06/01/10     TDAP 07/10/07       Future Appointments        Provider Benny Potts    9/27/2017 10:30 AM MD Jose Corcoran [facility] [address]

## (undated) NOTE — MR AVS SNAPSHOT
After Visit Summary   2/23/2017    Kvng Dodge    MRN: [id number]           Diagnoses this Visit     Multiple myeloma, remission status unspecified ([facility]    -  Primary     Multiple myeloma in relapse ([facility]           Allergies     No Known Allerg [address] [place] [address] [address] [address]       Thursday March 02, 2017 9:00 AM     Appointment with Joseph Gonzales; PF Ulice [facility] in [facility] ([phone])   Your appointment is on the [address] [place] [address] in Lymphocyte Absolute 0.87 (L) 0.90-4.00  x10(3) uL Final    Monocyte Absolute 0.57  0.10-0.60  x10(3) uL Final    Eosinophil Absolute 0.17  0.00-0.30  x10(3) uL Final    Basophil Absolute 0.04  0.00-0.10  x10(3) uL Final    Immature Granulocyte Absolute 0.

## (undated) NOTE — MR AVS SNAPSHOT
After Visit Summary   4/12/2017    Sharif Myers    MRN: [id number]           Diagnoses this Visit     Neoplasm related pain    -  Primary     Drug-induced constipation         Difficulty sleeping           Allergies     No Known Allergies      Yo Summaries. If you've been to the Emergency Department or your doctor's office, you can view your past visit information in [employer] by going to Visits < Visit Summaries. [employer] questions? Call [phone] for help.   [employer] is NOT to be used for urge

## (undated) NOTE — MR AVS SNAPSHOT
After Visit Summary   2/16/2017    Hakeem     MRN: [id number]           Diagnoses this Visit     Advanced directives, counseling/discussion    -  Primary     Multiple myeloma in relapse ([facility] [address]           Allergies     No Known Allergies      Y

## (undated) NOTE — MR AVS SNAPSHOT
After Visit Summary   6/1/2017    Sharif Myers    MRN: [id number]           Diagnoses this Visit     Dehydration    -  Primary     Multiple myeloma, remission status unspecified (HCC)         Diarrhea, unspecified type           Allergies     No valuables/jewelry at home. Bring a photo ID and your insurance card to register. Please allow 2-3 hours from your appointment time until your exam is done.   After you are registered, you will have a blood glucose test.  If your blood glucose is within no office, you can view your past visit information in [employer] by going to Visits < Visit Summaries. [employer] questions? Call [phone] for help. [employer] is NOT to be used for urgent needs. For medical emergencies, dial 911.

## (undated) NOTE — MR AVS SNAPSHOT
After Visit Summary   5/17/2017    Chester Carmona    MRN: [id number]           Diagnoses this Visit     Multiple myeloma, remission status unspecified ([facility]    -  Primary       Allergies     No Known Allergies      Your Vital Signs Were     BP Puls Component Value Flag Ref Range Units Status    WBC 6.0  4.0-13.0  x10(3) uL Final    RBC 3.79 (L) 3.80-5.80  x10(6)uL Final    HGB 9.9 (L) 13.0-17.0  g/dL Final    HCT 31.4 (L) 37.0-53.0  % Final    .0  150.0-450.0  10(3)uL Final    MCV 82.8  80.0-

## (undated) NOTE — MR AVS SNAPSHOT
After Visit Summary   2/9/2017    Jolly Ramachandran    MRN: [id number]           Diagnoses this Visit     Multiple myeloma, remission status unspecified ([facility]    -  Primary       Allergies     No Known Allergies      Your Vital Signs Were     BP Pulse Amy Internal:  CBC W/ DIFFERENTIAL        Thursday February 09, 2017     LAB:  CBC WITH DIFFERENTIAL WITH PLATELET        Thursday February 09, 2017     LAB:  COMP METABOLIC PANEL (14)        Thursday February 09, 2017     LAB:  IMMUNOGLOBULIN A/G/M, Corrected Calcium Formula:      ((4.0 - Albumin) x 0.8 + Calcium    Note: Calculation is only valid when Albumin is less than 4.0g/dL.       Alkaline Phosphatase 76    U/L Final    AST 11 (L) 15-41  U/L Final    Alt 13 (L) 17-63  U/L Final    Biliru Summaries. If you've been to the Emergency Department or your doctor's office, you can view your past visit information in [employer] by going to Visits < Visit Summaries. [employer] questions? Call [phone] for help.   [employer] is NOT to be used for urge

## (undated) NOTE — LETTER
[facility]  Eloisa Vance [phone] [facility], [address] [place] [address]    Consent for Operation    Date: __________________    Time: _______________    1.  I authorize the performance upon Jina Carreno the following operation:    Procedure(s):  ESOPHAGOGASTRODUODEN procedure has been videotaped, the surgeon will obtain the original videotape. The hospital will not be responsible for storage or maintenance of this tape.     6. For the purpose of advancing medical education, I consent to the admittance of observers to t STATEMENTS REQUIRING INSERTION OR COMPLETION WERE FILLED IN.     Signature of Patient:   ___________________________    When the patient is a minor or mentally incompetent to give consent:  Signature of person authorized to consent for patient: ____________ supplements, and pills I can buy without a prescription (including street drugs/illegal medications). Failure to inform my anesthesiologist about these medicines may increase my risk of anesthetic complications.   · If I am allergic to anything or have had Anesthesiologist Signature     Date   Time  I have discussed the procedure and information above with the patient (or patient’s representative) and answered their questions. The patient or their representative has agreed to have anesthesia services.     ___

## (undated) NOTE — MR AVS SNAPSHOT
After Visit Summary   2/2/2017    Candelario Bell    MRN: [id number]           Diagnoses this Visit     Multiple myeloma, remission status unspecified ([facility]    -  Primary       Allergies     No Known Allergies      Your Vital Signs Were     BP Pulse Appointment with Delon Bullock; OLVIN Whaley at [address] [place] [address] ([phone])   Your appointment is on the [address] [place] [address] in  the [facility]. The address is [address] [place] [address], [address]  [address]

## (undated) NOTE — MR AVS SNAPSHOT
After Visit Summary   5/17/2017    Fabiola Yanes    MRN: [id number]           Allergies     No Known Allergies      Your Vital Signs Were     Smoking Status                   Current Every Day Smoker           Your Current Medications        Dosag view more details from this visit by going to [email or web address]. If you've recently had a stay at the Hospital you can access your discharge instructions in [employer] by going to Visits < Admission Summaries.  If you've been to the Emergency Depar

## (undated) NOTE — MR AVS SNAPSHOT
After Visit Summary   1/16/2017    Mandy Noel    MRN: [id number]           Diagnoses this Visit     Multiple myeloma in relapse [facility])    -  Primary     Right leg weakness           Allergies     No Known Allergies      Your Vital Signs Were LAB:  CBC WITH DIFFERENTIAL WITH PLATELET        Monday January 16, 2017     LAB:  COMP METABOLIC PANEL (14)        Monday January 16, 2017     Imaging:  MRI SPINE LUMBAR (W+WO) (CPT=72158)        Monday January 16, 2017 1:00 PM     Appointment with [facility] ---------                               -----------         ------                     CBC W/ DIFFERENTIAL[[id number]]          Abnormal            Final result                 Please view results for these tests on the individual orders.          Result Sum Lymphocyte Absolute 0.77 (L) 0.90-4.00  x10(3) uL Final    Monocyte Absolute 0.49  0.10-0.60  x10(3) uL Final    Eosinophil Absolute 0.27  0.00-0.30  x10(3) uL Final    Basophil Absolute 0.14 (H) 0.00-0.10  x10(3) uL Final    Immature Granulocyte Absolute

## (undated) NOTE — LETTER
[facility] [address], [address] [place] [address]    Consent for Anesthesia   1.    Lucina Phil agree to be cared for by an anesthesiologist, who is specially trained to monitor me and give me medicine to put me to sleep or keep me comforta vision, nerves, or muscles and in extremely rare instances death. 5. My doctor has explained to me other choices available to me for my care (alternatives).   6. Pregnant Patients (“epidural”):  I understand that the risks of having an epidural (medicine g

## (undated) NOTE — ED AVS SNAPSHOT
Mandy Noel   MRN: [id number]    Department:  [facility] Emergency Department   Date of Visit:  10/17/2017           Disclosure     Insurance plans vary and the physician(s) referred by the ER may not be covered by your plan.  Please contact yo If you have been prescribed any medication(s), please fill your prescription right away and begin taking the medication(s) as directed    If the emergency physician has read X-rays, these will be re-interpreted by a radiologist.  If there is a significant

## (undated) NOTE — MR AVS SNAPSHOT
After Visit Summary   4/5/2017    Cesar Amador    MRN: [id number]           Diagnoses this Visit     Multiple myeloma, remission status unspecified ([facility]    -  Primary     Multiple myeloma in relapse ([facility]           Allergies     No Known Allergi .0  150.0-450.0  10(3)uL Final    MCV 86.6  80.0-99.0  fL Final    MCH 28.0  27.0-33.2  pg Final    MCHC 32.4  31.0-37.0  g/dL Final    RDW 14.4  11.5-16.0  % Final    RDW-SD 45.5  35.1-46.3  fL Final    Neutrophil Absolute Prelim 5.96  1.30-6.70

## (undated) NOTE — MR AVS SNAPSHOT
After Visit Summary   1/26/2017    Kvng Dodge    MRN: [id number]           Diagnoses this Visit     Multiple myeloma in relapse [facility])    -  Primary     Multiple myeloma, remission status unspecified ([facility]           Allergies     No Known Allerg Component Value Flag Ref Range Units Status    Glucose 95  70-99  mg/dL Final    BUN 15  8-20  mg/dL Final    Creatinine 0.80  0.70-1.30  mg/dL Final    GFR 91  >=60   Final    Comment:       Estimated GFR units: mL/min/1.73 square meters   eGFR calculate Monocyte % 13.4   % Final    Eosinophil % 4.2   % Final    Basophil % 1.3   % Final    Immature Granulocyte % 0.4   % Final               MyChart     Visit MyChart  You can access your MyChart to more actively manage your health care and view more details

## (undated) NOTE — MR AVS SNAPSHOT
After Visit Summary   3/2/2017    Elver Garrett    MRN: [id number]           Diagnoses this Visit     Multiple myeloma, remission status unspecified ([facility]    -  Primary     Multiple myeloma in relapse ([facility]           Allergies     No Known Allergi ---------                               -----------         ------                     CBC W/ DIFFERENTIAL[[id number]]          Abnormal            Final result                 Please view results for these tests on the individual orders.          Result Sum Lymphocyte Absolute 1.07  0.90-4.00  x10(3) uL Final    Monocyte Absolute 0.66 (H) 0.10-0.60  x10(3) uL Final    Eosinophil Absolute 0.20  0.00-0.30  x10(3) uL Final    Basophil Absolute 0.05  0.00-0.10  x10(3) uL Final    Immature Granulocyte Absolute 0.

## (undated) NOTE — ED AVS SNAPSHOT
Minda Harada   MRN: [id number]    Department:  [facility] Emergency Department   Date of Visit:  10/16/2017           Disclosure     Insurance plans vary and the physician(s) referred by the ER may not be covered by your plan.  Please contact yo If you have been prescribed any medication(s), please fill your prescription right away and begin taking the medication(s) as directed    If the emergency physician has read X-rays, these will be re-interpreted by a radiologist.  If there is a significant

## (undated) NOTE — MR AVS SNAPSHOT
After Visit Summary   1/26/2017    Eleni Saldana    MRN: [id number]           Diagnoses this Visit     Neoplasm related pain    -  Primary     Neoplastic malignant related fatigue         Difficulty sleeping           Allergies     No Known Allerg

## (undated) NOTE — MR AVS SNAPSHOT
After Visit Summary   2/9/2017    Jina Carreno    MRN: [id number]           Diagnoses this Visit     Multiple myeloma, remission status unspecified ([facility]    -  Primary     Multiple myeloma in relapse ([facility]           Allergies     No Known Allergi

## (undated) NOTE — MR AVS SNAPSHOT
After Visit Summary   4/27/2017    Petar Sunshine    MRN: [id number]           Diagnoses this Visit     Neoplasm related pain    -  Primary     Drug-induced constipation           Allergies     No Known Allergies      Your Vital Signs Were     Smok Summaries. If you've been to the Emergency Department or your doctor's office, you can view your past visit information in [email or web address] by going to Visits < Visit Summaries. [email or web address] questions? Call [phone] for help.   [email or web address] is NOT to be used for urge

## (undated) NOTE — IP AVS SNAPSHOT
[facility] [place] [address] [place], [address] ~ [phone]                Discharge Summary   1/25/2017    Kvng Dodge           Admission Information        Provider Department    1/25/2017 Katheryn Marion MD  Jraed Fitzgerald MILK OF MAGNESIA OR        Take by mouth as needed. [    ]    [    ]    [    ]    [    ]       PEG 3350 Pack   Commonly known as:  MIRALAX        Take 17 g by mouth daily.       [    ]    [    ]    [    ]    [    ]       Senna 8.6 MG Tabs   Commonly k Jan 26, 2017  9:30 AM   CHEMO INFUSION CHEMOTHERAPY with [facility])    [address] [facility] [address]   [phone]            Jan 26, 2017  9:30 AM 0.27 (L) (01/07/17)  0.67 (H) (01/07/17)  0.28 (01/07/17)  1.66      Metabolic Lab Results  (Last result in the past 90 days)    HgbA1C Glucose BUN Creatinine Calcium Alkaline Phosph AST    -- (01/20/17)  90 (01/20/17)  14 (01/20/17)  0.85 (01/20/17)  9.0 Call [phone] for help. [employer] is NOT to be used for urgent needs.   For medical emergencies, dial 911.             _____________________________________________________________________________    Medication Side Effects - Medications to be taken at What to report to your healthcare team: Difficulty urinating, dizziness, no bowel movement in 2+ days, unresolved pain           Non-Narcotic Pain Medications     aspirin 81 MG Oral Tab       Use: Treat pain, fever, inflammation   Most common side effects: Ascorbic Acid (VITAMIN C) 1000 MG Oral Tab    Cholecalciferol (VITAMIN D) 1000 UNITS Oral Tab

## (undated) NOTE — IP AVS SNAPSHOT
[facility] [place] [address] [place], [address] ~ [phone]                Discharge Summary   3/11/2017    Monique Cuba           Admission Information        Provider Department    3/11/2017 [facility], MD  4nw-A Commonly known as:  LEXAPRO        Take 1 tablet (10 mg total) by mouth daily.     Dennys CONLEY                           HYDROmorphone HCl 4 MG Tabs   Commonly known as:  DILAUDID        Take 1 tablet (4 mg total) by mouth every 4 (four) hours as need [address]             Mar 22, 2017  9:00 AM   Follow-Up Visit with Louise Amaro MD   [address] [place] [address] in [facility])    [address] [facility]  [address] 5.45 (03/09/17)  0.78 (L) (03/09/17)  0.41 (03/09/17)  0.16 (03/09/17)  0.04    (03/02/17)  74.1 (03/02/17)  13.8 (03/02/17)  8.5 (03/02/17)  2.6 (03/02/17)  0.6  (03/02/17)  5.72 (03/02/17)  1.07 (03/02/17)  0.66 (H) (03/02/17)  0.20 (03/02/17)  0.05 Summaries. If you've been to the Emergency Department or your doctor's office, you can view your past visit information in [employer] by going to Visits < Visit Summaries. [employer] questions? Call [phone] for help.   [employer] is NOT to be used for urge Use: Treat pain, fever, inflammation   Most common side effects: Stomach upset   What to report to your healthcare team: Stomach upset, unresolved pain           GI Medications     PEG 3350 Oral Powd Pack    Magnesium Hydroxide (MILK OF MAGNESIA OR)

## (undated) NOTE — MR AVS SNAPSHOT
After Visit Summary   5/24/2017    Johanna Florez    MRN: [id number]           Diagnoses this Visit     Multiple myeloma, remission status unspecified ([facility].)    -  Primary     Multiple myeloma in relapse ([facility].)           Allergies     No Known Allerg HCT 30.9 (L) 37.0-53.0  % Final    .0  150.0-450.0  10(3)uL Final    MCV 80.3  80.0-99.0  fL Final    MCH 25.5 (L) 27.0-33.2  pg Final    MCHC 31.7  31.0-37.0  g/dL Final    RDW 15.8  11.5-16.0  % Final    RDW-SD 45.4  35.1-46.3  fL Final    Neutro

## (undated) NOTE — MR AVS SNAPSHOT
After Visit Summary   4/26/2017    Adolph Hernandez    MRN: [id number]           Diagnoses this Visit     Multiple myeloma, remission status unspecified ([facility].)    -  Primary     Multiple myeloma ([facility] 75.)         Multiple myeloma in relapse ([facility].) ---------                               -----------         ------                     CBC W/ DIFFERENTIAL[[id number]]          Abnormal            Final result                 Please view results for these tests on the individual orders.          Result Sum

## (undated) NOTE — IP AVS SNAPSHOT
[facility] [place] [address] [place], [address] ~ [phone]                Discharge Summary   6/12/2017    Ashli Anderson           Admission Information        Provider Department    6/12/2017 Nickie Fernandes MD  3sw-A Take 1 tablet (400 mg total) by mouth 2 (two) times daily. Sirisha CONLEY                              aspirin 81 MG Tabs        Take 81 mg by mouth daily.                             diphenoxylate-atropine 2.5-0.025 MG Tabs   Commonly known as:  LOM These medications were sent to [facility] [address], [address], [phone], [phone]  Radha  [address], [address] [address]     Phone:  [phone]    - Ondansetron HCl 8 MG tablet  - [facility] in [place] MEL[facility])    [address] [place]. [address] [facility]   [phone]              Immunization History as of 6/13/2017  Reviewed on 6/12/2017    Pneumovax 23 5/11/2015    TDAP 6 (06/12/17)  15 (L) (06/12/17)  0.4 (06/12/17)  6.2 (06/12/17)  3.2 (L) (06/13/17)  141 (06/13/17)  4.0 (06/13/17)  111      Radiology Exams     None         Additional Information       We are concerned for your overall well being:    - If you are a smoke provide you with additional printed information. Not all patients will experience these side effects or respond to medications the same. Please call your provider or healthcare team if you have any questions regarding your medications while at home. Use:  Nausea/vomiting, acid reflux, low bowel motility, stomach pain   Most common side effects:  Depends on the specific medication but generally include: diarrhea, constipation, headache, allergic reaction (itching, rash, hives)   What to report to your

## (undated) NOTE — MR AVS SNAPSHOT
After Visit Summary   1/20/2017    Tony Tamez    MRN: [id number]           Diagnoses this Visit     Other specified counseling    -  Primary     Multiple myeloma, remission status unspecified           Allergies     No Known Allergies      Your Thursday January 26, 2017 9:30 AM     Appointment with Keyana Wade at [facility] ([phone])   [address] Vic[address] [place] [address] [address] can access your MyChart to more activel

## (undated) NOTE — MR AVS SNAPSHOT
After Visit Summary   5/3/2017    Minda Harada    MRN: [id number]           Diagnoses this Visit     Neoplasm related pain    -  Primary     Drug-induced constipation         Other depression           Allergies     No Known Allergies      Your V MyChart questions? Call [phone] for help. [employer] is NOT to be used for urgent needs. For medical emergencies, dial 911.

## (undated) NOTE — MR AVS SNAPSHOT
After Visit Summary   1/3/2017    Sharif Myers    MRN: [id number]           Diagnoses this Visit     Multiple myeloma in relapse [facility])    -  Primary       Allergies     No Known Allergies      Your Vital Signs Were     BP Temp(Src) Resp    169/61 Procedure                               Abnormality         Status                     ---------                               -----------         ------                     CBC W/ DIFFERENTIAL[[id number]]          Abnormal            Final result MyChart questions? Call [phone] for help. [employer] is NOT to be used for urgent needs. For medical emergencies, dial 911.

## (undated) NOTE — MR AVS SNAPSHOT
After Visit Summary   5/10/2017    Lizzy Bob    MRN: [id number]           Diagnoses this Visit     Neoplasm related pain    -  Primary     Drug-induced constipation           Allergies     No Known Allergies      Your Vital Signs Were     [employer] MyChart questions? Call [phone] for help. [employer] is NOT to be used for urgent needs. For medical emergencies, dial 911.

## (undated) NOTE — LETTER
[facility]  Eloisa Vance 61 0056 [facility], [address] [place] [address]    Consent for Operation    Date: __________________    Time: _______________    1.  I authorize the performance upon Jina Carreno the following operation:    Procedure(s):  ESOPHAGOGASTRODUODEN procedure has been videotaped, the surgeon will obtain the original videotape. The hospital will not be responsible for storage or maintenance of this tape.     6. For the purpose of advancing medical education, I consent to the admittance of observers to t STATEMENTS REQUIRING INSERTION OR COMPLETION WERE FILLED IN.     Signature of Patient:   ___________________________    When the patient is a minor or mentally incompetent to give consent:  Signature of person authorized to consent for patient: ____________ supplements, and pills I can buy without a prescription (including street drugs/illegal medications). Failure to inform my anesthesiologist about these medicines may increase my risk of anesthetic complications.   · If I am allergic to anything or have had Anesthesiologist Signature     Date   Time  I have discussed the procedure and information above with the patient (or patient’s representative) and answered their questions. The patient or their representative has agreed to have anesthesia services.     ___

## (undated) NOTE — MR AVS SNAPSHOT
After Visit Summary   1/20/2017    Tony Tamez    MRN: [id number]           Diagnoses this Visit     Multiple myeloma, remission status unspecified    -  Primary       Allergies     No Known Allergies      Your Vital Signs Were     BP Pulse Temp Ondansetron (Zofran) 8 mg tablet - take one every 8 hours as needed if prochlorperazine not effective  Can take both throughout the day    Most Common Side Effects:             Chemotherapy: DARZALEX (DARATUMUMAB)-- AND VELCADE    1. [address] · joint pain  · loss of appetite  · tiredness  What may interact with this medicine? Interactions have not been studied. Give your health care provider a list of all the medicines, herbs, non-prescription drugs, or dietary supplements you use.  Also tell treatment response; extra tests may be needed to evaluate response. Do not become pregnant while taking this medicine or for 3 months after stopping it. Women should inform their doctor if they wish to become pregnant or think they might be pregnant.  Ther · constipation  · diarrhea  · loss of appetite  · headache  · irritation at site where injected  · nausea  What may interact with this medicine?   This medicine may interact with the following medications:  · ketoconazole  · rifampin  · ritonavir  · Clarinda Meuse treat yourself. This drug decreases your body's ability to fight infections. Try to avoid being around people who are sick. This medicine may increase your risk to bruise or bleed.  Call your doctor or health care professional if you notice any unusual ble Appointment with Mindi Mcclellan at [facility] in [place] ([phone])   [address]  [address] [facility] [address]       Thursday January 26, 2017 9:30 AM     Appointment with Keyana Wade at [address] [place] [address] ((4.0 - Albumin) x 0.8 + Calcium    Note: Calculation is only valid when Albumin is less than 4.0g/dL.       Alkaline Phosphatase 83    U/L Final    AST 24  15-41  U/L Final    Alt 27  17-63  U/L Final    Bilirubin, Total 0.4  0.1-2.0  mg/dL Final view more details from this visit by going to [email or web address]". [email or web address]. If you've recently had a stay at the Hospital you can access your discharge instructions in [employer] by going to Visits < Admission Summaries.  If you've been to the Emergency Depar

## (undated) NOTE — MR AVS SNAPSHOT
After Visit Summary   5/3/2017    Carlin Cervantes    MRN: [id number]           Diagnoses this Visit     Multiple myeloma, remission status unspecified ([facility] 75.)    -  Primary       Allergies     No Known Allergies      Your Vital Signs Were     BP Pulse Procedure                               Abnormality         Status                     ---------                               -----------         ------                     CBC W/ DIFFERENTIAL[[id number]]          Abnormal            Final result office, you can view your past visit information in [employer] by going to Visits < Visit Summaries. [employer] questions? Call [phone] for help. [employer] is NOT to be used for urgent needs. For medical emergencies, dial 911.

## (undated) NOTE — Clinical Note
Printed: 2017    Patient Name: Cj Ram  : 1950   Medical Record #: [id number]    Consent to Chemotherapy    I, Cj Ram, understand that I have been diagnosed with Multiple Myeloma.     I understand that the treatment suggested Patient Signature                                                            Date      For patients requiring translation or verbal reading of this document, the person reading/translating should document and sign below:    [place]/ Signature

## (undated) NOTE — MR AVS SNAPSHOT
After Visit Summary   6/7/2017    Sharif Myers    MRN: [id number]           Diagnoses this Visit     Multiple myeloma, remission status unspecified ([facility].)    -  Primary       Allergies     No Known Allergies      Your Vital Signs Were     BP Pulse take your diabetic medication at this time. Dress warm and comfortably. No metal in your clothes, such as snaps or zippers, and please leave valuables/jewelry at home. Bring a photo ID and your insurance card to register.   Please allow 2-3 hours from yo The address is [address] [place] [address], [facility], [address], [place]. Please register at the [address] [facility] on the second floor.    [address] [facility] [address] [address]            Result Summary for CBC WITH DIFFERENTIAL WITH PLATELET      N Result Summary for RBC MORPHOLOGY SCAN      Component Results     Component Value Flag Ref Range Units Status    RBC Morphology See morphology below (A) Normal   Final    Platelet Morphology Normal  Normal   Final    Microcytosis Small (A) (none)   Final

## (undated) NOTE — MR AVS SNAPSHOT
After Visit Summary   1/20/2017    Adolph Hernandez    MRN: [id number]           Diagnoses this Visit     Neoplasm related pain    -  Primary     Depression, unspecified depression type           Allergies     No Known Allergies      Your Vital Signs

## (undated) NOTE — MR AVS SNAPSHOT
After Visit Summary   3/9/2017    Meghann Hassan    MRN: [id number]           Diagnoses this Visit     Multiple myeloma, remission status unspecified ([facility] 75.)    -  Primary       Allergies     No Known Allergies      Your Vital Signs Were     Smoking [address] [address]       Wednesday March 22, 2017 9:00 AM     Appointment with Charles Glover at [facility] in [place] ([phone])   [address] [facility] [place] [address]       Wednesday March 22, 2017 9:30 AM     Solomon Lymphocyte Absolute 0.78 (L) 0.90-4.00  x10(3) uL Final    Monocyte Absolute 0.41  0.10-0.60  x10(3) uL Final    Eosinophil Absolute 0.16  0.00-0.30  x10(3) uL Final    Basophil Absolute 0.04  0.00-0.10  x10(3) uL Final    Immature Granulocyte Absolute 0.

## (undated) NOTE — MR AVS SNAPSHOT
After Visit Summary   2/2/2017    Carlin Cervantes    MRN: [id number]           Diagnoses this Visit     Multiple myeloma, remission status unspecified ([facility].)    -  Primary     Multiple myeloma in relapse ([facility].)         Multiple myeloma ([facility].) YOU ALSO RECEIVED ZOMETA INFUSION TODAY FOR YOUR BONES. MAKE SURE AND DRINK PLENTY OF WATER SINCE ZOMETA CAN BE HARSH ON YOUR KIDNEYS.     PT GIVEN NEXT APPT FOR 1 WEEK       Outcome:  Shows understanding   Comments:           Please Note     If a lab draw RBC 3.55 (L) 3.80-5.80  x10(6)uL Final    HGB 10.5 (L) 13.0-17.0  g/dL Final    HCT 32.9 (L) 37.0-53.0  % Final    .0  150.0-450.0  10(3)uL Final    MCV 92.7  80.0-99.0  fL Final    MCH 29.6  27.0-33.2  pg Final    MCHC 31.9  31.0-37.0  g/dL Final

## (undated) NOTE — MR AVS SNAPSHOT
After Visit Summary   3/29/2017    Adolph Hernandez    MRN: [id number]           Diagnoses this Visit     Malignant bone pain    -  Primary     Drug-induced constipation         Neoplastic malignant related fatigue           Allergies     No Known Al

## (undated) NOTE — MR AVS SNAPSHOT
After Visit Summary   6/19/2017    Colten Mandujano    MRN: [id number]           Diagnoses this Visit     Fall against object    -  Primary     Neoplasm related pain         Neoplastic malignant related fatigue         Drug-induced constipation

## (undated) NOTE — ED AVS SNAPSHOT
[facility] Emergency Department    [place]  [address] [address]    Phone:  [phone]    Fax:  Jorge Luis [phone]   MRN: [id number]    Department:  [facility] Emergency Department   Date of Visit:  3/26 Si usted tiene algun problema con carlisle sequimiento, por favor llame a nuestro adminstrador de hope al [phone]    Expect to receive an electronic request (by e-mail or text) to complete a self-assessment the day after your visit.   You may also receiv Wendy Howell [address] ([facility]) Trav 7 Eric Payne. (900 [facility]) [address]  ([address]) [address] [facility] seventh rib lesion with pathologic fracture. Fracture also seen involving the right sixth rib appearing recent, may be related to myeloma. Additional healing right rib fractures.  Disease also involves the left scapula and no acute pulmonary embolism   see Bone abnormalities are present, consistent with the patient's history of multiple myeloma. The left scapula now has mixed lytic and sclerotic appearance with expansion involving the mid and inferior aspect of the left scapula.  Expansile lesion also   prese

## (undated) NOTE — MR AVS SNAPSHOT
After Visit Summary   4/5/2017    Ramu Dumont    MRN: [id number]           Allergies     No Known Allergies      Your Vital Signs Were     Smoking Status                   Current Every Day Smoker           Your Current Medications        Dosage Return in about 1 week (around 4/12/2017) for md appt with labs and chemo.       To Do List     Wednesday April 12, 2017 9:30 AM     Appointment with Pao Orr at [facility] in [place] ([phone])   [address]

## (undated) NOTE — IP AVS SNAPSHOT
After Visit Summary   5/15/2017    Caroline Esparza    MRN: [id number]           Allergies     No Known Allergies      Your Vital Signs Were     Smoking Status                   Current Every Day Smoker                     Patient Instructions    POS MyChart     Visit [employer]  You can access your MyChart to more actively manage your health care and view more details from this visit by going to [email or web address].   If you've recently had a stay at the Hospital you can access your discharge ins

## (undated) NOTE — MR AVS SNAPSHOT
After Visit Summary   3/22/2017    Sharif Myers    MRN: [id number]           Allergies     No Known Allergies      Your Vital Signs Were     BP Pulse Temp(Src) Resp    115/72 mmHg 101 97.8 °F (36.6 °C) (Tympanic) 18    Height Weight BMI SpO2    1 [email or web address]. If you've recently had a stay at the Hospital you can access your discharge instructions in [employer] by going to Visits < Admission Summaries.  If you've been to the Emergency Department or your doctor's office, you can view yo

## (undated) NOTE — MR AVS SNAPSHOT
After Visit Summary   4/19/2017    Ralph Pendleton    MRN: [id number]           Diagnoses this Visit     Neoplasm related pain    -  Primary     Difficulty sleeping         Drug-induced constipation         Other depression           Allergies     N

## (undated) NOTE — IP AVS SNAPSHOT
[facility] [place] [address] [place], [address] ~ [phone]                Discharge Summary   5/26/2017    Lizzy Bob           Admission Information        Provider Department    5/26/2017 Vidya Chadwick MD  4nw-A         T Take by mouth as needed. [    ]    [    ]    [    ]    [    ]       morphINE Sulfate IR 15 MG Tabs   Commonly known as:  MSIR        Take 1 tablet (15 mg total) by mouth every 3 (three) hours as needed for Pain.     Aram Corral     [    ]    [    ] [facility] in [place] MELARA [facility])    [address] [facility] [address]. [address] [place] [address]   [phone]            May 31, 2017 10:00 AM   CHEMO INFUSION CHEMOTHERAPY with [facility] FINXI 0.45 (05/17/17)  0.59 (H) (05/17/17)  2.61      Metabolic Lab Results  (Last result in the past 90 days)    HgbA1C Glucose BUN Creatinine Calcium Alkaline Phosph AST    -- (05/26/17)  139 (H) (05/26/17)  27 (H) (05/26/17)  1.54 (H) (05/26/17)  9.4 (05/26/1 office, you can view your past visit information in [employer] by going to Visits < Visit Summaries. [employer] questions? Call [phone] for help. [employer] is NOT to be used for urgent needs.   For medical emergencies, dial 911.             _____________ Most common side effects: Constipation, drowsiness, dizziness, urinary retention (inability to urinate)   What to report to your healthcare team: Difficulty urinating, dizziness, no bowel movement in 2+ days, unresolved pain           Non-Narcotic Pain Med

## (undated) NOTE — MR AVS SNAPSHOT
After Visit Summary   1/26/2017    Sharif Myers    MRN: [id number]           Diagnoses this Visit     Multiple myeloma in relapse [facility])    -  Primary       Allergies     No Known Allergies      Your Vital Signs Were     BP Pulse Temp(Src) Resp

## (undated) NOTE — MR AVS SNAPSHOT
After Visit Summary   4/19/2017    Howard Perry    MRN: [id number]           Diagnoses this Visit     Multiple myeloma, remission status unspecified ([facility] 75.)    -  Primary     Multiple myeloma in relapse ([facility] 75.)           Allergies     No Known Allerg Wednesday April 19, 2017     LAB:  OAVLWZJOIRLIAT A/G/M, QUANT        Wednesday April 19, 2017     LAB:  MONOCLONAL PROTEIN STUDY        Wednesday April 26, 2017 9:30 AM     Appointment with Lottie Dodson at [facility] in [place] Note: Calculation is only valid when Albumin is less than 4.0g/dL.       Alkaline Phosphatase 97    U/L Final    AST 13 (L) 15-41  U/L Final    Alt 26  17-63  U/L Final    Bilirubin, Total 0.4  0.1-2.0  mg/dL Final    Total Protein 6.6  6.1-8.3  g/d office, you can view your past visit information in [employer] by going to Visits < Visit Summaries. [employer] questions? Call [phone] for help. [employer] is NOT to be used for urgent needs. For medical emergencies, dial 911.

## (undated) NOTE — MR AVS SNAPSHOT
After Visit Summary   3/29/2017    Ralph Pendleton    MRN: [id number]           Diagnoses this Visit     Multiple myeloma in relapse [facility])    -  Primary     Bone pain         Multiple myeloma not having achieved [facility] ([facility]         Multiple myel

## (undated) NOTE — ED AVS SNAPSHOT
[facility] Emergency Department    [place]  [address] [address]    Phone:  [phone]    Fax:  Jorge Luis 7629   MRN: [id number]    Department:  [facility] Emergency Department   Date of Visit:  3/26 IF THERE IS ANY CHANGE OR WORSENING OF YOUR CONDITION, CALL YOUR PRIMARY CARE PHYSICIAN AT ONCE OR RETURN IMMEDIATELY TO THE EMERGENCY DEPARTMENT.     If you have been prescribed any medication(s), please fill your prescription right away and begin taking t

## (undated) NOTE — IP AVS SNAPSHOT
[facility] [place] [address] [place] [address], [address] ~ [phone]                Discharge Summary   6/14/2017    Lizzy Bob           Admission Information        Provider Department    6/14/2017 Daria Niño MD  3sw-A escitalopram 10 MG Tabs   Last time this was given:  10 mg on 6/18/2017  9:36 AM   Commonly known as:  Chanel Serveronica   Next dose due:  Monday, 6/19/17        Take 1 tablet (10 mg total) by mouth daily.     Rik Trotter Where to Get Your Medications      These medications were sent to [facility] [address], [address], [phone], [phone]  [address], [place] [address]     Phone:  [phone] - p 1.6 (06/14/17)  1.1  (06/14/17)  6.73 (H) (06/14/17)  1.02 (06/14/17)  1.15 (H) (06/14/17)  0.15 (06/14/17)  0.10    (06/12/17)  70.0 (06/12/17)  12.4 (06/12/17)  13.5 (06/12/17)  2.3 (06/12/17)  1.4  (06/12/17)  3.90 (06/12/17)  0.69 (L) (06/12/17)  0.75 Summaries. If you've been to the Emergency Department or your doctor's office, you can view your past visit information in vidIQ by going to Visits < Visit Summaries. vidIQ questions? Call [phone] for help.   vidIQ is NOT to be used for urge Use:  Treat pain   Most common side effects: Constipation, drowsiness, dizziness, urinary retention (inability to urinate)   What to report to your healthcare team: Difficulty urinating, dizziness, no bowel movement in 2+ days, unresolved pain Cholecalciferol (VITAMIN D) 1000 UNITS Oral Tab

## (undated) NOTE — MR AVS SNAPSHOT
After Visit Summary   4/12/2017    Eleni Saldana    MRN: [id number]           Allergies     No Known Allergies      Your Vital Signs Were     BP Pulse Temp(Src) Resp    143/78 mmHg 80 97.8 °F (36.6 °C) (Tympanic) 18    Height Weight BMI SpO2    1. Return in about 2 weeks (around 4/26/2017) for md appt with labs and chemo.       To Do List     Wednesday April 19, 2017 10:30 AM     Appointment with Doris Driscoll; OLVIN Fierro at [facility] in [facility] ([phone])   Your ap

## (undated) NOTE — MR AVS SNAPSHOT
After Visit Summary   5/24/2017    Ralph Pendleton    MRN: [id number]           Diagnoses this Visit     Neoplasm related pain    -  Primary     Drug-induced constipation           Allergies     No Known Allergies      Your Vital Signs Were     Smok